# Patient Record
Sex: MALE | Race: WHITE | Employment: FULL TIME | URBAN - METROPOLITAN AREA
[De-identification: names, ages, dates, MRNs, and addresses within clinical notes are randomized per-mention and may not be internally consistent; named-entity substitution may affect disease eponyms.]

---

## 2017-03-06 ENCOUNTER — HOSPITAL ENCOUNTER (EMERGENCY)
Age: 27
Discharge: LEFT BEFORE DISCHARGE | End: 2017-03-06
Attending: EMERGENCY MEDICINE
Payer: SELF-PAY

## 2017-03-06 VITALS
SYSTOLIC BLOOD PRESSURE: 119 MMHG | BODY MASS INDEX: 22.35 KG/M2 | TEMPERATURE: 102.5 F | HEART RATE: 121 BPM | RESPIRATION RATE: 20 BRPM | HEIGHT: 72 IN | OXYGEN SATURATION: 98 % | WEIGHT: 165 LBS | DIASTOLIC BLOOD PRESSURE: 67 MMHG

## 2017-03-06 DIAGNOSIS — Z53.21 PATIENT LEFT AFTER TRIAGE: Primary | ICD-10-CM

## 2017-03-06 LAB
FLUAV AG NPH QL IA: NEGATIVE
FLUBV AG NOSE QL IA: NEGATIVE

## 2017-03-06 PROCEDURE — 75810000275 HC EMERGENCY DEPT VISIT NO LEVEL OF CARE

## 2017-03-06 PROCEDURE — 87804 INFLUENZA ASSAY W/OPTIC: CPT | Performed by: EMERGENCY MEDICINE

## 2017-03-06 PROCEDURE — 87081 CULTURE SCREEN ONLY: CPT | Performed by: EMERGENCY MEDICINE
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
## 2017-03-08 LAB
B-HEM STREP THROAT QL CULT: NEGATIVE
BACTERIA SPEC CULT: NORMAL
SERVICE CMNT-IMP: NORMAL

## 2019-12-03 ENCOUNTER — HOSPITAL ENCOUNTER (OUTPATIENT)
Dept: LAB | Age: 29
Discharge: HOME | End: 2019-12-03

## 2019-12-03 ENCOUNTER — OFFICE VISIT (OUTPATIENT)
Dept: FAMILY MEDICINE CLINIC | Age: 29
End: 2019-12-03

## 2019-12-03 VITALS
HEIGHT: 72 IN | DIASTOLIC BLOOD PRESSURE: 69 MMHG | WEIGHT: 160.2 LBS | HEART RATE: 103 BPM | OXYGEN SATURATION: 98 % | RESPIRATION RATE: 12 BRPM | SYSTOLIC BLOOD PRESSURE: 122 MMHG | TEMPERATURE: 97.9 F | BODY MASS INDEX: 21.7 KG/M2

## 2019-12-03 DIAGNOSIS — Z76.89 ENCOUNTER TO ESTABLISH CARE: ICD-10-CM

## 2019-12-03 DIAGNOSIS — Z76.89 ENCOUNTER TO ESTABLISH CARE: Primary | ICD-10-CM

## 2019-12-03 DIAGNOSIS — F19.11 HISTORY OF INTRAVENOUS DRUG ABUSE (HCC): ICD-10-CM

## 2019-12-03 DIAGNOSIS — Z87.09 HISTORY OF ASTHMA: ICD-10-CM

## 2019-12-03 DIAGNOSIS — B18.2 HEP C W/O COMA, CHRONIC (HCC): ICD-10-CM

## 2019-12-03 DIAGNOSIS — Z72.0 TOBACCO ABUSE DISORDER: ICD-10-CM

## 2019-12-03 DIAGNOSIS — R12 HEARTBURN: ICD-10-CM

## 2019-12-03 LAB
ALBUMIN SERPL-MCNC: 4.1 G/DL (ref 3.4–5)
ALBUMIN/GLOB SERPL: 1.2 {RATIO} (ref 0.8–1.7)
ALP SERPL-CCNC: 75 U/L (ref 45–117)
ALT SERPL-CCNC: 763 U/L (ref 16–61)
ANION GAP SERPL CALC-SCNC: 3 MMOL/L (ref 3–18)
AST SERPL-CCNC: 258 U/L (ref 10–38)
BASOPHILS # BLD: 0 K/UL (ref 0–0.1)
BASOPHILS NFR BLD: 0 % (ref 0–2)
BILIRUB SERPL-MCNC: 0.6 MG/DL (ref 0.2–1)
BUN SERPL-MCNC: 13 MG/DL (ref 7–18)
BUN/CREAT SERPL: 16 (ref 12–20)
CALCIUM SERPL-MCNC: 8.8 MG/DL (ref 8.5–10.1)
CHLORIDE SERPL-SCNC: 109 MMOL/L (ref 100–111)
CHOLEST SERPL-MCNC: 133 MG/DL
CO2 SERPL-SCNC: 28 MMOL/L (ref 21–32)
CREAT SERPL-MCNC: 0.82 MG/DL (ref 0.6–1.3)
DIFFERENTIAL METHOD BLD: ABNORMAL
EOSINOPHIL # BLD: 0.3 K/UL (ref 0–0.4)
EOSINOPHIL NFR BLD: 5 % (ref 0–5)
ERYTHROCYTE [DISTWIDTH] IN BLOOD BY AUTOMATED COUNT: 13.6 % (ref 11.6–14.5)
EST. AVERAGE GLUCOSE BLD GHB EST-MCNC: 97 MG/DL
ETHANOL SERPL-MCNC: <3 MG/DL (ref 0–3)
GLOBULIN SER CALC-MCNC: 3.5 G/DL (ref 2–4)
GLUCOSE SERPL-MCNC: 108 MG/DL (ref 74–99)
HBA1C MFR BLD: 5 % (ref 4.2–5.6)
HCT VFR BLD AUTO: 45.8 % (ref 36–48)
HDLC SERPL-MCNC: 38 MG/DL (ref 40–60)
HDLC SERPL: 3.5 {RATIO} (ref 0–5)
HGB BLD-MCNC: 15.9 G/DL (ref 13–16)
LDLC SERPL CALC-MCNC: 78 MG/DL (ref 0–100)
LIPID PROFILE,FLP: ABNORMAL
LITHIUM SERPL-SCNC: <0.2 MMOL/L (ref 0.6–1.2)
LYMPHOCYTES # BLD: 2 K/UL (ref 0.9–3.6)
LYMPHOCYTES NFR BLD: 32 % (ref 21–52)
MCH RBC QN AUTO: 32.5 PG (ref 24–34)
MCHC RBC AUTO-ENTMCNC: 34.7 G/DL (ref 31–37)
MCV RBC AUTO: 93.7 FL (ref 74–97)
MONOCYTES # BLD: 0.7 K/UL (ref 0.05–1.2)
MONOCYTES NFR BLD: 11 % (ref 3–10)
NEUTS SEG # BLD: 3.3 K/UL (ref 1.8–8)
NEUTS SEG NFR BLD: 52 % (ref 40–73)
PLATELET # BLD AUTO: 219 K/UL (ref 135–420)
PMV BLD AUTO: 10 FL (ref 9.2–11.8)
POTASSIUM SERPL-SCNC: 4.3 MMOL/L (ref 3.5–5.5)
PROT SERPL-MCNC: 7.6 G/DL (ref 6.4–8.2)
RBC # BLD AUTO: 4.89 M/UL (ref 4.7–5.5)
SODIUM SERPL-SCNC: 140 MMOL/L (ref 136–145)
TRIGL SERPL-MCNC: 85 MG/DL (ref ?–150)
TSH SERPL DL<=0.05 MIU/L-ACNC: 0.35 UIU/ML (ref 0.36–3.74)
VLDLC SERPL CALC-MCNC: 17 MG/DL
WBC # BLD AUTO: 6.3 K/UL (ref 4.6–13.2)

## 2019-12-03 PROCEDURE — 84443 ASSAY THYROID STIM HORMONE: CPT

## 2019-12-03 PROCEDURE — 80178 ASSAY OF LITHIUM: CPT

## 2019-12-03 PROCEDURE — 82542 COL CHROMOTOGRAPHY QUAL/QUAN: CPT

## 2019-12-03 PROCEDURE — 80061 LIPID PANEL: CPT

## 2019-12-03 PROCEDURE — 87491 CHLMYD TRACH DNA AMP PROBE: CPT

## 2019-12-03 PROCEDURE — 85025 COMPLETE CBC W/AUTO DIFF WBC: CPT

## 2019-12-03 PROCEDURE — 87389 HIV-1 AG W/HIV-1&-2 AB AG IA: CPT

## 2019-12-03 PROCEDURE — 87661 TRICHOMONAS VAGINALIS AMPLIF: CPT

## 2019-12-03 PROCEDURE — 86780 TREPONEMA PALLIDUM: CPT

## 2019-12-03 PROCEDURE — 87902 NFCT AGT GNTYP ALYS HEP C: CPT

## 2019-12-03 PROCEDURE — 83036 HEMOGLOBIN GLYCOSYLATED A1C: CPT

## 2019-12-03 PROCEDURE — 80053 COMPREHEN METABOLIC PANEL: CPT

## 2019-12-03 PROCEDURE — 87522 HEPATITIS C REVRS TRNSCRPJ: CPT

## 2019-12-03 PROCEDURE — 80307 DRUG TEST PRSMV CHEM ANLYZR: CPT

## 2019-12-03 PROCEDURE — 80074 ACUTE HEPATITIS PANEL: CPT

## 2019-12-03 RX ORDER — IBUPROFEN 200 MG
TABLET ORAL AS NEEDED
COMMUNITY
End: 2020-04-01 | Stop reason: ALTCHOICE

## 2019-12-03 RX ORDER — CALCIUM CARBONATE 200(500)MG
1 TABLET,CHEWABLE ORAL
COMMUNITY
End: 2020-04-01 | Stop reason: ALTCHOICE

## 2019-12-03 RX ORDER — DEXLANSOPRAZOLE 30 MG/1
30 CAPSULE, DELAYED RELEASE ORAL
Qty: 90 CAP | Refills: 0 | Status: SHIPPED | COMMUNITY
Start: 2019-12-03 | End: 2020-04-01 | Stop reason: ALTCHOICE

## 2019-12-03 RX ORDER — DEXLANSOPRAZOLE 30 MG/1
30 CAPSULE, DELAYED RELEASE ORAL
Qty: 90 CAP | Refills: 3 | Status: SHIPPED | COMMUNITY
Start: 2019-12-03 | End: 2019-12-17 | Stop reason: SDUPTHER

## 2019-12-03 RX ORDER — ALBUTEROL SULFATE 90 UG/1
2 AEROSOL, METERED RESPIRATORY (INHALATION)
Qty: 1 INHALER | Refills: 0 | Status: SHIPPED | COMMUNITY
Start: 2019-12-03 | End: 2022-07-21

## 2019-12-03 NOTE — PROGRESS NOTES
Chief Complaint   Patient presents with    Establish Care    Hepatitis C    Form Completion      Medical Report needs for DMV in order to get license due to having been on mood stabilizer med    Bipolar     history with lithium treatment    Addiction problem     hx of heroin abuse     1. When and where did you last receive medical care? Yes When: November 5, 2016 for Detox for Heroin at [facility] and tooth abscess out of state    2. When and where did you last have preventive care such as colon screening? Never    3. What is your current living situation (for example, live alone, live in home with immediate family members)? Lives with maternal grandparents    3. Do you have any problems with communication such trouble seeing, hearing, or understanding instructions? No    5. Do you have an advance directive? This is a document that you can give to family members with instructions for how you would want them to make health care decisions for you if you were unable to speak for yourself. (For example, unconscious, delerious)No    PMH/FH/Social Hx reviewed and updated as needed     Applicable screenings reviewed and updated as needed  Medication reconciliation performed. Patient does not need medication refills. Health Maintenance reviewed.

## 2019-12-03 NOTE — LETTER
12/3/2019 2:04 PM 
 
Mr. Yee [address] [id number] To whom it may concern, Malina Savage. is a patient of the [facility] and they are in need of the Hepatitis A/B immunizations to continue their care. As a patient at our office they have been screened and have limited income. Please consider a reduction in cost for these vaccinations. Without these vaccinations they can not obtain the necessary treatment that will improve their quality of life. If you have any questions please contact me at your convenience.    
 
 
 
 
 
 
Sincerely, 
 
 
Keren Hernandez NP

## 2019-12-04 ENCOUNTER — OFFICE VISIT (OUTPATIENT)
Dept: FAMILY MEDICINE CLINIC | Age: 29
End: 2019-12-04

## 2019-12-04 DIAGNOSIS — F51.11 PRIMARY HYPERSOMNIA: Primary | ICD-10-CM

## 2019-12-04 LAB
C TRACH RRNA SPEC QL NAA+PROBE: NEGATIVE
HAV IGM SER QL: NEGATIVE
HBV CORE IGM SER QL: NEGATIVE
HBV SURFACE AG SER QL: <0.1 INDEX
HBV SURFACE AG SER QL: NEGATIVE
HCV AB SER IA-ACNC: >11 INDEX
HCV AB SERPL QL IA: POSITIVE
HCV COMMENT,HCGAC: ABNORMAL
HIV 1+2 AB+HIV1 P24 AG SERPL QL IA: NONREACTIVE
HIV12 RESULT COMMENT, HHIVC: NORMAL
N GONORRHOEA RRNA SPEC QL NAA+PROBE: NEGATIVE
SP1: ABNORMAL
SP2: ABNORMAL
SP3: ABNORMAL
SPECIMEN SOURCE: NORMAL
T PALLIDUM AB SER QL IA: NONREACTIVE

## 2019-12-04 RX ORDER — TRAZODONE HYDROCHLORIDE 50 MG/1
50 TABLET ORAL
Qty: 30 TAB | Refills: 3 | Status: ON HOLD | OUTPATIENT
Start: 2019-12-04 | End: 2022-07-13
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
## 2019-12-06 LAB
SPECIMEN SOURCE: NORMAL
T VAGINALIS RRNA VAG QL NAA+PROBE: NEGATIVE

## 2019-12-07 LAB
HCV GENOTYPE: NORMAL
HCV GENTYP SERPL NAA+PROBE: NORMAL
HCV RNA SERPL NAA+PROBE-ACNC: NORMAL IU/ML
HCV RNA SERPL NAA+PROBE-LOG IU: 6.31 LOG10 IU/ML
PLEASE NOTE, 550474: NORMAL
TEST INFORMATION, 550045: NORMAL

## 2019-12-08 LAB
AMPHET UR QL SCN: NEGATIVE
BARBITURATES UR QL SCN: NEGATIVE
BENZODIAZ UR QL: NEGATIVE
CANNABINOIDS UR QL CFM: POSITIVE
CANNABINOIDS UR QL SCN: POSITIVE
COCAINE UR QL SCN: NEGATIVE
HDSCOM,HDSCOM: ABNORMAL
METHADONE UR QL: NEGATIVE
OPIATES UR QL: NEGATIVE
PCP UR QL: NEGATIVE
THC UR CFM-MCNC: >400 NG/ML

## 2019-12-16 ENCOUNTER — OFFICE VISIT (OUTPATIENT)
Dept: FAMILY MEDICINE CLINIC | Age: 29
End: 2019-12-16

## 2019-12-16 DIAGNOSIS — F51.01 PRIMARY INSOMNIA: Primary | ICD-10-CM

## 2019-12-16 NOTE — PROGRESS NOTES
This pleasant, cooperative patient is here today for a F/U visit. Patient is being seen for   To get paperwork completed from [facility] completed. This is to assist him with having his  driving privileges restored. Patient presents with pleasant affect and mood. He also  presents well developed and well nourished. Reports his appetite  and sleep is are good. Denies depression and anxiety at this time. Patient is open and candid regarding where  He is mentally and socially at this time. Patient denies SI/HI, hallucinations both visual  and/or auditory. Patient will return to office in 2 months.

## 2019-12-17 ENCOUNTER — OFFICE VISIT (OUTPATIENT)
Dept: FAMILY MEDICINE CLINIC | Age: 29
End: 2019-12-17

## 2019-12-17 VITALS
DIASTOLIC BLOOD PRESSURE: 76 MMHG | HEIGHT: 71 IN | HEART RATE: 96 BPM | SYSTOLIC BLOOD PRESSURE: 114 MMHG | OXYGEN SATURATION: 99 % | BODY MASS INDEX: 21.78 KG/M2 | RESPIRATION RATE: 20 BRPM | TEMPERATURE: 97.7 F | WEIGHT: 155.6 LBS

## 2019-12-17 DIAGNOSIS — B18.2 HEP C W/O COMA, CHRONIC (HCC): Primary | ICD-10-CM

## 2019-12-17 DIAGNOSIS — Z72.0 TOBACCO ABUSE DISORDER: ICD-10-CM

## 2019-12-17 NOTE — LETTER
12/17/2019 [facility] [address], [address] 1990, is picking up the following medications ordered from the [facility] Program: STOCK:  VENTOLIN HFA #1 Suzy Akhtar Patient's Signature: _____________________________ Today's Date: 12/17/2019

## 2019-12-17 NOTE — PATIENT INSTRUCTIONS
Hepatitis C: Care Instructions  Your Care Instructions    Hepatitis C is an infection of the liver caused by a virus. This virus spreads when blood or body fluids from an infected person enter another person's body. This occurs most often when people share needles that have the virus on them. In the past, people got the virus through blood transfusions and organ transplants. But since 1992, all donated blood and organs have been screened for hepatitis C. So getting the virus this way is now very rare. Less often, hepatitis C can spread through sex and sharing items such as razor blades or toothbrushes. Needles used for tattoos and body piercings can also spread the virus. The virus doesn't always cause symptoms. But you may feel tired. And you may have a headache, sore muscles, nausea, and pain in the upper right belly. Other symptoms include yellowish skin and dark urine. Home treatment can help ease symptoms. And your doctor may prescribe antiviral medicine. Long-term infection can lead to severe liver damage. So make sure to go to your follow-up appointments. Follow-up care is a key part of your treatment and safety. Be sure to make and go to all appointments, and call your doctor if you are having problems. It's also a good idea to know your test results and keep a list of the medicines you take. How can you care for yourself at home? · Be safe with medicines. If your doctor prescribes antiviral medicine, take it exactly as prescribed. Call your doctor if you think you are having a problem with your medicine. · Do not drink alcohol. Alcohol can damage the liver. Tell your doctor if you need help to quit. Counseling, support groups, and sometimes medicines can help you stay sober. · Do not take drugs or herbal medicines. They can make liver problems worse. · Make sure your doctor knows all of the medicines you take. Some medicines, such as acetaminophen (Tylenol), can make liver problems worse.  Do not take any new medicines unless your doctor tells you to. This includes over-the-counter medicines. · Maintain a healthy lifestyle. Get plenty of exercise if you feel up to it. Eat a healthy diet. · Drink plenty of fluids, enough so that your urine is light yellow or clear like water. If you have kidney, heart, or liver disease and have to limit fluids, talk with your doctor before you increase the amount of fluids you drink. · Get the vaccines (if you have not already) to protect yourself from hepatitis A and hepatitis B, influenza, and pneumococcus. · The infection can make you itch. Keep cool and stay out of the sun. Try to wear cotton clothing. Talk to your doctor about using over-the-counter medicines for itching. These include diphenhydramine (Benadryl) and chlorpheniramine (Chlor-Trimeton). Follow the instructions on the label. · If you feel depressed, talk to your doctor about treatment. Many people who have long-term illnesses get depressed. Keep in mind that antiviral medicine can make depression worse. To avoid spreading hepatitis C to others  · Tell the people that you live with or have sex with about your illness as soon as you can. · Don't share needles to inject drugs. Don't share other equipment (such as cotton, spoons, and water) with others. Find out if a needle exchange program is available in your area, and use it. Get into a drug treatment program.  · Practice safer sex. Reduce your number of sex partners if you have more than one. Unless you are in a long-term relationship in which neither partner has sex with anyone else, always use latex condoms when you have sex. · Don't donate blood or blood products, organs, semen, or eggs (ova). · Make sure that all equipment is sterilized if you get a tattoo, have your body pierced, or have acupuncture. · Do not share your personal items. These include razors, toothbrushes, towels, and nail files.   · Tell your doctor, dentist, and anyone else who may come in contact with your blood about your illness. · Prevent others from coming in contact with your blood and other body fluids. Keep any cuts, scrapes, or blisters covered. · Wash your hands--and any object that has come in contact with your blood--thoroughly with water and soap. When should you call for help? Call 911 anytime you think you may need emergency care. For example, call if:    · You have trouble breathing.     · You vomit blood or what looks like coffee grounds.    Call your doctor now or seek immediate medical care if:    · You feel very sleepy or confused.     · You have a fever.     · There is a new or increasing yellow tint to your skin or the whites of your eyes.     · You have new or worse belly pain.     · You have any abnormal bleeding, such as:  ? Nosebleeds. ? Vaginal bleeding that is different (heavier, more frequent, at a different time of the month) than what you are used to.  ? Bloody or black stools, or rectal bleeding. ? Bloody or pink urine.    Watch closely for changes in your health, and be sure to contact your doctor if:    · You have any problems.     · Your belly is getting bigger.     · You are gaining weight. Where can you learn more? Go to http://donna-ban.info/. Enter X783 in the search box to learn more about \"Hepatitis C: Care Instructions. \"  Current as of: June 9, 2019  Content Version: 12.2  © 1602-6595 [employer], [employer]. Care instructions adapted under license by [employer] (which disclaims liability or warranty for this information). If you have questions about a medical condition or this instruction, always ask your healthcare professional. [address] any warranty or liability for your use of this information.

## 2019-12-17 NOTE — PROGRESS NOTES
Subjective:     Julia Boothe is an 34 y.o. male who presents for follow up of chronic Hepatitis C secondary to h/o IV drug use. Since the last visit, Currently not on any treatment. Referred to [facility] for treatment. No updated Vaccinations. .    Patient's current symptoms include None. Patient denies abdominal pain, anorexia, dark urine, diarrhea, fatigue, fever, jaundice, pruritis and weight loss. Objective:        Visit Vitals  /76   Pulse 96   Temp 97.7 °F (36.5 °C)   Resp 20   Ht 5' 11\" (1.803 m)   Wt 155 lb 9.6 oz (70.6 kg)   SpO2 99%   BMI 21.70 kg/m²     Abdominal exam: soft, nontender, nondistended, no masses or organomegaly. Laboratory  Lab Results   Component Value Date/Time    ALT (SGPT) 763 (H) 12/03/2019 01:30 PM    AST (SGOT) 258 (H) 12/03/2019 01:30 PM    Alk. phosphatase 75 12/03/2019 01:30 PM    Bilirubin, total 0.6 12/03/2019 01:30 PM     No results found for: [id number], XHEPCS       Assessment/Plan:     Hepatitis C, genotype 1 A     ICD-10-CM ICD-9-CM    1. Hep C w/o coma, chronic (HCC) B18.2 070.54     Refer to GI. Hep Vaccination Letter given for patient to get Hep A/B Vaccinations. 2. Tobacco abuse disorder Z72.0 305.1      Diagnoses and all orders for this visit:    1. Hep C w/o coma, chronic (HCC)  Comments:  Refer to GI. Hep Vaccination Letter given for patient to get Hep A/B Vaccinations. 2. Tobacco abuse disorder    I have discussed the diagnosis with the patient and the intended plan as seen in the above orders. The patient has received an after-visit summary and questions were answered concerning future plans. I have discussed medication side effects and warnings with the patient as well. Patient agreeable with above plan and verbalizes understanding. Follow-up and Dispositions    · Return in about 3 months (around 3/17/2020), or if symptoms worsen or fail to improve.

## 2019-12-17 NOTE — LETTER
12/17/2019 1:21 PM 
 
Mr. Yee E [facility] [address] [address] [address] To whom it may concern, Prema Pereira. is a patient of the [facility] and they are in need of the Hepatitis A/B immunizations to continue their care. As a patient at our office they have been screened and have limited income. Please consider a reduction in cost for these vaccinations. Without these vaccinations they can not obtain the necessary treatment that will improve their quality of life. If you have any questions please contact me at your convenience.    
 
 
 
 
Sincerely, 
 
 
Pau Dominguez NP

## 2020-01-09 ENCOUNTER — TELEPHONE (OUTPATIENT)
Dept: FAMILY MEDICINE CLINIC | Age: 30
End: 2020-01-09

## 2020-01-09 NOTE — TELEPHONE ENCOUNTER
Medication: Dexilant 30mg  , dose: 1 tab, how often: every day as needed , current number of medication days provided: 90, refill per application. Lot #:    This medication was received and   Lot #[id number], EXP 01/2021  verified for the following 1. Correct Patient, 2. Correct Diagnosis, 3. Correct Drug, 4. Correct route, and no current allergy to medication.

## 2020-02-25 ENCOUNTER — OFFICE VISIT (OUTPATIENT)
Dept: FAMILY MEDICINE CLINIC | Age: 30
End: 2020-02-25

## 2020-02-25 VITALS
DIASTOLIC BLOOD PRESSURE: 68 MMHG | SYSTOLIC BLOOD PRESSURE: 105 MMHG | BODY MASS INDEX: 21.9 KG/M2 | OXYGEN SATURATION: 99 % | TEMPERATURE: 97.4 F | WEIGHT: 156.4 LBS | HEART RATE: 91 BPM | RESPIRATION RATE: 20 BRPM | HEIGHT: 71 IN

## 2020-02-25 DIAGNOSIS — B18.2 HEP C W/O COMA, CHRONIC (HCC): ICD-10-CM

## 2020-02-25 DIAGNOSIS — F17.200 CURRENT SMOKER: ICD-10-CM

## 2020-02-25 DIAGNOSIS — R00.2 INTERMITTENT PALPITATIONS: Primary | ICD-10-CM

## 2020-02-25 DIAGNOSIS — Z23 ENCOUNTER FOR IMMUNIZATION: ICD-10-CM

## 2020-02-25 DIAGNOSIS — I45.10 INCOMPLETE RIGHT BUNDLE BRANCH BLOCK: ICD-10-CM

## 2020-02-25 NOTE — PATIENT INSTRUCTIONS
!!PLEASE READ!! Former[facility] will be closing its doors approximately March 14, 2020. You can seek urgent medical care at any emergency department. For primary care purposes, please apply for the medicaid expansion at the financial office at [facility] [place]. If you don't qualify for medicaid, you can seek primary care at any clinic within [facility]. See below:  1) Awa SULTANA Chang- go onto website to see schedule. This is a mobile unit and goes from city to city. 2) [address] [facility], [place], [id number] [facility] Adithya [address]851, [address] [phone]  4) [facility] [address] [place], [facility] [phone]  5) [address] [phone]    TID BITS:  Disposing of medications in the household trash: Almost all medicines can be thrown into your household trash. These include prescription and over-the-counter (OTC) drugs in pills, liquids, drops, patches, creams, and inhalers. Follow these steps:  1) Remove the drugs from their original containers and mix them with something undesirable, such as used coffee grounds, dirt, or cat litter. This makes the medicine less appealing to children and pets and unrecognizable to someone who might intentionally go through the trash looking for drugs. 2) Put the mixture in something you can close (a re-sealable zipper storage bag, empty can, or other container) to prevent the drug from leaking or spilling out. 3) Throw the container in the garbage. 4) Scratch out all your personal information on the empty medicine packaging to protect your identity and privacy. Throw the packaging away.   If you have a question about your medicine, ask your health care provider or pharmacist.    If you obtain medical insurance and are interested in continuing your care with   Dr. Real Conley [facility], she will be transferring to:    [facility] [address] [place] [address], [facility] [address]  [facility], [facility] Emergency Department)  Phone: [phone]

## 2020-02-25 NOTE — PROGRESS NOTES
[facility]  [id number] [facility] [address] [facility] [address], [address]  [phone] [facility] fax      2/25/2020    Reason for visit:   Chief Complaint   Patient presents with    Follow Up Chronic Condition    Immunization/Injection       Patient: Ankita Fernandez, 1990, [id number]       Primary MD: No primary care provider on file. Subjective:   Ankita Fernandez, a 34 y.o. male, who presents for Follow Up Chronic Condition and Immunization/Injection      Past Medical History:   Diagnosis Date    ADHD (attention deficit hyperactivity disorder)     Asthma     Bipolar disorder ([facility] [address]     Facial injury     Hepatitis C     Heroin abuse ([facility]     treatment in [facility] 2016    Lyme disease        Past Surgical History:   Procedure Laterality Date    HX TYMPANOSTOMY      bilateral TM's as a child       Social History     Socioeconomic History    Marital status: SINGLE     Spouse name: Not on file    Number of children: 0    Years of education: 5    Highest education level: GED or equivalent   Occupational History    Occupation: Prep Cook    Social Needs    Financial resource strain: Not on file    Food insecurity:     Worry: Not on file     Inability: Not on file   [employer] needs:     Medical: Not on file     Non-medical: Not on file   Tobacco Use    Smoking status: Current Every Day Smoker     Packs/day: 1.00     Years: 10.00     Pack years: 10.00    Smokeless tobacco: Former User   Substance and Sexual Activity    Alcohol use: Yes     Alcohol/week: 3.0 standard drinks     Types: 3 Cans of beer per week     Frequency: 2-3 times a week     Drinks per session: 3 or 4     Binge frequency: Less than monthly     Comment: Occasional    Drug use: Yes     Types: Marijuana    Sexual activity: Not Currently     Partners: Female   Lifestyle    Physical activity:     Days per week: 2 days     Minutes per session: 20 min    Stress:  To some extent   Relationships    Social connections:     Talks on phone: Not on file     Gets together: Not on file     Attends [employer] service: Not on file     Active member of club or organization: Not on file     Attends meetings of clubs or organizations: Not on file     Relationship status: Not on file    Intimate partner violence:     Fear of current or ex partner: Not on file     Emotionally abused: Not on file     Physically abused: Not on file     Forced sexual activity: Not on file   Other Topics Concern     Service No    Blood Transfusions No    Caffeine Concern No    Occupational Exposure Yes     Comment: Dust in [employer] Hazards Yes     Comment: riding horses    Sleep Concern Yes     Comment: \"trouble sleeping\"    Stress Concern Yes    Weight Concern No    Special Diet No    Back Care No    Exercise Yes     Comment: runs    Bike Helmet No    Seat Belt Yes    Self-Exams Yes   Social History Narrative    Not on file       Allergies   Allergen Reactions    Tylenol [Acetaminophen] Other (comments)     \" Have liver problems and should not take\"         Current Outpatient Medications on File Prior to Visit   Medication Sig Dispense Refill    ibuprofen (MOTRIN) 200 mg tablet Take  by mouth as needed for Pain.  calcium carbonate (TUMS) 200 mg calcium (500 mg) chew Take 1 Tab by mouth daily as needed.  dexlansoprazole (DEXILANT) 30 mg capsule Take 1 Cap by mouth daily as needed (heartburn). Indications: heartburn 90 Cap 0    albuterol (PROVENTIL HFA, VENTOLIN HFA, PROAIR HFA) 90 mcg/actuation inhaler Take 2 Puffs by inhalation every six (6) hours as needed for Wheezing. 1 Inhaler 0    traZODone (DESYREL) 50 mg tablet Take 1 Tab by mouth nightly for 30 days. Indications: insomnia associated with depression 30 Tab 3     No current facility-administered medications on file prior to visit. Review of Systems   Constitutional: Negative. HENT: Negative. Eyes: Negative.     Respiratory: Negative for cough, shortness of breath and wheezing. Smoke cigarettes   Cardiovascular: Positive for palpitations. Used to take adderall but taken off due to palpitations. Palpitations went away for years, maybe happen occasionally. Started daily about 1 week. Notice palpitations when I am stressed. No SOB or chest pain. Gastrointestinal: Negative for heartburn. Never followed up with GI for my Hep C. No one called me. Take dexilant only as needed   Genitourinary: Negative. Musculoskeletal: Negative. Skin: Negative. Neurological: Negative. Endo/Heme/Allergies: Negative. Psychiatric/Behavioral: Negative for substance abuse. The patient does not have insomnia (taking trazadone only when needed). Objective:   Visit Vitals  /68   Pulse 91   Temp 97.4 °F (36.3 °C)   Resp 20   Ht 5' 11\" (1.803 m)   Wt 156 lb 6.4 oz (70.9 kg)   SpO2 99%   BMI 21.81 kg/m²      Wt Readings from Last 3 Encounters:   02/25/20 156 lb 6.4 oz (70.9 kg)   12/17/19 155 lb 9.6 oz (70.6 kg)   12/03/19 160 lb 3.2 oz (72.7 kg)     Lab Results   Component Value Date/Time    Glucose 108 (H) 12/03/2019 01:30 PM         Physical Exam  Constitutional:       Appearance: Normal appearance. HENT:      Head: Normocephalic and atraumatic. Right Ear: Tympanic membrane, ear canal and external ear normal.      Left Ear: Tympanic membrane, ear canal and external ear normal.      Nose: Nose normal.      Mouth/Throat:      Mouth: Mucous membranes are moist.   Neck:      Musculoskeletal: Normal range of motion and neck supple. Cardiovascular:      Rate and Rhythm: Normal rate and regular rhythm. Heart sounds: Normal heart sounds. Pulmonary:      Effort: Pulmonary effort is normal.      Breath sounds: Normal breath sounds. Musculoskeletal: Normal range of motion. Skin:     General: Skin is warm and dry. Neurological:      Mental Status: He is alert and oriented to person, place, and time. Psychiatric:         Mood and Affect: Mood normal.         Behavior: Behavior normal.         Thought Content: Thought content normal.       Assessment:    Melissa Prasad. who has risk factors including (see above previous medical hx) and:     1. Intermittent palpitations  Pt has a hx of anxiety and insomnia and pt admits he notices the palpitations more when he is stressed. Due to pts past drug hx, will obtain a baseline EKG. Pt is unable to go today but will complete EKG tomorrow. - EKG, 12 LEAD, INITIAL; Future    2. Hep C w/o coma, chronic (HCC)  Pt has a past hx of IVDU x3 years, previous to this he snorted heroin for 1-2 years. Pt decided in Nov 2016 that he didn't want to be hooked on drugs and went to inpatient rehab where he has been clean every since. Instructed pt ie Hep C and negative health effects. Pt was given the number to [facility] to call and schedule appt himself. Did send a message to [facility], to follow up. 3. Current smoker  Counseled patient on the dangers of tobacco use, and was advised to quit. Reviewed strategies to maximize success, including the use of Chantix. Discussed the risks of continued tobacco use such as elevated blood pressure, vascular irritation with increased incidence of CVD with stroke or MI and PVD causing claudication, lung damage that could lead to COPD, cancer and death. Encouraged an approach to find a few healthy habits, write them down then plan to decrease their cigarette use by one each week till they are gone all together and to plan for stress that may cause them to want to restart and how to prevent it by having new coping mechanisms in place. 1-800-QUIT-NOW provided for counseling       4. Encounter for immunization    - INFLUENZA VIRUS VAC QUAD,SPLIT,PRESV FREE SYRINGE IM      Written instructions followed our verbal discussion of all information discussed above, pending tests ordered and future goals/plans.  Patient expressed understanding of current diagnosis, planned testing, follow up and if needed to contact the office for any questions or concerns prior to the next visit. Plan:   Med reconciliation completed with patient. Reviewed side effects of medications with the patient. Questions were answered and patient verb understanding. Discussed lab results with patient    n/a      Orders Placed This Encounter    Influenza virus vaccine (QUADRIVALENT PRES FREE SYRINGE) IM ([id number])    EKG, 12 LEAD, INITIAL     Standing Status:   Future     Standing Expiration Date:   3/5/2020     Order Specific Question:   Reason for Exam:     Answer:   palpitations     Current Outpatient Medications   Medication Sig Dispense Refill    ibuprofen (MOTRIN) 200 mg tablet Take  by mouth as needed for Pain.  calcium carbonate (TUMS) 200 mg calcium (500 mg) chew Take 1 Tab by mouth daily as needed.  dexlansoprazole (DEXILANT) 30 mg capsule Take 1 Cap by mouth daily as needed (heartburn). Indications: heartburn 90 Cap 0    albuterol (PROVENTIL HFA, VENTOLIN HFA, PROAIR HFA) 90 mcg/actuation inhaler Take 2 Puffs by inhalation every six (6) hours as needed for Wheezing. 1 Inhaler 0    traZODone (DESYREL) 50 mg tablet Take 1 Tab by mouth nightly for 30 days. Indications: insomnia associated with depression 30 Tab 3       Follow-up and Dispositions         Pt notified of provider leaving the clinic as of 3/14/2020. Strongly encouraged pt to start seeking a new PCP. In pts dc paperwork, Pt was given some information to help seek out a new provider. Patient verbalized understanding. Encouraged pt to see [employer] for ITA application. Citlali Gonzalez. [employer], [address] [facility]      I spent 25 minutes with the patient in face-to-face consultation, of which greater than 50% was spent in counseling and coordination of care as described above.

## 2020-02-25 NOTE — Clinical Note
Please follow up with patient in reference to seeing GLST. Referral was closed due to no return call. Pt states he didn't get a call. [phone] is his cell number they can reach him at.  Thank you

## 2020-02-25 NOTE — PROGRESS NOTES
Bard Yu is a 34 y.o. male who presents for routine immunizations. He denies any symptoms , reactions or allergies that would exclude them from being immunized today. Risks and adverse reactions were discussed and the VIS was given to them. All questions were addressed. He was observed for 20 min post injection. There were no reactions observed.     Annemarie Villar

## 2020-02-26 ENCOUNTER — HOSPITAL ENCOUNTER (OUTPATIENT)
Dept: LAB | Age: 30
Discharge: HOME | End: 2020-02-26
Payer: SELF-PAY

## 2020-02-26 LAB
ATRIAL RATE: 87 BPM
CALCULATED P AXIS, ECG09: 72 DEGREES
CALCULATED R AXIS, ECG10: 76 DEGREES
CALCULATED T AXIS, ECG11: 47 DEGREES
DIAGNOSIS, 93000: NORMAL
P-R INTERVAL, ECG05: 128 MS
Q-T INTERVAL, ECG07: 356 MS
QRS DURATION, ECG06: 102 MS
QTC CALCULATION (BEZET), ECG08: 428 MS
VENTRICULAR RATE, ECG03: 87 BPM

## 2020-02-26 PROCEDURE — 93005 ELECTROCARDIOGRAM TRACING: CPT

## 2020-02-26 NOTE — PROGRESS NOTES
Re-faxed the closed referral to [facility] requesting appt to be scheduled. Called patient and he informs that he called [facility] yesterday and has been scheduled for appointment on 03/19/2020 at their [facility] office.

## 2020-03-02 NOTE — PROGRESS NOTES
Called patient and informed him of referral to Cardiology at [facility]. (Patient lives in [place]). Reminded patient to see [employer] for benefit screening or [employer]. Referral, note, and EKG e-faxed to Cardiology pending review and scheduling. Patient given Cardio contact number to call in case he does not receive a scheduling call. Patient made aware to bring photo ID, medications, and any payment requested. Patient voiced understanding.

## 2020-04-01 ENCOUNTER — OFFICE VISIT (OUTPATIENT)
Dept: CARDIOLOGY CLINIC | Age: 30
End: 2020-04-01

## 2020-04-01 VITALS
OXYGEN SATURATION: 98 % | BODY MASS INDEX: 21.84 KG/M2 | WEIGHT: 156 LBS | DIASTOLIC BLOOD PRESSURE: 64 MMHG | HEIGHT: 71 IN | HEART RATE: 65 BPM | SYSTOLIC BLOOD PRESSURE: 104 MMHG

## 2020-04-01 DIAGNOSIS — I45.10 RBBB: Primary | ICD-10-CM

## 2020-04-01 DIAGNOSIS — F17.200 TOBACCO USE DISORDER: ICD-10-CM

## 2020-04-01 DIAGNOSIS — R00.2 PALPITATIONS: ICD-10-CM

## 2020-04-01 NOTE — PROGRESS NOTES
Wallace Justen. presents today for No chief complaint on file. Wallaceisidro Albrechtanna. preferred language for health care discussion is english/other. Is someone accompanying this pt? no    Is the patient using any DME equipment during [address]? no    Depression Screening:  3 most recent PHQ Screens 4/1/2020   Little interest or pleasure in doing things Not at all   Feeling down, depressed, irritable, or hopeless Not at all   Total Score PHQ 2 0       Learning Assessment:  Learning Assessment 4/1/2020   PRIMARY LEARNER Patient   PRIMARY LANGUAGE ENGLISH   LEARNER PREFERENCE PRIMARY DEMONSTRATION   ANSWERED BY Patient   RELATIONSHIP SELF       Abuse Screening:  Abuse Screening Questionnaire 4/1/2020   Do you ever feel afraid of your partner? N   Are you in a relationship with someone who physically or mentally threatens you? N   Is it safe for you to go home? Y       Fall Risk  Fall Risk Assessment, last 12 mths 4/1/2020   Able to walk? Yes   Fall in past 12 months? No       Pt currently taking Anticoagulant therapy? no    Coordination of Care:  1. Have you been to the ER, urgent care clinic since your last visit? Hospitalized since your last visit? no    2. Have you seen or consulted any other health care providers outside of the [address] [place] [address] since your last visit? Include any pap smears or colon screening.  no

## 2020-04-01 NOTE — PATIENT INSTRUCTIONS
24 holter  Follow up as needed    If you have not heard from the central scheduler to schedule your testing in 48 hours, please call [phone].

## 2020-04-01 NOTE — PROGRESS NOTES
HISTORY OF PRESENT ILLNESS  Lidia Soria is a 34 y.o. male. HPI    Patient presents for a new office visit. He was referred here by his PCP for evaluation of heart palpitations and incomplete right bundle branch block on his EKG. Patient does not have a prior cardiac history. He does have a history of prior heroin abuse, but has been clean for the past 3 to 4 years. He currently smokes cigarettes approximately three quarters a pack a day and has for the past 10 years. More recently, he has been feeling heart palpitations primarily at night when he is trying to rest or relax. The symptoms will last intermittently for 15 to 20 minutes before resolving but then will occur later again for the same time. He describes a sensation of his heart skipping beats or fluttering or beating irregularly. He states it does not really race. There is no associated dizziness diaphoresis or syncope. He does notice occasional shortness of breath with the symptoms. He has never experienced any exertional chest pain or shortness of breath. Past Medical History:   Diagnosis Date    ADHD (attention deficit hyperactivity disorder)     Asthma     [facility]     Facial injury     Hepatitis C     [facility]in abuse ([facility]     treatment in [facility]ox 2016    Lyme disease      Current Outpatient Medications   Medication Sig Dispense Refill    albuterol (PROVENTIL HFA, VENTOLIN HFA, PROAIR HFA) 90 mcg/actuation inhaler Take 2 Puffs by inhalation every six (6) hours as needed for Wheezing. 1 Inhaler 0    traZODone (DESYREL) 50 mg tablet Take 1 Tab by mouth nightly for 30 days.  Indications: insomnia associated with depression 30 Tab 3     Allergies   Allergen Reactions    Tylenol [Acetaminophen] Other (comments)     \" Have liver problems and should not take\"        Social History     Tobacco Use    Smoking status: Current Every Day Smoker     Packs/day: 1.00     Years: 10.00     Pack years: 10.00    Smokeless tobacco: Former User   Substance Use Topics    Alcohol use: Yes     Alcohol/week: 3.0 standard drinks     Types: 3 Cans of beer per week     Frequency: 2-3 times a week     Drinks per session: 3 or 4     Binge frequency: Less than monthly     Comment: Occasional    Drug use: Yes     Types: Marijuana     Family History   Problem Relation Age of Onset    Emphysema Mother     COPD Mother     No Known Problems Father     Attention Deficit Hyperactivity Disorder Brother     Diabetes Maternal Grandmother     Stroke Maternal Grandmother     Kidney Disease Maternal Grandfather     Liver Disease Paternal Grandfather         Cirrhosis caused death         Review of Systems   Constitutional: Negative for chills, fever and weight loss. HENT: Negative for nosebleeds. Eyes: Negative for blurred vision and double vision. Respiratory: Negative for cough, shortness of breath and wheezing. Cardiovascular: Positive for palpitations. Negative for chest pain, orthopnea, claudication, leg swelling and PND. Gastrointestinal: Negative for abdominal pain, heartburn, nausea and vomiting. Genitourinary: Negative for dysuria and hematuria. Musculoskeletal: Negative for falls and myalgias. Skin: Negative for rash. Neurological: Negative for dizziness, focal weakness and headaches. Endo/Heme/Allergies: Does not bruise/bleed easily. Psychiatric/Behavioral: Negative for substance abuse. Visit Vitals  /64   Pulse 65   Ht 5' 11\" (1.803 m)   Wt 70.8 kg (156 lb)   SpO2 98%   BMI 21.76 kg/m²       Physical Exam   Constitutional: He is oriented to person, place, and time. He appears well-developed and well-nourished. HENT:   Head: Normocephalic and atraumatic. Eyes: Conjunctivae are normal.   Neck: Neck supple. No JVD present. Carotid bruit is not present. Cardiovascular: Normal rate, regular rhythm, S1 normal, S2 normal and normal pulses. Exam reveals no gallop and no S3.    No murmur heard.  Pulmonary/Chest: Breath sounds normal. He has no wheezes. He has no rales. Abdominal: Soft. Bowel sounds are normal. There is no abdominal tenderness. Musculoskeletal:         General: No tenderness, deformity or edema. Neurological: He is alert and oriented to person, place, and time. Skin: Skin is warm and dry. Psychiatric: He has a normal mood and affect. His behavior is normal. Thought content normal.     EKG: Normal sinus rhythm, normal axis, normal QTc interval, no ST or T wave abnormalities concerning for ischemia. Normal tracing. Compared to the previous EKG from last month, incomplete right bundle branch block is no longer present. ASSESSMENT and PLAN    Heart palpitations. My suspicion is that the patient may be having intermittent PVCs or possibly PACs. Since the symptoms are occurring on a nightly basis, I have recommended a  24-hour Holter monitor for further evaluation. If this is unremarkable, no further work-up is necessary. Incomplete right bundle branch block. Appears to be intermittent on EKGs. This is a normal variant and benign finding, no further work-up necessary for this. Tobacco use disorder. Patient has been smoking three quarters a pack to a pack of cigarettes a day for the past 10 years. He was counseled on the importance of smoking cessation. He is willing to consider this. As long as the patient's echocardiogram does not show any significant arrhythmias, he can follow-up in the future as needed.

## 2022-01-04 ENCOUNTER — APPOINTMENT (OUTPATIENT)
Age: 32
End: 2022-01-04

## 2022-01-04 PROBLEM — Z00.00 ENCOUNTER FOR PREVENTIVE HEALTH EXAMINATION: Status: ACTIVE | Noted: 2022-01-04

## 2022-01-07 ENCOUNTER — APPOINTMENT (OUTPATIENT)
Age: 32
End: 2022-01-07

## 2022-03-19 PROBLEM — F17.200 TOBACCO USE DISORDER: Status: ACTIVE | Noted: 2020-04-01

## 2022-03-20 PROBLEM — R00.2 PALPITATIONS: Status: ACTIVE | Noted: 2020-04-01

## 2022-06-22 ENCOUNTER — APPOINTMENT (OUTPATIENT)
Age: 32
End: 2022-06-22
Attending: STUDENT IN AN ORGANIZED HEALTH CARE EDUCATION/TRAINING PROGRAM

## 2022-06-22 ENCOUNTER — HOSPITAL ENCOUNTER (EMERGENCY)
Age: 32
Discharge: HOME | End: 2022-06-22
Attending: STUDENT IN AN ORGANIZED HEALTH CARE EDUCATION/TRAINING PROGRAM

## 2022-06-22 VITALS
RESPIRATION RATE: 16 BRPM | WEIGHT: 155 LBS | DIASTOLIC BLOOD PRESSURE: 83 MMHG | HEIGHT: 71 IN | OXYGEN SATURATION: 100 % | HEART RATE: 93 BPM | TEMPERATURE: 98.4 F | BODY MASS INDEX: 21.7 KG/M2 | SYSTOLIC BLOOD PRESSURE: 113 MMHG

## 2022-06-22 DIAGNOSIS — M54.31 SCIATICA OF RIGHT SIDE: Primary | ICD-10-CM

## 2022-06-22 LAB
AMORPH CRY URNS QL MICRO: ABNORMAL
AMPHET UR QL SCN: POSITIVE
ANION GAP SERPL CALC-SCNC: 5 MMOL/L (ref 3–18)
APPEARANCE UR: ABNORMAL
BACTERIA URNS QL MICRO: NEGATIVE /HPF
BARBITURATES UR QL SCN: NEGATIVE
BASOPHILS # BLD: 0 K/UL (ref 0–0.1)
BASOPHILS NFR BLD: 0 % (ref 0–2)
BENZODIAZ UR QL: NEGATIVE
BILIRUB UR QL: NEGATIVE
BUN SERPL-MCNC: 10 MG/DL (ref 7–18)
BUN/CREAT SERPL: 14 (ref 12–20)
CALCIUM SERPL-MCNC: 9 MG/DL (ref 8.5–10.1)
CANNABINOIDS UR QL SCN: POSITIVE
CHLORIDE SERPL-SCNC: 107 MMOL/L (ref 100–111)
CO2 SERPL-SCNC: 27 MMOL/L (ref 21–32)
COCAINE UR QL SCN: NEGATIVE
COLOR UR: YELLOW
CREAT SERPL-MCNC: 0.7 MG/DL (ref 0.6–1.3)
CRP SERPL-MCNC: 0.4 MG/DL (ref 0–0.3)
DIFFERENTIAL METHOD BLD: ABNORMAL
EOSINOPHIL # BLD: 0.3 K/UL (ref 0–0.4)
EOSINOPHIL NFR BLD: 3 % (ref 0–5)
EPITH CASTS URNS QL MICRO: ABNORMAL /LPF (ref 0–5)
ERYTHROCYTE [DISTWIDTH] IN BLOOD BY AUTOMATED COUNT: 12.9 % (ref 11.6–14.5)
ERYTHROCYTE [SEDIMENTATION RATE] IN BLOOD: 17 MM/HR (ref 0–15)
GLUCOSE SERPL-MCNC: 140 MG/DL (ref 74–99)
GLUCOSE UR STRIP.AUTO-MCNC: NEGATIVE MG/DL
HCT VFR BLD AUTO: 39 % (ref 36–48)
HDSCOM,HDSCOM: ABNORMAL
HGB BLD-MCNC: 13.7 G/DL (ref 13–16)
HGB UR QL STRIP: NEGATIVE
IMM GRANULOCYTES # BLD AUTO: 0.1 K/UL (ref 0–0.04)
IMM GRANULOCYTES NFR BLD AUTO: 1 % (ref 0–0.5)
KETONES UR QL STRIP.AUTO: NEGATIVE MG/DL
LEUKOCYTE ESTERASE UR QL STRIP.AUTO: ABNORMAL
LYMPHOCYTES # BLD: 2.9 K/UL (ref 0.9–3.6)
LYMPHOCYTES NFR BLD: 26 % (ref 21–52)
MCH RBC QN AUTO: 31.3 PG (ref 24–34)
MCHC RBC AUTO-ENTMCNC: 35.1 G/DL (ref 31–37)
MCV RBC AUTO: 89 FL (ref 78–100)
METHADONE UR QL: NEGATIVE
MONOCYTES # BLD: 1.2 K/UL (ref 0.05–1.2)
MONOCYTES NFR BLD: 11 % (ref 3–10)
MUCOUS THREADS URNS QL MICRO: ABNORMAL /LPF
NEUTS SEG # BLD: 6.8 K/UL (ref 1.8–8)
NEUTS SEG NFR BLD: 60 % (ref 40–73)
NITRITE UR QL STRIP.AUTO: NEGATIVE
NRBC # BLD: 0 K/UL (ref 0–0.01)
NRBC BLD-RTO: 0 PER 100 WBC
OPIATES UR QL: POSITIVE
PCP UR QL: NEGATIVE
PH UR STRIP: 7.5 [PH] (ref 5–8)
PLATELET # BLD AUTO: 193 K/UL (ref 135–420)
PMV BLD AUTO: 9.5 FL (ref 9.2–11.8)
POTASSIUM SERPL-SCNC: 3.2 MMOL/L (ref 3.5–5.5)
PROT UR STRIP-MCNC: NEGATIVE MG/DL
RBC # BLD AUTO: 4.38 M/UL (ref 4.35–5.65)
RBC #/AREA URNS HPF: NEGATIVE /HPF (ref 0–5)
SODIUM SERPL-SCNC: 139 MMOL/L (ref 136–145)
SP GR UR REFRACTOMETRY: 1.01 (ref 1–1.03)
UROBILINOGEN UR QL STRIP.AUTO: 1 EU/DL (ref 0.2–1)
WBC # BLD AUTO: 11.3 K/UL (ref 4.6–13.2)
WBC URNS QL MICRO: ABNORMAL /HPF (ref 0–4)

## 2022-06-22 PROCEDURE — 85652 RBC SED RATE AUTOMATED: CPT

## 2022-06-22 PROCEDURE — 74011250636 HC RX REV CODE- 250/636: Performed by: STUDENT IN AN ORGANIZED HEALTH CARE EDUCATION/TRAINING PROGRAM

## 2022-06-22 PROCEDURE — 96374 THER/PROPH/DIAG INJ IV PUSH: CPT

## 2022-06-22 PROCEDURE — 99284 EMERGENCY DEPT VISIT MOD MDM: CPT

## 2022-06-22 PROCEDURE — 74011250636 HC RX REV CODE- 250/636: Performed by: EMERGENCY MEDICINE

## 2022-06-22 PROCEDURE — 96375 TX/PRO/DX INJ NEW DRUG ADDON: CPT

## 2022-06-22 PROCEDURE — 86140 C-REACTIVE PROTEIN: CPT

## 2022-06-22 PROCEDURE — 81001 URINALYSIS AUTO W/SCOPE: CPT

## 2022-06-22 PROCEDURE — 74011636637 HC RX REV CODE- 636/637: Performed by: STUDENT IN AN ORGANIZED HEALTH CARE EDUCATION/TRAINING PROGRAM

## 2022-06-22 PROCEDURE — 80048 BASIC METABOLIC PNL TOTAL CA: CPT

## 2022-06-22 PROCEDURE — 72148 MRI LUMBAR SPINE W/O DYE: CPT

## 2022-06-22 PROCEDURE — 85025 COMPLETE CBC W/AUTO DIFF WBC: CPT

## 2022-06-22 PROCEDURE — 80307 DRUG TEST PRSMV CHEM ANLYZR: CPT

## 2022-06-22 RX ORDER — DIAZEPAM 10 MG/2ML
10 INJECTION INTRAMUSCULAR ONCE
Status: COMPLETED | OUTPATIENT
Start: 2022-06-22 | End: 2022-06-22

## 2022-06-22 RX ORDER — METHYLPREDNISOLONE 4 MG/1
TABLET ORAL
Qty: 1 DOSE PACK | Refills: 0 | Status: ON HOLD | OUTPATIENT
Start: 2022-06-22 | End: 2022-07-13

## 2022-06-22 RX ORDER — PREDNISONE 20 MG/1
60 TABLET ORAL
Status: COMPLETED | OUTPATIENT
Start: 2022-06-22 | End: 2022-06-22

## 2022-06-22 RX ORDER — HYDROCODONE BITARTRATE AND ACETAMINOPHEN 5; 325 MG/1; MG/1
TABLET ORAL
Status: ON HOLD | COMMUNITY
End: 2022-07-13

## 2022-06-22 RX ORDER — KETOROLAC TROMETHAMINE 15 MG/ML
15 INJECTION, SOLUTION INTRAMUSCULAR; INTRAVENOUS ONCE
Status: COMPLETED | OUTPATIENT
Start: 2022-06-22 | End: 2022-06-22

## 2022-06-22 RX ADMIN — KETOROLAC TROMETHAMINE 15 MG: 15 INJECTION, SOLUTION INTRAMUSCULAR; INTRAVENOUS at 06:46

## 2022-06-22 RX ADMIN — DIAZEPAM 10 MG: 5 INJECTION, SOLUTION INTRAMUSCULAR; INTRAVENOUS at 05:29

## 2022-06-22 RX ADMIN — PREDNISONE 60 MG: 20 TABLET ORAL at 05:29

## 2022-06-22 NOTE — ED NOTES
ED Course as of 06/22/22 0718   Wed Jun 22, 2022   0715 I saw and evaluated the patient, he localizes his pain to the right SI joint and going through his buttocks and down his leg. He is objectively neurologically intact by sensation as well as plantar flexion of the great toes. He has voided so no evidence of neurogenic bladder. Acknowledges last IV drug use was 2 months ago. Drug screen positive for amphetamines he denies abusing these or having prescription for this but does acknowledge smoking marijuana. So maybe this is the route that gave him the amphetamines. Discussed his impulsive behavior here in the ER, at random times yelling and then silent, some of the drugs may contribute to this behavior. Oriented him to administration of steroids and anti-inflammatories. After the MRI we will try topical as well. He is agreeable and pleasant during the conversation [CB]      ED Course User Index  [CB] Shameka Johnson MD     10:30 AM negative MRI, discussed with patient, Dr. Beatriz Sapp has sent steroid [facility] has sent naproxen Robaxin and lidocaine patch. Instructed the patient to pick these up. Referral to [facility], he is trying to get his Medicaid moved from [place].     Encounter Diagnoses     ICD-10-CM ICD-9-CM   1. Sciatica of right side  M54.31 724.3     Remains neurologically intact

## 2022-06-22 NOTE — ED PROVIDER NOTES
EMERGENCY DEPARTMENT HISTORY AND PHYSICAL EXAM    I have evaluated the patient at 5:20 AM      Date: 6/22/2022  Patient Name: Rafa Rodney. History of Presenting Illness     Chief Complaint   Patient presents with    LOW BACK PAIN         History Provided By: Patient  Location/Duration/Severity/Modifying factors   36year old male with history of substance abuse and psychiatric disorder presenting to the emergency department for evaluation of right sided lower back pain that started earlier tonight. Described sharp pain of lower right back with radiation across his right buttock down his posterior right thigh. Denies any injury or trauma. Denies fever/chills, fecal or urinary incontinence. States the last time he used heroin was over 3 months ago. He presented to an outside facility's emergency department tonight where he was treated with norco and discharged with Robaxin. PCP: Gerardo Dawson MD    Current Outpatient Medications   Medication Sig Dispense Refill    HYDROcodone-acetaminophen (Norco) 5-325 mg per tablet Take  by mouth.  traZODone (DESYREL) 50 mg tablet Take 1 Tab by mouth nightly for 30 days. Indications: insomnia associated with depression 30 Tab 3    albuterol (PROVENTIL HFA, VENTOLIN HFA, PROAIR HFA) 90 mcg/actuation inhaler Take 2 Puffs by inhalation every six (6) hours as needed for Wheezing.  1 Inhaler 0       Past History     Past Medical History:  Past Medical History:   Diagnosis Date    ADHD (attention deficit hyperactivity disorder)     Asthma     Bipolar disorder (HCC)     Facial injury     Hepatitis C     Heroin abuse ([facility] [address]     treatment in [facility] 2016    Lyme disease        Past Surgical History:  Past Surgical History:   Procedure Laterality Date    HX TYMPANOSTOMY      bilateral TM's as a child       Family History:  Family History   Problem Relation Age of Onset   [facility] Emphysema Mother    [facility] COPD Mother     No Known Problems Father     Attention Deficit Hyperactivity Disorder Brother     Diabetes Maternal Grandmother     Stroke Maternal Grandmother     Kidney Disease Maternal Grandfather     Liver Disease Paternal Grandfather         Cirrhosis caused death       Social History:  Social History     Tobacco Use    Smoking status: Current Every Day Smoker     Packs/day: 1.00     Years: 10.00     Pack years: 10.00    Smokeless tobacco: Former User   Substance Use Topics    Alcohol use: Yes     Alcohol/week: 3.0 standard drinks     Types: 3 Cans of beer per week     Comment: Occasional    Drug use: Yes     Types: Marijuana       Allergies: Allergies   Allergen Reactions    Tylenol [Acetaminophen] Other (comments)     \" Have liver problems and should not take\"           Review of Systems       Review of Systems   Constitutional: Negative for activity change, chills, diaphoresis, fatigue and fever. Respiratory: Negative for cough, chest tightness, shortness of breath, wheezing and stridor. Cardiovascular: Negative for chest pain and palpitations. Gastrointestinal: Negative for abdominal distention, abdominal pain, constipation, diarrhea, nausea and vomiting. Genitourinary: Negative for difficulty urinating, dysuria and hematuria. Musculoskeletal: Positive for back pain. Negative for joint swelling and myalgias. Skin: Negative for rash. Neurological: Negative for dizziness, weakness, light-headedness, numbness and headaches. Psychiatric/Behavioral: Negative for agitation. The patient is not nervous/anxious. Physical Exam     Visit Vitals  /67   Pulse 93   Temp 98.4 °F (36.9 °C)   Resp 16   Wt 70.3 kg (155 lb)   SpO2 100%   BMI 21.62 kg/m²         Physical Exam  Constitutional:       General: He is not in acute distress. Appearance: He is not toxic-appearing. Comments: Unkempt   HENT:      Head: Normocephalic and atraumatic.       Mouth/Throat:      Mouth: Mucous membranes are moist.   Eyes:      Extraocular Movements: Extraocular movements intact. Pupils: Pupils are equal, round, and reactive to light. Cardiovascular:      Rate and Rhythm: Normal rate and regular rhythm. Heart sounds: Normal heart sounds. No murmur heard. No friction rub. No gallop. Pulmonary:      Effort: Pulmonary effort is normal.      Breath sounds: Normal breath sounds. Abdominal:      General: There is no distension. Palpations: Abdomen is soft. There is no mass. Tenderness: There is no abdominal tenderness. There is no guarding. Hernia: No hernia is present. Musculoskeletal:         General: No swelling, tenderness or deformity. Cervical back: Normal range of motion and neck supple. Comments: Right paralumbar back tenderness to palpation. Positive straight leg raise   Skin:     General: Skin is warm and dry. Capillary Refill: Capillary refill takes less than 2 seconds. Findings: No rash. Comments: Track marks left antecubital area   Neurological:      General: No focal deficit present. Mental Status: He is alert and oriented to person, place, and time. Sensory: No sensory deficit. Motor: No weakness. Deep Tendon Reflexes: Reflexes normal.      Comments: 2+ patellar DTRs bilaterally, moving all extremities   Psychiatric:         Mood and Affect: Mood normal.           Diagnostic Study Results     Labs -  No results found for this or any previous visit (from the past 12 hour(s)). Radiologic Studies -   No orders to display         Medical Decision Making   I am the first provider for this patient. I reviewed the vital signs, available nursing notes, past medical history, past surgical history, family history and social history. Vital Signs-Reviewed the patient's vital signs.       Records Reviewed: Nursing Notes, Old Medical Records, Previous Radiology Studies and Previous Laboratory Studies (Time of Review: 5:20 AM)    ED Course: Progress Notes, Reevaluation, and Consults:         Provider Notes (Medical Decision Making):   MDM  Number of Diagnoses or Management Options  Sciatica of right side  Diagnosis management comments: Patient presenting with complaints of right lower back pain. Consistent with sciatic pain. No red flag symptoms of cauda equina syndrome. Refusingt to ambulated. Screaming that he is unable to move although he is spontaneously moving all 4 extremities and writhing in bed on exam. Normal DTRs. No saddle anesthesia. No midline spinal tenderness. Will obtain screening bloodwork and inflammatory markers. And MRI lumbar spine to rule out epidural abscess. . Will administer valium and prednisone. Patient case will be signed out to Dr. Adilene Betts to follow up bloodwork and imaging. His assistance is appreciated but it should be noted that I will be the provider of record. Diagnosis     Clinical Impression:   1. Sciatica of right side        Disposition: TBD    Follow-up Information    None          Patient's Medications   Start Taking    No medications on file   Continue Taking    ALBUTEROL (PROVENTIL HFA, VENTOLIN HFA, PROAIR HFA) 90 MCG/ACTUATION INHALER    Take 2 Puffs by inhalation every six (6) hours as needed for Wheezing. HYDROCODONE-ACETAMINOPHEN (NORCO) 5-325 MG PER TABLET    Take  by mouth. TRAZODONE (DESYREL) 50 MG TABLET    Take 1 Tab by mouth nightly for 30 days. Indications: insomnia associated with depression   These Medications have changed    No medications on file   Stop Taking    No medications on file     Disclaimer: Sections of this note are dictated using utilizing voice recognition software. Minor typographical errors may be present. If questions arise, please do not hesitate to contact me or call our department.

## 2022-06-22 NOTE — ED TRIAGE NOTES
Pt brought in by EMS. Pt was seen at [facility] yesterday for same symptoms. Pt states that he has pain to lower back pain that radiates down to Rt knee.  Pt yelling upon arrival to ED

## 2022-06-22 NOTE — ED NOTES
Patient screams that he needs help. Patient is being very loud. Asked patient to please try to keep his voice low. Patient says that he can't. Writer tells patient that physician will be in to see patient soon. As writer walks away, patient says that \"this hospital sucks\".

## 2022-06-22 NOTE — ED NOTES
Patient continues to scream out very loud. Patient is screams for help. Patient screams that we have made his pain worse.

## 2022-06-22 NOTE — ED NOTES
10:53 AM  06/22/22     Discharge instructions given to Daron Mazariegos (name) with verbalization of understanding. Patient accompanied by self. Patient discharged with the following prescriptions Medrol Devaughn. Patient discharged to home (destination).       Faby Low RN

## 2022-07-08 ENCOUNTER — HOSPITAL ENCOUNTER (EMERGENCY)
Age: 32
Discharge: HOME | End: 2022-07-08
Attending: EMERGENCY MEDICINE

## 2022-07-08 VITALS
HEART RATE: 110 BPM | SYSTOLIC BLOOD PRESSURE: 125 MMHG | DIASTOLIC BLOOD PRESSURE: 101 MMHG | TEMPERATURE: 98.9 F | OXYGEN SATURATION: 100 % | RESPIRATION RATE: 18 BRPM

## 2022-07-08 DIAGNOSIS — K59.00 CONSTIPATION, UNSPECIFIED CONSTIPATION TYPE: Primary | ICD-10-CM

## 2022-07-08 PROCEDURE — 74011250637 HC RX REV CODE- 250/637: Performed by: EMERGENCY MEDICINE

## 2022-07-08 PROCEDURE — 99283 EMERGENCY DEPT VISIT LOW MDM: CPT

## 2022-07-08 RX ORDER — MAGNESIUM CITRATE
296 SOLUTION, ORAL ORAL
Status: COMPLETED | OUTPATIENT
Start: 2022-07-08 | End: 2022-07-08

## 2022-07-08 RX ADMIN — SODIUM PHOSPHATE, DIBASIC AND SODIUM PHOSPHATE, MONOBASIC 1 ENEMA: 7; 19 ENEMA RECTAL at 03:28

## 2022-07-08 RX ADMIN — MAGNESIUM CITRATE 296 ML: 1.75 LIQUID ORAL at 03:28

## 2022-07-08 NOTE — ED TRIAGE NOTES
Patient c/o right side sciatica pain. Seen here last week for same. He also c/o constipation and denies taking anything for it.   Past Medical History:   Diagnosis Date    ADHD (attention deficit hyperactivity disorder)     Asthma     B[facility] disorder ([facility] [address]     Facial injury     Hepatitis C     Heroin abuse ([facility] [address]     treatment in [facility] 2016    Lyme disease

## 2022-07-08 NOTE — ED PROVIDER NOTES
HPI-year-old male who presents to ER with complaint been constipated 3 to 4 days. No other complaints. Past Medical History:   Diagnosis Date    ADHD (attention deficit hyperactivity disorder)     Asthma     B[facility] disorder ([facility] [address]     Facial injury     Hepatitis C     Heroin abuse ([facility] [address]     treatment in [facility] 2016    Lyme disease        Past Surgical History:   Procedure Laterality Date    HX TYMPANOSTOMY      bilateral TM's as a child         Family History:   Problem Relation Age of Onset   [facility]ef[facility] Emphysema Mother     COPD Mother     No Known Problems Father     Attention Deficit Hyperactivity Disorder Brother     Diabetes Maternal Grandmother     Stroke Maternal Grandmother     Kidney Disease Maternal Grandfather     Liver Disease Paternal Grandfather         Cirrhosis caused death       Social History     Socioeconomic History    Marital status: SINGLE     Spouse name: Not on file    Number of children: 0    Years of education: 5    Highest education level: GED or equivalent   Occupational History    Occupation: Prep Cook    Tobacco Use    Smoking status: Current Every Day Smoker     Packs/day: 1.00     Years: 10.00     Pack years: 10.00    Smokeless tobacco: Former User   Substance and Sexual Activity    Alcohol use:  Yes     Alcohol/week: 3.0 standard drinks     Types: 3 Cans of beer per week     Comment: Occasional    Drug use: Yes     Types: Marijuana    Sexual activity: Not Currently     Partners: Female   Other Topics Concern     Service No    Blood Transfusions No    Caffeine Concern No    Occupational Exposure Yes     Comment: Dust in [employer] Hazards Yes     Comment: riding horses    Sleep Concern Yes     Comment: \"trouble sleeping\"    Stress Concern Yes    Weight Concern No    Special Diet No    Back Care No    Exercise Yes     Comment: runs    Bike Helmet No    Seat Belt Yes    Self-Exams Yes   Social History Narrative    Not on file     Social Determinants of Health     Financial Resource Strain:     Difficulty of Paying Living Expenses: Not on file   Food Insecurity:     Worried About Running Out of Food in the Last Year: Not on file    Jenny of Food in the Last Year: Not on file   Transportation Needs:     Lack of Transportation (Medical): Not on file    Lack of Transportation (Non-Medical): Not on file   Physical Activity:     Days of Exercise per Week: Not on file    Minutes of Exercise per Session: Not on file   Stress:     Feeling of Stress : Not on file   Social Connections:     Frequency of Communication with Friends and Family: Not on file    Frequency of Social Gatherings with Friends and Family: Not on file    Attends Taoist Services: Not on file    Active Member of [address] [place] [address] [employer] or Organizations: Not on file    Attends Club or Organization Meetings: Not on file    Marital Status: Not on file   Intimate Partner Violence:     Fear of Current or Ex-Partner: Not on file    Emotionally Abused: Not on file    Physically Abused: Not on file    Sexually Abused: Not on file   Housing Stability:     Unable to Pay for Housing in the Last Year: Not on file    Number of [place] in the Last Year: Not on file    Unstable Housing in the Last Year: Not on file         ALLERGIES: Tylenol [acetaminophen]    Review of Systems   Constitutional: Negative. HENT: Negative. Eyes: Negative. Respiratory: Negative. Cardiovascular: Negative. Gastrointestinal: Positive for abdominal distention and constipation. Negative for vomiting. Endocrine: Negative. Genitourinary: Negative. Musculoskeletal: Negative. Skin: Negative. Allergic/Immunologic: Negative. Neurological: Negative. Hematological: Negative. Psychiatric/Behavioral: Negative. All other systems reviewed and are negative.       Vitals:    07/08/22 0021   BP: (!) 125/101   Pulse: (!) 110   Resp: 18   Temp: 98.9 °F (37.2 °C)   SpO2: 100%            Physical Exam  Vitals and nursing note reviewed. Constitutional:       General: He is not in acute distress. Appearance: He is well-developed. HENT:      Head: Normocephalic. Eyes:      Conjunctiva/sclera: Conjunctivae normal.      Pupils: Pupils are equal, round, and reactive to light. Cardiovascular:      Rate and Rhythm: Normal rate and regular rhythm. Heart sounds: Normal heart sounds. No murmur heard. Pulmonary:      Effort: Pulmonary effort is normal. No respiratory distress. Breath sounds: Normal breath sounds. No wheezing or rales. Chest:      Chest wall: No tenderness. Abdominal:      General: Bowel sounds are normal.      Palpations: Abdomen is soft. Tenderness: There is no abdominal tenderness. There is no guarding or rebound. Hernia: No hernia is present. Musculoskeletal:         General: No tenderness. Normal range of motion. Cervical back: Normal range of motion and neck supple. Skin:     General: Skin is warm and dry. Findings: No rash. Neurological:      Mental Status: He is alert and oriented to person, place, and time. Cranial Nerves: No cranial nerve deficit. Motor: No abnormal muscle tone. Coordination: Coordination normal.   Psychiatric:         Behavior: Behavior normal.         Thought Content: Thought content normal.         Judgment: Judgment normal.          MDM       Procedures    Dx: constipation    Disp: D/C home. Fleet enema x 2. Mg citrate, colace. Return to ER prn. Dictation disclaimer:  Please note that this dictation was completed with [employer], the computer voice recognition software. Quite often unanticipated grammatical, syntax, homophones, and other interpretive errors are inadvertently transcribed by the computer software. Please disregard these errors. Please excuse any errors that have escaped final proofreading.

## 2022-07-08 NOTE — ROUTINE PROCESS
Sarah Walter is a 36 y.o. male that was discharged in stable. Pt was accompanied by friend. Pt is not driving. The patients diagnosis, condition and treatment were explained to  patient and aftercare instructions were given. The patient verbalized understanding. Patient armband removed and shredded.

## 2022-07-12 ENCOUNTER — HOSPITAL ENCOUNTER (INPATIENT)
Age: 32
LOS: 9 days | Discharge: HOME HEALTH | DRG: 344 | End: 2022-07-21
Attending: EMERGENCY MEDICINE | Admitting: INTERNAL MEDICINE
Payer: MEDICAID

## 2022-07-12 ENCOUNTER — APPOINTMENT (OUTPATIENT)
Dept: MRI IMAGING | Age: 32
DRG: 344 | End: 2022-07-12
Attending: PHYSICIAN ASSISTANT
Payer: MEDICAID

## 2022-07-12 ENCOUNTER — APPOINTMENT (OUTPATIENT)
Dept: CT IMAGING | Age: 32
DRG: 344 | End: 2022-07-12
Attending: PHYSICIAN ASSISTANT
Payer: MEDICAID

## 2022-07-12 ENCOUNTER — APPOINTMENT (OUTPATIENT)
Dept: GENERAL RADIOLOGY | Age: 32
DRG: 344 | End: 2022-07-12
Attending: PHYSICIAN ASSISTANT
Payer: MEDICAID

## 2022-07-12 DIAGNOSIS — F31.9 BIPOLAR AFFECTIVE DISORDER, REMISSION STATUS UNSPECIFIED (HCC): Chronic | ICD-10-CM

## 2022-07-12 DIAGNOSIS — M00.9 SEPTIC ARTHRITIS, DUE TO UNSPECIFIED ORGANISM, SEPTIC ARTHRITIS OF UNSPECIFIED LOCATION (HCC): Primary | ICD-10-CM

## 2022-07-12 DIAGNOSIS — F19.90 IV DRUG USER: ICD-10-CM

## 2022-07-12 DIAGNOSIS — M86.9 OSTEOMYELITIS, UNSPECIFIED SITE, UNSPECIFIED TYPE (HCC): ICD-10-CM

## 2022-07-12 DIAGNOSIS — M00.88: ICD-10-CM

## 2022-07-12 LAB
ALBUMIN SERPL-MCNC: 3.6 G/DL (ref 3.4–5)
ALBUMIN/GLOB SERPL: 0.7 {RATIO} (ref 0.8–1.7)
ALP SERPL-CCNC: 77 U/L (ref 45–117)
ALT SERPL-CCNC: 62 U/L (ref 16–61)
ANION GAP SERPL CALC-SCNC: 7 MMOL/L (ref 3–18)
AST SERPL-CCNC: 31 U/L (ref 10–38)
BASOPHILS # BLD: 0.1 K/UL (ref 0–0.1)
BASOPHILS NFR BLD: 1 % (ref 0–2)
BILIRUB SERPL-MCNC: 0.5 MG/DL (ref 0.2–1)
BUN SERPL-MCNC: 22 MG/DL (ref 7–18)
BUN/CREAT SERPL: 26 (ref 12–20)
CALCIUM SERPL-MCNC: 10.4 MG/DL (ref 8.5–10.1)
CHLORIDE SERPL-SCNC: 101 MMOL/L (ref 100–111)
CO2 SERPL-SCNC: 30 MMOL/L (ref 21–32)
CREAT SERPL-MCNC: 0.84 MG/DL (ref 0.6–1.3)
CRP SERPL-MCNC: 4.6 MG/DL (ref 0–0.3)
DIFFERENTIAL METHOD BLD: ABNORMAL
EOSINOPHIL # BLD: 0.1 K/UL (ref 0–0.4)
EOSINOPHIL NFR BLD: 1 % (ref 0–5)
ERYTHROCYTE [DISTWIDTH] IN BLOOD BY AUTOMATED COUNT: 13 % (ref 11.6–14.5)
ERYTHROCYTE [SEDIMENTATION RATE] IN BLOOD: 1 MM/HR (ref 0–15)
GLOBULIN SER CALC-MCNC: 5.4 G/DL (ref 2–4)
GLUCOSE SERPL-MCNC: 133 MG/DL (ref 74–99)
HCT VFR BLD AUTO: 45 % (ref 36–48)
HGB BLD-MCNC: 15.5 G/DL (ref 13–16)
IMM GRANULOCYTES # BLD AUTO: 0.1 K/UL (ref 0–0.04)
IMM GRANULOCYTES NFR BLD AUTO: 1 % (ref 0–0.5)
LACTATE BLD-SCNC: 1.18 MMOL/L (ref 0.4–2)
LYMPHOCYTES # BLD: 1.9 K/UL (ref 0.9–3.6)
LYMPHOCYTES NFR BLD: 13 % (ref 21–52)
MCH RBC QN AUTO: 30.5 PG (ref 24–34)
MCHC RBC AUTO-ENTMCNC: 34.4 G/DL (ref 31–37)
MCV RBC AUTO: 88.4 FL (ref 78–100)
MONOCYTES # BLD: 0.6 K/UL (ref 0.05–1.2)
MONOCYTES NFR BLD: 4 % (ref 3–10)
NEUTS SEG # BLD: 12 K/UL (ref 1.8–8)
NEUTS SEG NFR BLD: 81 % (ref 40–73)
NRBC # BLD: 0 K/UL (ref 0–0.01)
NRBC BLD-RTO: 0 PER 100 WBC
PLATELET # BLD AUTO: 385 K/UL (ref 135–420)
PMV BLD AUTO: 8.9 FL (ref 9.2–11.8)
POTASSIUM SERPL-SCNC: 4 MMOL/L (ref 3.5–5.5)
PROT SERPL-MCNC: 9 G/DL (ref 6.4–8.2)
RBC # BLD AUTO: 5.09 M/UL (ref 4.35–5.65)
SODIUM SERPL-SCNC: 138 MMOL/L (ref 136–145)
TROPONIN-HIGH SENSITIVITY: 6 NG/L (ref 0–78)
WBC # BLD AUTO: 14.8 K/UL (ref 4.6–13.2)

## 2022-07-12 PROCEDURE — 85652 RBC SED RATE AUTOMATED: CPT

## 2022-07-12 PROCEDURE — 96365 THER/PROPH/DIAG IV INF INIT: CPT

## 2022-07-12 PROCEDURE — 80053 COMPREHEN METABOLIC PANEL: CPT

## 2022-07-12 PROCEDURE — 93005 ELECTROCARDIOGRAM TRACING: CPT

## 2022-07-12 PROCEDURE — 65270000029 HC RM PRIVATE

## 2022-07-12 PROCEDURE — 73502 X-RAY EXAM HIP UNI 2-3 VIEWS: CPT

## 2022-07-12 PROCEDURE — 72148 MRI LUMBAR SPINE W/O DYE: CPT

## 2022-07-12 PROCEDURE — 85025 COMPLETE CBC W/AUTO DIFF WBC: CPT

## 2022-07-12 PROCEDURE — 71045 X-RAY EXAM CHEST 1 VIEW: CPT

## 2022-07-12 PROCEDURE — 74011250636 HC RX REV CODE- 250/636: Performed by: PHYSICIAN ASSISTANT

## 2022-07-12 PROCEDURE — 70450 CT HEAD/BRAIN W/O DYE: CPT

## 2022-07-12 PROCEDURE — 87040 BLOOD CULTURE FOR BACTERIA: CPT

## 2022-07-12 PROCEDURE — 83605 ASSAY OF LACTIC ACID: CPT

## 2022-07-12 PROCEDURE — 96375 TX/PRO/DX INJ NEW DRUG ADDON: CPT

## 2022-07-12 PROCEDURE — 0S973ZZ DRAINAGE OF RIGHT SACROILIAC JOINT, PERCUTANEOUS APPROACH: ICD-10-PCS | Performed by: RADIOLOGY

## 2022-07-12 PROCEDURE — 99285 EMERGENCY DEPT VISIT HI MDM: CPT

## 2022-07-12 PROCEDURE — 74011250637 HC RX REV CODE- 250/637: Performed by: PHYSICIAN ASSISTANT

## 2022-07-12 PROCEDURE — 86140 C-REACTIVE PROTEIN: CPT

## 2022-07-12 PROCEDURE — 84484 ASSAY OF TROPONIN QUANT: CPT

## 2022-07-12 RX ORDER — VANCOMYCIN/0.9 % SOD CHLORIDE 1.5G/250ML
1500 PLASTIC BAG, INJECTION (ML) INTRAVENOUS
Status: COMPLETED | OUTPATIENT
Start: 2022-07-12 | End: 2022-07-13

## 2022-07-12 RX ORDER — VANCOMYCIN HYDROCHLORIDE
1250 EVERY 12 HOURS
Status: DISCONTINUED | OUTPATIENT
Start: 2022-07-13 | End: 2022-07-13

## 2022-07-12 RX ORDER — METHOCARBAMOL 500 MG/1
750 TABLET, FILM COATED ORAL
Status: COMPLETED | OUTPATIENT
Start: 2022-07-12 | End: 2022-07-12

## 2022-07-12 RX ADMIN — METHYLPREDNISOLONE SODIUM SUCCINATE 125 MG: 125 INJECTION, POWDER, FOR SOLUTION INTRAMUSCULAR; INTRAVENOUS at 22:25

## 2022-07-12 RX ADMIN — METHOCARBAMOL 750 MG: 500 TABLET ORAL at 20:48

## 2022-07-12 RX ADMIN — Medication 1500 MG: at 22:40

## 2022-07-12 RX ADMIN — SODIUM CHLORIDE 1000 ML: 9 INJECTION, SOLUTION INTRAVENOUS at 22:24

## 2022-07-12 NOTE — ED PROVIDER NOTES
EMERGENCY DEPARTMENT HISTORY AND PHYSICAL EXAM    Date: 7/12/2022  Patient Name: Lore Castañeda. History of Presenting Illness     Chief Complaint   Patient presents with    Back Pain         History Provided By: patient     Chief Complaint: Back pain  Duration: 1 month  Timing:  Subacute  Location: Low back and right buttock  Quality: Sharp  Severity: 8/10  Modifying Factors: Minimal relief with Tylenol  Associated Symptoms: Hip pain      Additional History (Context): Lore Song is a 36 y.o. male with past medical history of ADHD, depression, and IVDU who presents with 1 month of right sided low back and buttocks pain. Denies any pain radiating down his leg, but does report occasional pain in his hip. Denies any numbness or tingling in the leg. He has been constipated recently, but had a bowel movement earlier today and denies any urinary incontinence. He states that today when he was using the bathroom he fainted after standing, causing him to fall onto the toilet. He thinks that he fell onto his right hip and denies any head strike. States that his back and buttock pain has been much worse since the fall. He has been taking Tylenol for the pain with minimal improvement in his pain. He has past history of IVDU, but states he has been clean for the past 2 months.     PCP: Other, MD Gerardo    Current Facility-Administered Medications   Medication Dose Route Frequency Provider Last Rate Last Admin    vancomycin (VANCOCIN) 1500 mg in  ml infusion  1,500 mg IntraVENous NOW Laura Iverson PA-C        vancomycin [facility]) 1250 mg in  ml infusion  1,250 mg IntraVENous Q12H Laura Iverson PA-C        cefepime (MAXIPIME) 2 g in sterile water (preservative free) 10 mL IV syringe  2 g IntraVENous ONCE Laura Iverson PA-C        cefepime (MAXIPIME) 2 g in 0.9% sodium chloride (MBP/ADV) 100 mL MBP  2 g IntraVENous Q8H Laura Iverson PA-C         Current Outpatient Medications Medication Sig Dispense Refill    HYDROcodone-acetaminophen (Norco) 5-325 mg per tablet Take  by mouth.  methylPREDNISolone (Medrol, Devaughn,) 4 mg tablet Take as directed on label 1 Dose Pack 0    traZODone (DESYREL) 50 mg tablet Take 1 Tab by mouth nightly for 30 days. Indications: insomnia associated with depression 30 Tab 3    albuterol (PROVENTIL HFA, VENTOLIN HFA, PROAIR HFA) 90 mcg/actuation inhaler Take 2 Puffs by inhalation every six (6) hours as needed for Wheezing. 1 Inhaler 0       Past History     Past Medical History:  Past Medical History:   Diagnosis Date    ADHD (attention deficit hyperactivity disorder)     Asthma     B[facility] disorder ([facility]     Facial injury     Hepatitis C     Heroin abuse ([facility]     treatment in [facility] 2016    Lyme disease        Past Surgical History:  Past Surgical History:   Procedure Laterality Date    HX TYMPANOSTOMY      bilateral TM's as a child       Family History:  Family History   Problem Relation Age of Onset    Emphysema Mother    Vipul Colbert COPD Mother     No Known Problems Father     Attention Deficit Hyperactivity Disorder Brother     Diabetes Maternal Grandmother     Stroke Maternal Grandmother     Kidney Disease Maternal Grandfather     Liver Disease Paternal Grandfather         Cirrhosis caused death       Social History:  Social History     Tobacco Use    Smoking status: Current Every Day Smoker     Packs/day: 1.00     Years: 10.00     Pack years: 10.00    Smokeless tobacco: Former User   Substance Use Topics    Alcohol use: Yes     Alcohol/week: 3.0 standard drinks     Types: 3 Cans of beer per week     Comment: Occasional    Drug use: Yes     Types: Marijuana       Allergies: Allergies   Allergen Reactions    Tylenol [Acetaminophen] Other (comments)     \" Have liver problems and should not take\"           Review of Systems   Review of Systems   Constitutional: Negative for chills, fever and unexpected weight change.    HENT: Negative for rhinorrhea and sore throat. Eyes: Negative for pain and visual disturbance. Respiratory: Negative for cough and shortness of breath. Cardiovascular: Negative for chest pain and palpitations. Gastrointestinal: Positive for constipation. Negative for abdominal pain, diarrhea and nausea. Genitourinary: Negative for difficulty urinating and dysuria. Musculoskeletal: Positive for arthralgias and back pain. Negative for neck pain. Right buttock and hip pain. Neurological: Positive for syncope. Negative for dizziness, light-headedness and headaches. Psychiatric/Behavioral: Negative for agitation and confusion. All other systems reviewed and are negative. All Other Systems Negative  Physical Exam     Vitals:    07/12/22 2001   BP: 117/76   Pulse: (!) 102   Resp: 16   Temp: 97.8 °F (36.6 °C)   SpO2: 98%   Weight: 70.3 kg (155 lb)   Height: 5' 11\" (1.803 m)     Physical Exam  Vitals and nursing note reviewed. Constitutional:       General: He is in acute distress. Appearance: He is normal weight. He is not ill-appearing or toxic-appearing. HENT:      Head: Normocephalic and atraumatic. Nose: Nose normal. No rhinorrhea. Mouth/Throat:      Pharynx: Oropharynx is clear. Eyes:      Extraocular Movements: Extraocular movements intact. Pupils: Pupils are equal, round, and reactive to light. Cardiovascular:      Rate and Rhythm: Normal rate and regular rhythm. Heart sounds: Normal heart sounds. No murmur heard. No gallop. Pulmonary:      Effort: Pulmonary effort is normal.      Breath sounds: Normal breath sounds. No wheezing, rhonchi or rales. Abdominal:      General: There is no distension. Palpations: Abdomen is soft. Tenderness: There is no abdominal tenderness. Musculoskeletal:      Cervical back: Normal range of motion. No tenderness. Comments: Tenderness to light palpation of the right buttock.  Unable to palpate lumbar spine as patient is supine and unable to roll over due to pain. Pain with gentle ROM of the right hip. Distal sensation intact to light touch. Skin:     General: Skin is warm and dry. Neurological:      General: No focal deficit present. Mental Status: He is alert and oriented to person, place, and time. Psychiatric:         Mood and Affect: Mood normal.         Behavior: Behavior normal.         Thought Content: Thought content normal.                Diagnostic Study Results     Labs -     Recent Results (from the past 12 hour(s))   CBC WITH AUTOMATED DIFF    Collection Time: 07/12/22  7:55 PM   Result Value Ref Range    WBC 14.8 (H) 4.6 - 13.2 K/uL    RBC 5.09 4.35 - 5.65 M/uL    HGB 15.5 13.0 - 16.0 g/dL    HCT 45.0 36.0 - 48.0 %    MCV 88.4 78.0 - 100.0 FL    MCH 30.5 24.0 - 34.0 PG    MCHC 34.4 31.0 - 37.0 g/dL    RDW 13.0 11.6 - 14.5 %    PLATELET 598 584 - 571 K/uL    MPV 8.9 (L) 9.2 - 11.8 FL    NRBC 0.0 0  WBC    ABSOLUTE NRBC 0.00 0.00 - 0.01 K/uL    NEUTROPHILS 81 (H) 40 - 73 %    LYMPHOCYTES 13 (L) 21 - 52 %    MONOCYTES 4 3 - 10 %    EOSINOPHILS 1 0 - 5 %    BASOPHILS 1 0 - 2 %    IMMATURE GRANULOCYTES 1 (H) 0.0 - 0.5 %    ABS. NEUTROPHILS 12.0 (H) 1.8 - 8.0 K/UL    ABS. LYMPHOCYTES 1.9 0.9 - 3.6 K/UL    ABS. MONOCYTES 0.6 0.05 - 1.2 K/UL    ABS. EOSINOPHILS 0.1 0.0 - 0.4 K/UL    ABS. BASOPHILS 0.1 0.0 - 0.1 K/UL    ABS. IMM.  GRANS. 0.1 (H) 0.00 - 0.04 K/UL    DF AUTOMATED     METABOLIC PANEL, COMPREHENSIVE    Collection Time: 07/12/22  7:55 PM   Result Value Ref Range    Sodium 138 136 - 145 mmol/L    Potassium 4.0 3.5 - 5.5 mmol/L    Chloride 101 100 - 111 mmol/L    CO2 30 21 - 32 mmol/L    Anion gap 7 3.0 - 18 mmol/L    Glucose 133 (H) 74 - 99 mg/dL    BUN 22 (H) 7.0 - 18 MG/DL    Creatinine 0.84 0.6 - 1.3 MG/DL    BUN/Creatinine ratio 26 (H) 12 - 20      GFR est AA >60 >60 ml/min/1.73m2    GFR est non-AA >60 >60 ml/min/1.73m2    Calcium 10.4 (H) 8.5 - 10.1 MG/DL    Bilirubin, total 0.5 0.2 - 1.0 MG/DL ALT (SGPT) 62 (H) 16 - 61 U/L    AST (SGOT) 31 10 - 38 U/L    Alk. phosphatase 77 45 - 117 U/L    Protein, total 9.0 (H) 6.4 - 8.2 g/dL    Albumin 3.6 3.4 - 5.0 g/dL    Globulin 5.4 (H) 2.0 - 4.0 g/dL    A-G Ratio 0.7 (L) 0.8 - 1.7     TROPONIN-HIGH SENSITIVITY    Collection Time: 07/12/22  7:55 PM   Result Value Ref Range    Troponin-High Sensitivity 6 0 - 78 ng/L   C REACTIVE PROTEIN, QT    Collection Time: 07/12/22  7:55 PM   Result Value Ref Range    C-Reactive protein 4.6 (H) 0 - 0.3 mg/dL   SED RATE (ESR)    Collection Time: 07/12/22  7:55 PM   Result Value Ref Range    Sed rate, automated 1 0 - 15 mm/hr   POC LACTIC ACID    Collection Time: 07/12/22  8:40 PM   Result Value Ref Range    Lactic Acid (POC) 1.18 0.40 - 2.00 mmol/L       Radiologic Studies -   MRI LUMB SPINE WO CONT   Final Result   1. Interval developing erosion and right SI joint effusion, suspect septic   joint, with osteomyelitis, and extension of infectious process into the right   iliacus, into the lower pelvis, not completely imaged. Developing posterior   paraspinous muscular edema/inflammation with nonspecific facet joint disease. The posterior paraspinous muscular edema can be secondary to reported recent   trauma. 2. No disc herniation, central or foraminal stenosis otherwise. No epidural   collection suspected. Discussed with KERLINE Kelly 2244 hrs      CT HEAD WO CONT   Final Result   Negative noncontrast head CT. XR HIP RT W OR WO PELV 2-3 VWS    (Results Pending)   XR CHEST PORT    (Results Pending)     CT Results  (Last 48 hours)               07/12/22 2110  CT HEAD WO CONT Final result    Impression:  Negative noncontrast head CT. Narrative:  CT head without IV contrast       HISTORY: Syncope.        All CT scans at this facility are performed using dose optimization technique as   appropriate to a performed exam, to include automated exposure control,   adjustment of the mA and/or kV according to patient size (including appropriate   matching for site specific examination) or use of iterative reconstruction   technique. Cortical sulci and ventricles within normal limits. No midline shift or   extra-axial collection. Brain parenchyma is unremarkable. No intracranial   hemorrhage, mass lesions or cortical infarct involving a major vessel. Visualized paranasal sinuses and mastoid air cells are clear. No skull fracture. CXR Results  (Last 48 hours)    None            Medical Decision Making   I am the first provider for this patient. I reviewed the vital signs, available nursing notes, past medical history, past surgical history, family history and social history. Vital Signs-Reviewed the patient's vital signs. Records Reviewed: Laura Iverson PA-C     Procedures:  Procedures    Provider Notes (Medical Decision Making): Impression:  Back pain, hip pain    IV inserted, solumedrol and robaxin ordered  Labs WBC 14.8, glucose 133, BUN 22, Ca 10.4, normal troponin, ALT 62, CRP 4.6, normal lactic acid, ESR 1      Chest x-ray and hip x-ray negative for definite acute process or fx     9:18 PM pt has not yet been to MRI however spinal epidural abscess is strongly suspected given the hx of IVDU, back pain, elevated WBC and elevated CRP, will start IV abx. vanc and cefepime ordered     CT head negative for acute process   MRI lumbar spine shows erosion and R SI joint, concerning for septic joint and osteomyelitis    Hospitalist on call Dr. Afsaneh Wilcox, agrees to accept the pt for admission but requests ortho consult. Ortho on call Dr. Naz Guadarrama recommends consulting spine surgery, will consult with Dr. Julieth Sutton. 12:40 AM  Spoke with Dr. Christel Batista who agrees to consult the pt.  Laura Iverson PA-C     MED RECONCILIATION:  Current Facility-Administered Medications   Medication Dose Route Frequency    vancomycin (VANCOCIN) 1500 mg in  ml infusion  1,500 mg IntraVENous NOW    vancomycin (VANCOCIN) 1250 mg in  ml infusion  1,250 mg IntraVENous Q12H    cefepime (MAXIPIME) 2 g in sterile water (preservative free) 10 mL IV syringe  2 g IntraVENous ONCE    cefepime (MAXIPIME) 2 g in 0.9% sodium chloride (MBP/ADV) 100 mL MBP  2 g IntraVENous Q8H     Current Outpatient Medications   Medication Sig    HYDROcodone-acetaminophen (Norco) 5-325 mg per tablet Take  by mouth.  methylPREDNISolone (Medrol, Devaughn,) 4 mg tablet Take as directed on label    traZODone (DESYREL) 50 mg tablet Take 1 Tab by mouth nightly for 30 days. Indications: insomnia associated with depression    albuterol (PROVENTIL HFA, VENTOLIN HFA, PROAIR HFA) 90 mcg/actuation inhaler Take 2 Puffs by inhalation every six (6) hours as needed for Wheezing. Disposition:  admitted       Diagnosis     Clinical Impression:   1. Septic arthritis, due to unspecified organism, septic arthritis of unspecified location ([facility] [address]    2. Osteomyelitis, unspecified site, unspecified type ([facility] [address]    3.  IV drug user

## 2022-07-12 NOTE — Clinical Note
Status[de-identified] INPATIENT [101]   Type of Bed: Telemetry [19]   Cardiac Monitoring Required?: Yes   Inpatient Hospitalization Certified Necessary for the Following Reasons: 1.  Patient Failed outpatient treatment (further clarification in H&P documentation)   Admitting Diagnosis: Septic joint [facility]) [[id number]]   Admitting Diagnosis: [facility]el[facility] ([facility] [[id number]]   Admitting Diagnosis: IV drug user [[id number]]   Admitting Physician: Kevin Gunn   Attending Physician: Kevin Gunn   Estimated Length of Stay: 5-7 Midnights   Discharge Plan[de-identified] Home with Office Follow-up

## 2022-07-13 PROBLEM — Z72.0 TOBACCO ABUSE: Chronic | Status: ACTIVE | Noted: 2020-04-01

## 2022-07-13 PROBLEM — F19.90 IV DRUG USER: Status: RESOLVED | Noted: 2022-07-12 | Resolved: 2022-07-13

## 2022-07-13 PROBLEM — F19.91 HISTORY OF INTRAVENOUS DRUG USE IN REMISSION: Status: ACTIVE | Noted: 2022-07-13

## 2022-07-13 PROBLEM — J45.909 ASTHMA: Status: ACTIVE | Noted: 2022-07-13

## 2022-07-13 PROBLEM — Z72.0 TOBACCO ABUSE: Status: ACTIVE | Noted: 2020-04-01

## 2022-07-13 PROBLEM — F31.9 BIPOLAR MOOD DISORDER (HCC): Chronic | Status: ACTIVE | Noted: 2022-07-13

## 2022-07-13 PROBLEM — J45.909 ASTHMA: Chronic | Status: ACTIVE | Noted: 2022-07-13

## 2022-07-13 PROBLEM — B19.20 HEPATITIS C: Chronic | Status: ACTIVE | Noted: 2022-07-13

## 2022-07-13 PROBLEM — B19.20 HEPATITIS C: Status: ACTIVE | Noted: 2022-07-13

## 2022-07-13 PROBLEM — F31.9 BIPOLAR MOOD DISORDER (HCC): Status: ACTIVE | Noted: 2022-07-13

## 2022-07-13 PROBLEM — F19.91 HISTORY OF INTRAVENOUS DRUG USE IN REMISSION: Chronic | Status: ACTIVE | Noted: 2022-07-13

## 2022-07-13 PROBLEM — Z87.898 HISTORY OF INTRAVENOUS DRUG USE IN REMISSION: Status: ACTIVE | Noted: 2022-07-13

## 2022-07-13 LAB
ALBUMIN SERPL-MCNC: 3 G/DL (ref 3.4–5)
ALBUMIN/GLOB SERPL: 0.6 {RATIO} (ref 0.8–1.7)
ALP SERPL-CCNC: 67 U/L (ref 45–117)
ALT SERPL-CCNC: 53 U/L (ref 16–61)
ANION GAP SERPL CALC-SCNC: 5 MMOL/L (ref 3–18)
AST SERPL-CCNC: 24 U/L (ref 10–38)
ATRIAL RATE: 100 BPM
BASOPHILS # BLD: 0 K/UL (ref 0–0.1)
BASOPHILS NFR BLD: 0 % (ref 0–2)
BILIRUB SERPL-MCNC: 0.6 MG/DL (ref 0.2–1)
BUN SERPL-MCNC: 24 MG/DL (ref 7–18)
BUN/CREAT SERPL: 28 (ref 12–20)
CALCIUM SERPL-MCNC: 9.2 MG/DL (ref 8.5–10.1)
CALCULATED P AXIS, ECG09: 72 DEGREES
CALCULATED R AXIS, ECG10: 72 DEGREES
CALCULATED T AXIS, ECG11: 22 DEGREES
CHLORIDE SERPL-SCNC: 103 MMOL/L (ref 100–111)
CO2 SERPL-SCNC: 27 MMOL/L (ref 21–32)
CREAT SERPL-MCNC: 0.86 MG/DL (ref 0.6–1.3)
DIAGNOSIS, 93000: NORMAL
DIFFERENTIAL METHOD BLD: ABNORMAL
EOSINOPHIL # BLD: 0 K/UL (ref 0–0.4)
EOSINOPHIL NFR BLD: 0 % (ref 0–5)
ERYTHROCYTE [DISTWIDTH] IN BLOOD BY AUTOMATED COUNT: 12.8 % (ref 11.6–14.5)
GLOBULIN SER CALC-MCNC: 5.3 G/DL (ref 2–4)
GLUCOSE SERPL-MCNC: 188 MG/DL (ref 74–99)
HCT VFR BLD AUTO: 38.3 % (ref 36–48)
HGB BLD-MCNC: 13.1 G/DL (ref 13–16)
IMM GRANULOCYTES # BLD AUTO: 0 K/UL (ref 0–0.04)
IMM GRANULOCYTES NFR BLD AUTO: 0 % (ref 0–0.5)
INR PPP: 1.1 (ref 0.8–1.2)
LYMPHOCYTES # BLD: 1.1 K/UL (ref 0.9–3.6)
LYMPHOCYTES NFR BLD: 11 % (ref 21–52)
MCH RBC QN AUTO: 30.3 PG (ref 24–34)
MCHC RBC AUTO-ENTMCNC: 34.2 G/DL (ref 31–37)
MCV RBC AUTO: 88.5 FL (ref 78–100)
MONOCYTES # BLD: 0.1 K/UL (ref 0.05–1.2)
MONOCYTES NFR BLD: 1 % (ref 3–10)
NEUTS SEG # BLD: 8.8 K/UL (ref 1.8–8)
NEUTS SEG NFR BLD: 87 % (ref 40–73)
NRBC # BLD: 0 K/UL (ref 0–0.01)
NRBC BLD-RTO: 0 PER 100 WBC
P-R INTERVAL, ECG05: 150 MS
PLATELET # BLD AUTO: 309 K/UL (ref 135–420)
PMV BLD AUTO: 9 FL (ref 9.2–11.8)
POTASSIUM SERPL-SCNC: 4.5 MMOL/L (ref 3.5–5.5)
PROT SERPL-MCNC: 8.3 G/DL (ref 6.4–8.2)
PROTHROMBIN TIME: 15.1 SEC (ref 11.5–15.2)
Q-T INTERVAL, ECG07: 320 MS
QRS DURATION, ECG06: 92 MS
QTC CALCULATION (BEZET), ECG08: 412 MS
RBC # BLD AUTO: 4.33 M/UL (ref 4.35–5.65)
SODIUM SERPL-SCNC: 135 MMOL/L (ref 136–145)
VENTRICULAR RATE, ECG03: 100 BPM
WBC # BLD AUTO: 10.1 K/UL (ref 4.6–13.2)

## 2022-07-13 PROCEDURE — 74011000250 HC RX REV CODE- 250: Performed by: INTERNAL MEDICINE

## 2022-07-13 PROCEDURE — 74011250637 HC RX REV CODE- 250/637: Performed by: INTERNAL MEDICINE

## 2022-07-13 PROCEDURE — 87186 SC STD MICRODIL/AGAR DIL: CPT

## 2022-07-13 PROCEDURE — 74011000258 HC RX REV CODE- 258: Performed by: PHYSICIAN ASSISTANT

## 2022-07-13 PROCEDURE — 80053 COMPREHEN METABOLIC PANEL: CPT

## 2022-07-13 PROCEDURE — 74011000250 HC RX REV CODE- 250: Performed by: PHYSICIAN ASSISTANT

## 2022-07-13 PROCEDURE — 87205 SMEAR GRAM STAIN: CPT

## 2022-07-13 PROCEDURE — 99223 1ST HOSP IP/OBS HIGH 75: CPT | Performed by: INTERNAL MEDICINE

## 2022-07-13 PROCEDURE — 85025 COMPLETE CBC W/AUTO DIFF WBC: CPT

## 2022-07-13 PROCEDURE — 85610 PROTHROMBIN TIME: CPT

## 2022-07-13 PROCEDURE — 74011250636 HC RX REV CODE- 250/636: Performed by: INTERNAL MEDICINE

## 2022-07-13 PROCEDURE — 87077 CULTURE AEROBIC IDENTIFY: CPT

## 2022-07-13 PROCEDURE — 65270000046 HC RM TELEMETRY

## 2022-07-13 PROCEDURE — 2709999900 HC NON-CHARGEABLE SUPPLY

## 2022-07-13 PROCEDURE — 87116 MYCOBACTERIA CULTURE: CPT

## 2022-07-13 PROCEDURE — 74011250636 HC RX REV CODE- 250/636: Performed by: PHYSICIAN ASSISTANT

## 2022-07-13 PROCEDURE — 36415 COLL VENOUS BLD VENIPUNCTURE: CPT

## 2022-07-13 RX ORDER — ACETAMINOPHEN 500 MG
500 TABLET ORAL
COMMUNITY
End: 2022-07-21

## 2022-07-13 RX ORDER — SODIUM CHLORIDE 0.9 % (FLUSH) 0.9 %
5-40 SYRINGE (ML) INJECTION EVERY 8 HOURS
Status: DISCONTINUED | OUTPATIENT
Start: 2022-07-13 | End: 2022-07-21 | Stop reason: HOSPADM

## 2022-07-13 RX ORDER — SODIUM CHLORIDE 0.9 % (FLUSH) 0.9 %
5-40 SYRINGE (ML) INJECTION AS NEEDED
Status: DISCONTINUED | OUTPATIENT
Start: 2022-07-13 | End: 2022-07-21 | Stop reason: HOSPADM

## 2022-07-13 RX ORDER — MORPHINE SULFATE 4 MG/ML
4 INJECTION INTRAVENOUS
Status: COMPLETED | OUTPATIENT
Start: 2022-07-13 | End: 2022-07-13

## 2022-07-13 RX ORDER — MORPHINE SULFATE 2 MG/ML
2-4 INJECTION, SOLUTION INTRAMUSCULAR; INTRAVENOUS
Status: DISCONTINUED | OUTPATIENT
Start: 2022-07-13 | End: 2022-07-20

## 2022-07-13 RX ORDER — ONDANSETRON 4 MG/1
4 TABLET, ORALLY DISINTEGRATING ORAL
Status: DISCONTINUED | OUTPATIENT
Start: 2022-07-13 | End: 2022-07-21 | Stop reason: HOSPADM

## 2022-07-13 RX ORDER — NALOXONE HYDROCHLORIDE 0.4 MG/ML
0.4 INJECTION, SOLUTION INTRAMUSCULAR; INTRAVENOUS; SUBCUTANEOUS
Status: DISCONTINUED | OUTPATIENT
Start: 2022-07-13 | End: 2022-07-21 | Stop reason: HOSPADM

## 2022-07-13 RX ORDER — OXYCODONE AND ACETAMINOPHEN 5; 325 MG/1; MG/1
1 TABLET ORAL
Status: DISCONTINUED | OUTPATIENT
Start: 2022-07-13 | End: 2022-07-21 | Stop reason: HOSPADM

## 2022-07-13 RX ORDER — CHOLECALCIFEROL (VITAMIN D3) 125 MCG
5 CAPSULE ORAL
Status: DISCONTINUED | OUTPATIENT
Start: 2022-07-13 | End: 2022-07-21 | Stop reason: HOSPADM

## 2022-07-13 RX ORDER — IBUPROFEN 200 MG
1 TABLET ORAL DAILY PRN
Status: DISCONTINUED | OUTPATIENT
Start: 2022-07-13 | End: 2022-07-21 | Stop reason: HOSPADM

## 2022-07-13 RX ORDER — ONDANSETRON 2 MG/ML
4 INJECTION INTRAMUSCULAR; INTRAVENOUS
Status: DISCONTINUED | OUTPATIENT
Start: 2022-07-13 | End: 2022-07-21 | Stop reason: HOSPADM

## 2022-07-13 RX ORDER — POLYETHYLENE GLYCOL 3350 17 G/17G
17 POWDER, FOR SOLUTION ORAL DAILY PRN
Status: DISCONTINUED | OUTPATIENT
Start: 2022-07-13 | End: 2022-07-21 | Stop reason: HOSPADM

## 2022-07-13 RX ORDER — IPRATROPIUM BROMIDE AND ALBUTEROL SULFATE 2.5; .5 MG/3ML; MG/3ML
3 SOLUTION RESPIRATORY (INHALATION)
Status: DISCONTINUED | OUTPATIENT
Start: 2022-07-13 | End: 2022-07-21 | Stop reason: HOSPADM

## 2022-07-13 RX ORDER — CHOLECALCIFEROL (VITAMIN D3) 125 MCG
5 CAPSULE ORAL
COMMUNITY

## 2022-07-13 RX ADMIN — SODIUM CHLORIDE, PRESERVATIVE FREE 10 ML: 5 INJECTION INTRAVENOUS at 04:57

## 2022-07-13 RX ADMIN — MORPHINE SULFATE 2 MG: 2 INJECTION, SOLUTION INTRAMUSCULAR; INTRAVENOUS at 04:55

## 2022-07-13 RX ADMIN — CEFEPIME HYDROCHLORIDE 2 G: 2 INJECTION, POWDER, FOR SOLUTION INTRAVENOUS at 14:06

## 2022-07-13 RX ADMIN — CEFEPIME 2 G: 2 INJECTION, POWDER, FOR SOLUTION INTRAVENOUS at 02:06

## 2022-07-13 RX ADMIN — MORPHINE SULFATE 2 MG: 2 INJECTION, SOLUTION INTRAMUSCULAR; INTRAVENOUS at 21:21

## 2022-07-13 RX ADMIN — CEFEPIME HYDROCHLORIDE 2 G: 2 INJECTION, POWDER, FOR SOLUTION INTRAVENOUS at 05:10

## 2022-07-13 RX ADMIN — MORPHINE SULFATE 4 MG: 4 INJECTION, SOLUTION INTRAMUSCULAR; INTRAVENOUS at 01:23

## 2022-07-13 RX ADMIN — SODIUM CHLORIDE, PRESERVATIVE FREE 10 ML: 5 INJECTION INTRAVENOUS at 05:11

## 2022-07-13 RX ADMIN — MORPHINE SULFATE 2 MG: 2 INJECTION, SOLUTION INTRAMUSCULAR; INTRAVENOUS at 09:44

## 2022-07-13 RX ADMIN — MORPHINE SULFATE 2 MG: 2 INJECTION, SOLUTION INTRAMUSCULAR; INTRAVENOUS at 14:08

## 2022-07-13 RX ADMIN — VANCOMYCIN HYDROCHLORIDE 1250 MG: 10 INJECTION, POWDER, LYOPHILIZED, FOR SOLUTION INTRAVENOUS at 10:38

## 2022-07-13 RX ADMIN — SODIUM CHLORIDE, PRESERVATIVE FREE 10 ML: 5 INJECTION INTRAVENOUS at 21:23

## 2022-07-13 RX ADMIN — Medication 5 MG: at 21:23

## 2022-07-13 NOTE — PROGRESS NOTES
0411...paged hospitalist d/t pt requesting pain med and reason for NPO    0412. .. Dr. Gogo Morales will come and assess pt and then order pain meds.  Pt NPO in case procedure needed, can have water no food

## 2022-07-13 NOTE — PROGRESS NOTES
Pharmacy Note     Cefepime has been ordered for treatment of suspected spinal epidural abscess. Per [facility] Policy, Cefepime 1gm IV q8h will be changed to Cefepime 2gm IV push x 1 followed by Cefepime 2gm IV q8h over 240 minutes. Estimated Creatinine Clearance: Estimated Creatinine Clearance: 129 mL/min (based on SCr of 0.84 mg/dL). Dialysis Status, LOUIE, CKD: N/A  BMI:  Body mass index is 21.62 kg/m². Rationale for Adjustment:  [facility] B-Lactam extended infusion policy    Pharmacy will continue to monitor and adjust dose as necessary. Please call with any questions. Thank you,  Tana Reeder.  Steven Lowe

## 2022-07-13 NOTE — CONSULTS
Consult    Patient: Naa Gamez MRN: [id number]  SSN: [id number]    YOB: 1990  Age: 36 y.o. Sex: male      Subjective:      Naa Gamez is a 36 y.o. male who is being seen for back pain. Patient reports pain primarily into his right buttock for the past month. It does not radiate down into his leg. No bowel or bladder dysfunction. No numbness, tingling or weakness. His pain is increased with weight bearing. Past Medical History:   Diagnosis Date    ADHD (attention deficit hyperactivity disorder)     Asthma     Bipolar disorder ([facility] [address]     Facial injury     Hepatitis C     [facility]     [facility]    Lyme disease      Past Surgical History:   Procedure Laterality Date    HX TYMPANOSTOMY      bilateral TM's as a child      Family History   Problem Relation Age of Onset    Emphysema Mother     COPD Mother     No Known Problems Father     Attention Deficit Hyperactivity Disorder Brother     Diabetes Maternal Grandmother     Stroke Maternal Grandmother     Kidney Disease Maternal Grandfather     Liver Disease Paternal Grandfather         Cirrhosis caused death     Social History     Tobacco Use    Smoking status: Current Every Day Smoker     Packs/day: 1.00     Years: 10.00     Pack years: 10.00    Smokeless tobacco: Former User   Substance Use Topics    Alcohol use:  Yes     Alcohol/week: 3.0 standard drinks     Types: 3 Cans of beer per week     Comment: Occasional      Current Facility-Administered Medications   Medication Dose Route Frequency Provider Last Rate Last Admin    sodium chloride (NS) flush 5-40 mL  5-40 mL IntraVENous Q8H Dileep Jaquez DO   10 mL at 07/13/22 0511    sodium chloride (NS) flush 5-40 mL  5-40 mL IntraVENous PRN Dileep Jaquez DO        polyethylene glycol (MIRALAX) packet 17 g  17 g Oral DAILY PRN Dileep Jaquez DO        ondansetron (ZOFRAN ODT) tablet 4 mg  4 mg Oral Q8H PRN Jacqueline Chance DO Or    ondansetron (ZOFRAN) injection 4 mg  4 mg IntraVENous Q6H PRN Dileep Jaquez, DO        albuterol-ipratropium (DUO-NEB) 2.5 MG-0.5 MG/3 ML  3 mL Nebulization Q6H PRN Dileep Jaquez, DO        melatonin tablet 5 mg  5 mg Oral QHS PRN Rickey Jaquez November, DO        nicotine (NICODERM CQ) 21 mg/24 hr patch 1 Patch  1 Patch TransDERmal DAILY PRN Augusto Jones, DO        oxyCODONE-acetaminophen (PERCOCET) 5-325 mg per tablet 1 Tablet  1 Tablet Oral Q4H PRN Augusto Jones, DO        morphine injection 2-4 mg  2-4 mg IntraVENous Q3H PRN Damon BHAKTA, DO   2 mg at 07/13/22 0455    naloxone (NARCAN) injection 0.4 mg  0.4 mg IntraVENous EVERY 2 MINUTES AS NEEDED Rickey Jaquez November, DO        vancomycin (VANCOCIN) 1250 mg in  ml infusion  1,250 mg IntraVENous Q12H Laura Iverson PA-C        cefepime (MAXIPIME) 2 g in 0.9% sodium chloride (MBP/ADV) 100 mL MBP  2 g IntraVENous Q8H Laura Iverson PA-C 25 mL/hr at 07/13/22 0510 2 g at 07/13/22 0510        Allergies   Allergen Reactions    Tylenol [Acetaminophen] Other (comments)     \" Have liver problems and should not take\"         Review of Systems:  A comprehensive review of systems was negative except for that written in the History of Present Illness. Objective:     Vitals:    07/13/22 0133 07/13/22 0148 07/13/22 0200 07/13/22 0354   BP: 109/73 122/69 115/69 112/63   Pulse:   88 85   Resp:   16 16   Temp:   97.9 °F (36.6 °C) 98.4 °F (36.9 °C)   SpO2: 96% 96% 96% 98%   Weight:       Height:            Physical Exam:  General:  Alert, cooperative, no distress, appears stated age. Eyes:  Conjunctivae/corneas clear. PERRL, EOMs intact. Ears:  Normal TMs and external ear canals both ears. Mouth/Throat: Lips, mucosa, and tongue normal. Teeth and gums normal.   Neck: Supple, symmetrical, trachea midline, no adenopathy, thyroid: no enlargment/tenderness/nodules, no carotid bruit and no JVD. Back:   Symmetric, no curvature.  ROM normal. No CVA tenderness. Abdomen:   Soft, non-tender. Bowel sounds normal. No masses,  No organomegaly. Extremities: Extremities normal, atraumatic, no cyanosis or edema. Pulses: 2+ and symmetric all extremities. Skin: Skin color, texture, turgor normal. No rashes or lesions   Neurologic: Normal strength, sensation and reflexes throughout. Assessment:     Hospital Problems  Date Reviewed: 7/13/2022          Codes Class Noted POA    History of intravenous drug use in remission (Chronic) ICD-10-CM: I50.290  ICD-9-CM: 305.93  7/13/2022 Yes        Bipolar mood disorder ([facility] (Chronic) ICD-10-CM: F31.9  ICD-9-CM: 296.80  7/13/2022 Yes        Hepatitis C (Chronic) ICD-10-CM: B19.20  ICD-9-CM: 070.70  7/13/2022 Yes        Asthma (Chronic) ICD-10-CM: J45.909  ICD-9-CM: 493.90  7/13/2022 Yes        * (Principal) Osteomyelitis ([facility] ICD-10-CM: M86.9  ICD-9-CM: 730.20  7/12/2022 Unknown        Septic joint ([facility] ICD-10-CM: M00.9  ICD-9-CM: 711.00  7/12/2022 Unknown        Tobacco abuse (Chronic) ICD-10-CM: Z72.0  ICD-9-CM: 305.1  4/1/2020 Unknown              Plan:     Patient with one month of increasing back and buttock pain. Afebrile. Normal WBC. Neuro intact. Studies concerning for right SI joint infection. IR consulted for aspiration. Consult ID for abx and observation. NPO for tentative procedure.       Signed By: Chanda Montenegro PA-C     July 13, 2022

## 2022-07-13 NOTE — CONSULTS
Infectious Disease Consultation Note        Reason:right SI joint septic arthritis    Current abx Prior abx   Cefepime, vancomycin since 7/12      Lines:       Assessment :  36 y.o. male with PMH significant of IVDA, chronic hepatitis C presented to [facility] ER on 7/12/22 with complaint of right buttock pain and Pain bearing weight on Right Leg. Clinical presentation likely suggestive of right SI joint septic arthritis. Recent history of IV drug abuse predisposes patient to indolent gram-positive/gram-negative infection. Also underlying immunocompromise state secondary to hepatitis C will mask the full clinical presentation. Will need to monitor clinically since patient has risk for developing right paraspinous abscess    Recommendations:    1. Discontinue cefepime, vancomycin to increase the yield of IR guided cultures  2. Agree with IR guided aspiration of right SI joint. Please send fluid for bacterial, fungal, AFB cultures  3. F/u blood cx  4. Pain mx per primary team  5. F/u spine surgery recommendations    Thank you for consultation request. Above plan was discussed in details with patient. Please call me if any further questions or concerns. Will continue to participate in the care of this patient. HPI:  36 y.o. male with PMH significant of IVDA presented to [facility] ER on 7/12/22 with complaint of right buttock pain and Pain bearing weight on Right Leg. Patient reports that he has been experiencing lower back pain for approximately for 1 month and had some previous spinal imaging that did not show anything. Patient states that he has been in remission of IV Heroin Use for 2 months. Patient reports that he has pain in the middle of his right gluteus and reports a \"sharp shooting\" pain that radiates to his right knee whenever he load weight onto the leg.   Patient states that he has been constipated for the last 3 days and states that he has been somewhat avoiding food for fear of defecation for the last 36 hours. Patient denies incontinence or numbness of legs. He came to [facility] ED on 7/12/2022 for further evaluation. MRI lumbar spine revealed findings concerning for right SI joint septic arthritis. Spine surgery consulted. Patient was started on broad-spectrum antibiotics. IR consulted for aspiration of the SI joint. I have been consulted for further recommendations. Patient denies any fever or chills throughout this time. He states that his pain is mainly localized in the right buttock radiating to the right thigh. Denies any pain in the right lower leg. He states that he has had difficulty urinating due to pain in certain positions. Patient denies chills, nausea, vomiting, diarrhea, cough, chest pain, pain with inspiration, and abdominal pain. No prior history of MRSA colonization or infection      Past Medical History:   Diagnosis Date    ADHD (attention deficit hyperactivity disorder)     Asthma     Bipolar disorder ([facility]     Facial injury     Hepatitis C     Heroin abuse ([facility]     treatment in [facility] 2016    Lyme disease        Past Surgical History:   Procedure Laterality Date    HX TYMPANOSTOMY      bilateral TM's as a child       Home meidcations Details   melatonin 5 mg tablet Take 5 mg by mouth nightly. acetaminophen (Tylenol Extra Strength) 500 mg tablet Take 500 mg by mouth every six (6) hours as needed for Pain. albuterol (PROVENTIL HFA, VENTOLIN HFA, PROAIR HFA) 90 mcg/actuation inhaler Take 2 Puffs by inhalation every six (6) hours as needed for Wheezing. Qty: 1 Inhaler, Refills: 0    Associated Diagnoses: Tobacco abuse disorder;  History of asthma             Current Facility-Administered Medications   Medication Dose Route Frequency    sodium chloride (NS) flush 5-40 mL  5-40 mL IntraVENous Q8H    sodium chloride (NS) flush 5-40 mL  5-40 mL IntraVENous PRN    polyethylene glycol (MIRALAX) packet 17 g  17 g Oral DAILY PRN    ondansetron (ZOFRAN ODT) tablet 4 mg  4 mg Oral Q8H PRN    Or    ondansetron (ZOFRAN) injection 4 mg  4 mg IntraVENous Q6H PRN    albuterol-ipratropium (DUO-NEB) 2.5 MG-0.5 MG/3 ML  3 mL Nebulization Q6H PRN    melatonin tablet 5 mg  5 mg Oral QHS PRN    nicotine (NICODERM CQ) 21 mg/24 hr patch 1 Patch  1 Patch TransDERmal DAILY PRN    oxyCODONE-acetaminophen (PERCOCET) 5-325 mg per tablet 1 Tablet  1 Tablet Oral Q4H PRN    morphine injection 2-4 mg  2-4 mg IntraVENous Q3H PRN    naloxone (NARCAN) injection 0.4 mg  0.4 mg IntraVENous EVERY 2 MINUTES AS NEEDED    vancomycin (VANCOCIN) 1250 mg in  ml infusion  1,250 mg IntraVENous Q12H    cefepime (MAXIPIME) 2 g in 0.9% sodium chloride (MBP/ADV) 100 mL MBP  2 g IntraVENous Q8H       Allergies: Tylenol [acetaminophen]    Family History   Problem Relation Age of Onset    Emphysema Mother     COPD Mother     No Known Problems Father     Attention Deficit Hyperactivity Disorder Brother     Diabetes Maternal Grandmother     Stroke Maternal Grandmother     Kidney Disease Maternal Grandfather     Liver Disease Paternal Grandfather         Cirrhosis caused death     Social History     Socioeconomic History    Marital status: SINGLE     Spouse name: Not on file    Number of children: 0    Years of education: 5    Highest education level: GED or equivalent   Occupational History    Occupation:     Tobacco Use    Smoking status: Current Every Day Smoker     Packs/day: 1.00     Years: 10.00     Pack years: 10.00    Smokeless tobacco: Former User   Substance and Sexual Activity    Alcohol use:  Yes     Alcohol/week: 3.0 standard drinks     Types: 3 Cans of beer per week     Comment: Occasional    Drug use: Yes     Types: Marijuana    Sexual activity: Not Currently     Partners: Female   Other Topics Concern     Service No    Blood Transfusions No    Caffeine Concern No    Occupational Exposure Yes     Comment: Dust in [employer] Hazards Yes     Comment: riding horses    Sleep Concern Yes     Comment: \"trouble sleeping\"    Stress Concern Yes    Weight Concern No    Special Diet No    Back Care No    Exercise Yes     Comment: runs    Bike Helmet No    Seat Belt Yes    Self-Exams Yes   Social History Narrative    Not on file     Social Determinants of Health     Financial Resource Strain:     Difficulty of Paying Living Expenses: Not on file   Food Insecurity:     Worried About Running Out of Food in the Last Year: Not on file    Jenny of Food in the Last Year: Not on file   Transportation Needs:     Lack of Transportation (Medical): Not on file    Lack of Transportation (Non-Medical):  Not on file   Physical Activity:     Days of Exercise per Week: Not on file    Minutes of Exercise per Session: Not on file   Stress:     Feeling of Stress : Not on file   Social Connections:     Frequency of Communication with Friends and Family: Not on file    Frequency of Social Gatherings with Friends and Family: Not on file    Attends Sikhism Services: Not on file    Active Member of [address] [place] [address] or Organizations: Not on file    Attends Club or Organization Meetings: Not on file    Marital Status: Not on file   Intimate Partner Violence:     Fear of Current or Ex-Partner: Not on file    Emotionally Abused: Not on file    Physically Abused: Not on file    Sexually Abused: Not on file   Housing Stability:     Unable to Pay for Housing in the Last Year: Not on file    Number of [place] in the Last Year: Not on file    Unstable Housing in the Last Year: Not on file     Social History     Tobacco Use   Smoking Status Current Every Day Smoker    Packs/day: 1.00    Years: 10.00    Pack years: 10.00   Smokeless Tobacco Former User        Temp (24hrs), Av.1 °F (36.7 °C), Min:97.8 °F (36.6 °C), Max:98.4 °F (36.9 °C)    Visit Vitals  /63 (BP 1 Location: Right upper arm, BP Patient Position: Lying)   Pulse 78   Temp 98.2 °F (36.8 °C)   Resp 18   Ht 5' 11\" (1.803 m)   Wt 70.3 kg (155 lb)   SpO2 98%   BMI 21.62 kg/m²       ROS: 12 point ROS obtained in details. Pertinent positives as mentioned in HPI,   otherwise negative    Physical Exam:    General: Well developed, well nourished male laying on the bed/sitting on the  bed AAOx3 in no acute distress. General:   awake alert and oriented   HEENT:  Normocephalic, atraumatic,  EOMI, no scleral icterus or pallor; no conjunctival hemmohage;  nasal and oral mucous are moist and without evidence of lesions. No thrush. Dentition poor. Neck supple, no bruits. Lymph Nodes:   not examined   Lungs:   non-labored, bilaterally clear to auscultation- no crackles wheezes rales or rhonchi   Heart:  RRR, s1 and s2; no rubs or gallops, no edema, + pedal pulses   Abdomen:  soft, non-distended, active bowel sounds, no hepatomegaly, no splenomegaly. Non-tender   Genitourinary:  deferred   Extremities:   no clubbing, cyanosis; no joint effusions or swelling; tenderness over right SI joint/right buttock- no erythema, pain right buttock on movements of RLE; muscle mass appropriate for age   Neurologic:  No gross focal sensory abnormalities; 5/5 muscle strength to upper and lower extremities. Speech appropriate.  Cranial nerves intact                        Skin:  No rash or ulcers noted   Back:  no spinal or paraspinal muscle tenderness or rigidity, no CVA tenderness     Psychiatric:  No suicidal or homicidal ideations, appropriate mood and affect         Labs: Results:   Chemistry Recent Labs     07/13/22  0455 07/12/22 1955   * 133*   * 138   K 4.5 4.0    101   CO2 27 30   BUN 24* 22*   CREA 0.86 0.84   CA 9.2 10.4*   AGAP 5 7   BUCR 28* 26*   AP 67 77   TP 8.3* 9.0*   ALB 3.0* 3.6   GLOB 5.3* 5.4*   AGRAT 0.6* 0.7*      CBC w/Diff Recent Labs     07/13/22  0455 07/12/22 1955   WBC 10.1 14.8*   RBC 4.33* 5.09   HGB 13.1 15.5   HCT 38.3 45.0    385   GRANS 87* 81*   LYMPH 11* 13*   EOS 0 1 Microbiology Recent Labs     07/12/22 2011 07/12/22  1955   CULT NO GROWTH AFTER 10 HOURS NO GROWTH AFTER 10 HOURS          RADIOLOGY:    All available imaging studies/reports in [facility] care for this admission were reviewed      Disclaimer: Sections of this note are dictated utilizing voice recognition software, which may have resulted in some phonetic based errors in grammar and contents. Even though attempts were made to correct all the mistakes, some may have been missed, and remained in the body of the document. If questions arise, please contact our department.     Dr. Darlina Sandifer, Infectious Disease Specialist  [phone]  July 13, 2022  8:56 AM

## 2022-07-13 NOTE — ED NOTES
TRANSFER - OUT REPORT:    Verbal report given to CACHORRO Barber RN on [facility].  being transferred to Room 465 for routine progression of care       Report consisted of patients Situation, Background, Assessment and   Recommendations(SBAR). Information from the following report(s) SBAR, Kardex, ED Summary, [facility] and Recent Results was reviewed with the receiving nurse. Lines:   Peripheral IV 07/12/22 Right Antecubital (Active)   Site Assessment Clean, dry, & intact 07/12/22 2010   Phlebitis Assessment 0 07/12/22 2010   Infiltration Assessment 0 07/12/22 2010   Dressing Status Clean, dry, & intact 07/12/22 2010        Opportunity for questions and clarification was provided.       Patient transported with:   [employer]

## 2022-07-13 NOTE — PROGRESS NOTES
[facility] Pharmacokinetic Monitoring Service - Vancomycin     Sarah Walter is a 36 y.o. male starting on vancomycin therapy for spinal epidural abscess, suspected. Pharmacy consulted by Justus Lovett for monitoring and adjustment. Target Concentration: Goal AUC/IVAN 400-600 mg*hr/L    Additional Antimicrobials: Cefepime    Pertinent Laboratory Values:   Temp: 97.8 °F (36.6 °C)  Weight: 70.3 kg (155 lb)  Recent Labs     07/12/22 1955   CREA 0.84   BUN 22*   WBC 14.8*     Estimated Creatinine Clearance: 129 mL/min (based on SCr of 0.84 mg/dL). Pertinent Cultures:  Culture Date Source Results   7/12 Blood Pending   MRSA Nasal Swab: N/A. Non-respiratory infection    Plan:  1. Dosing recommendations based on Bayesian software  2. Start loading dose of Vancomycin 1500 mg x 1 followed by Vancomycin 1250 mg q12h  a. Anticipated AUC of 523 and trough concentration of 15.1 at steady state  3. Renal labs as indicated   4. No level ordered at this time   5. Pharmacy will continue to monitor patient and adjust therapy as indicated    Thank you for the consult,  Rand Vidal.  NYASIA Garg  7/12/2022

## 2022-07-13 NOTE — PROGRESS NOTES
..  Reason for Admission:  Septic joint ([facility] [M00.9]  Osteomyelitis ([facility] [M86.9]  IV drug user [F19.90]                 RUR Score:    11%           Plan for utilizing home health:    No                     Likelihood of Readmission:   LOW                         Transition of Care Plan:              Initial assessment completed with patient. Cognitive status of patient: oriented to time, place, person and situation. Face sheet information confirmed:  yes. The patient designates mother, Bernice Burciaga, to participate in his discharge plan and to receive any needed information. This patient lives in a single family home, one level with five steps to enter. Patient resides with his mother and adult brother. .  Patient stated he  is not able to navigate steps as needed due to leg pain. Prior to hospitalization, patient was considered to be independent with ADLs/IADLS : yes . Patient has a current ACP document on file: no      Healthcare Decision Maker:     Click here to complete [address] including selection of the Healthcare Decision Maker Relationship (ie \"Primary\")    The mother will be available to transport patient home upon discharge. The patient already has no medical equipment available in the home. Patient is not currently active with home health. Patient has not stayed in a skilled nursing facility or rehab. Was  stay within last 60 days : no. This patient is on dialysis :no     Currently, the discharge plan is Home. The patient states that he can obtain his medications from the pharmacy, and take his medications as directed. Patient's current insurance is None. Patient has applied for  [place] Medicaid through [address] [place] [address]. Care Management Interventions  PCP Verified by CM: Yes  Mode of Transport at Discharge:  Other (see comment) (Pt's mother will provide transport at [facility])  Transition of Care Consult (CM Consult): Discharge Planning  Support Systems: Parent(s)  Confirm Follow Up Transport: Family (pt family will provide transport at [facility])  Discharge Location  Patient Expects to be Discharged to[de-identified] [address] [facility]

## 2022-07-13 NOTE — PROGRESS NOTES
conducted an initial consultation and Spiritual Assessment for Keiko Sanchez, who is a 36 y.o.,male. Patients Primary Language is: [place]. According to the patients EMR Mosque Affiliation is: No preference. The reason the Patient came to the hospital is:   Patient Active Problem List    Diagnosis Date Noted    History of intravenous drug use in remission 07/13/2022    Bipolar mood disorder ([facility] 07/13/2022    Hepatitis C 07/13/2022    Asthma 07/13/2022    Osteomyelitis ([facility] 07/12/2022    Septic joint ([facility] 07/12/2022    Palpitations 04/01/2020    Tobacco abuse 04/01/2020        The  provided the following Interventions:  Initiated a relationship of care and support. Explored issues of keli, belief, spirituality and Taoism/ritual needs while hospitalized. Listened empathically. Provided chaplaincy education. Provided information about Spiritual Care Services. Offered prayer and assurance of continued prayers on patient's behalf. Chart reviewed. The following outcomes where achieved:  Patient shared limited information about both his/her medical narrative and spiritual journey/beliefs. Patient processed feeling about current hospitalization. Patient expressed gratitude for 's visit. Assessment:  Patient does not have any Taoism/cultural needs that will affect patients preferences in health care. There are no spiritual or Taoism issues which require intervention at this time. Plan:  Chaplains will continue to follow and will provide pastoral care on an as needed/requested basis.  recommends bedside caregivers page  on duty if patient shows signs of acute spiritual or emotional distress.     [address] [place] [address]   [phone]

## 2022-07-13 NOTE — H&P
History and Physical    Patient: Tin Bustillo MRN: [id number]  SSN: [id number]    YOB: 1990  Age: 36 y.o. Sex: male      Subjective:      Tin Bustillo is a 36 y.o. male who presents to [facility] ER with complaint of Back and Pain bearing weight on Right Leg. Patient reports that he has been experiencing lower back pain for approximately for 1 month and had some previous spinal imaging that did not show anything. Patient states that he has been in remission of IV Heroin Use for 2.5-3 months. Patient reports that he has pain in the middle of his right gluteus and reports a \"sharp shooting\" pain that radiates to his right knee whenever he load weight onto the leg. Patient states that he has been constipated for the last 3 days and states that he has been somewhat avoiding food for fear of defecation for the last 36 hours. Patient denies incontinence or numbness of legs. Patient states that after he defected yesterday, he lost consciousness from the pain. Patient states the pain is a constant 5/10 without movement and increases with articulation of right hip and maximally with weight-loading. Patient reports that he has had intermittent chills for some time and states that he has had dysuria with reduced urine output and has been excessively sweaty recently. Patient denies chills, nausea, vomiting, diarrhea, cough, chest pain, pain with inspiration, and abdominal pain. Patient is admitted to [facility] Telemetry Unit for management of Osteomyelitis of Sacrum and Septic Sacroiliac Joint thought 2°/2 H/O IV Drug Abuse.     Past Medical History:   Diagnosis Date    ADHD (attention deficit hyperactivity disorder)     Asthma     [facility] disorder [facility]     Facial injury     Hepatitis C     Heroin abuse ([facility]     treatment in [facility] 2016    Lyme disease      Past Surgical History:   Procedure Laterality Date    HX TYMPANOSTOMY      bilateral TM's as a child      Family History Problem Relation Age of Onset    Emphysema Mother     COPD Mother     No Known Problems Father     Attention Deficit Hyperactivity Disorder Brother     Diabetes Maternal Grandmother     Stroke Maternal Grandmother     Kidney Disease Maternal Grandfather     Liver Disease Paternal Grandfather         Cirrhosis caused death     Social History     Tobacco Use    Smoking status: Current Every Day Smoker     Packs/day: 1.00     Years: 10.00     Pack years: 10.00    Smokeless tobacco: Former User   Substance Use Topics    Alcohol use: Yes     Alcohol/week: 3.0 standard drinks     Types: 3 Cans of beer per week     Comment: Occasional      Prior to Admission medications    Medication Sig Start Date End Date Taking? Authorizing Provider   melatonin 5 mg tablet Take 5 mg by mouth nightly. Yes Provider, Historical   acetaminophen (Tylenol Extra Strength) 500 mg tablet Take 500 mg by mouth every six (6) hours as needed for Pain. Provider, Historical   albuterol (PROVENTIL HFA, VENTOLIN HFA, PROAIR HFA) 90 mcg/actuation inhaler Take 2 Puffs by inhalation every six (6) hours as needed for Wheezing.   Patient not taking: Reported on 7/13/2022 12/3/19   Flaco AMBRIZ NP        Allergies   Allergen Reactions    Tylenol [Acetaminophen] Other (comments)     \" Have liver problems and should not take\"         Review of Systems:  (-) Fevers  (+) Chills  (-) Cough  (-) Increased Sputum Production  (-) Pain with Inspiration  (-) Shortness of Breath  (-) Chest Pain  (-) Abdominal Pain  (-) Nausea  (-) Vomiting  (-) Diarrhea  (+) Oliguria  (+) Dysuria  (-) Incontinence  (+) Joint Pain/Swelling  (+) Decreased ROM  (+) Back Pain  (+) Loss of Consciousness  (-) Loss of Sensation  (+) Diaphoresis  All other systems have been reviewed and are negative      Objective:     Vitals:    07/13/22 0200 07/13/22 0354 07/13/22 0744 07/13/22 1143   BP: 115/69 112/63 104/63 (!) 108/55   Pulse: 88 85 78 66   Resp: 16 16 18 18 Temp: 97.9 °F (36.6 °C) 98.4 °F (36.9 °C) 98.2 °F (36.8 °C) 97.8 °F (36.6 °C)   SpO2: 96% 98% 98% 96%   Weight:       Height:            Physical Exam:  General:  Young adult male lying in bed in no acute distress; (+) Clothes are soaked with sweat; (+) Skin is coated in dirt  HEENT:  Atraumatic, normocephalic; Pupils equally round and reactive to light with accommodation; Extraocular muscles intact; Moist Oropharynx without erythema, edema, or exudates  Chest:  No pectus carinatum; No pectus excavatum  Cardiovascular:  Regular rate and rhythm without rubs, gallops, or murmurs  Respiratory:  Clear to Auscultation Bilaterally without wheezes, rales, or rhonchi; normal effort of breathing  Abdominal:  Soft, non-tense, non-tender abdomen; BS present without guarding, rebound, or masses  :  Deferred  Extremities:  Pulses 2+ x4 without edema, clubbing, or cyanosis  Musculoskeletal:  Strength 5/5 and symmetrical in BUE and BLE  Integument:  No rash on face, forearms, or legs  Neurological:  Alert & Ostensibly Oriented x4/4; No gross deficits of Visual Acuity, Eye Movement, Jaw Opening, Facial Expression, Hearing, Phonation, or Head Movement;  No gross deficits of Tongue Movement or Slurring of Speech  Psychiatric:  Affect is appropriate; Language is present and fluent; Behavior is appropriate      Laboratory Studies:  CMP:   Lab Results   Component Value Date/Time     (L) 07/13/2022 04:55 AM    K 4.5 07/13/2022 04:55 AM     07/13/2022 04:55 AM    CO2 27 07/13/2022 04:55 AM    AGAP 5 07/13/2022 04:55 AM     (H) 07/13/2022 04:55 AM    BUN 24 (H) 07/13/2022 04:55 AM    CREA 0.86 07/13/2022 04:55 AM    GFRAA >60 07/13/2022 04:55 AM    GFRNA >60 07/13/2022 04:55 AM    CA 9.2 07/13/2022 04:55 AM    ALB 3.0 (L) 07/13/2022 04:55 AM    TP 8.3 (H) 07/13/2022 04:55 AM    GLOB 5.3 (H) 07/13/2022 04:55 AM    AGRAT 0.6 (L) 07/13/2022 04:55 AM    ALT 53 07/13/2022 04:55 AM     CBC:   Lab Results   Component Value Date/Time    WBC 10.1 07/13/2022 04:55 AM    HGB 13.1 07/13/2022 04:55 AM    HCT 38.3 07/13/2022 04:55 AM     07/13/2022 04:55 AM     All Cardiac Markers in the last 24 hours: No results found for: CPK, CK, CKMMB, CKMB, RCK3, CKMBT, CKNDX, CKND1, BAILEE, TROPT, TROIQ, OCTAVIO, TROPT, TNIPOC, BNP, BNPP  Recent Glucose Results:   Lab Results   Component Value Date/Time     (H) 07/13/2022 04:55 AM     (H) 07/12/2022 07:55 PM     COAGS:   Lab Results   Component Value Date/Time    PTP 15.1 07/13/2022 04:55 AM    INR 1.1 07/13/2022 04:55 AM        Images Reviewed:  XR HIP RT W OR WO PELV 2-3 VWS    Result Date: 7/13/2022  EXAM: Pelvis and right hip x-ray INDICATION:  hip pain, fall TECHNIQUE: AP view of the pelvis with frog-leg view of the right hip COMPARISON: None FINDINGS: The bone density is grossly unremarkable. No evidence of acute fracture of the pelvis. The sacroiliac joints are unremarkable. The pubic symphysis is unremarkable. No lytic or blastic focus identified. No erosions or periostitis appreciated. The femoral heads are well aligned with the osseous pelvis. No evidence of right hip fracture or dislocation. Patient has an os acetabula. The joint spaces are unremarkable. 1.  No acute pathology in the pelvis or the right hip. MRI LUMB SPINE WO CONT    Result Date: 7/12/2022  Sagittal and axial multisequence MR images of lumbar spine were obtained. HISTORY: Right sciatica. COMPARISON: June 22, 2022 Lumbar alignment remains normal. No compression fracture or pathologic marrow signal. Conus medullaris ends at T12 with normal morphology and signal intensity. Discs remain normal in discal height and signal intensity. No disc herniation or central stenosis. No foraminal stenosis. More conspicuous facet inflammation throughout. Developing posterior paraspinous muscular inflammation/edema. Edema between L4 and L5 spinous processes with no bony erosion or contact.  Interval developing erosion with joint effusion at the right sacroiliac joint. Interval developing inflammation along the right iliacus muscle, extending to S2 level, the most inferior image. Psoas muscles remain intact. 1. Interval developing erosion and right SI joint effusion, suspect septic joint, with osteomyelitis, and extension of infectious process into the right iliacus, into the lower pelvis, not completely imaged. Developing posterior paraspinous muscular edema/inflammation with nonspecific facet joint disease. The posterior paraspinous muscular edema can be secondary to reported recent trauma. 2. No disc herniation, central or foraminal stenosis otherwise. No epidural collection suspected. Discussed with KERLINE Kelly 2244 hrs    CT HEAD WO CONT    Result Date: 7/12/2022  CT head without IV contrast HISTORY: Syncope. All CT scans at this facility are performed using dose optimization technique as appropriate to a performed exam, to include automated exposure control, adjustment of the mA and/or kV according to patient size (including appropriate matching for site specific examination) or use of iterative reconstruction technique. Cortical sulci and ventricles within normal limits. No midline shift or extra-axial collection. Brain parenchyma is unremarkable. No intracranial hemorrhage, mass lesions or cortical infarct involving a major vessel. Visualized paranasal sinuses and mastoid air cells are clear. No skull fracture. Negative noncontrast head CT. XR CHEST PORT    Result Date: 7/13/2022  EXAM: Chest Radiograph INDICATION:  syncope TECHNIQUE: AP view of the chest COMPARISON: 9/19/2012 FINDINGS: No pneumothorax identified. The lungs are clear. No infiltrates appreciated. No effusions identified. The cardiomediastinal silhouette is unremarkable. The pulmonary vasculature is unremarkable. The osseous structures are unremarkable. 1.  No acute cardiopulmonary process.         Assessment:     Hospital Problems Date Reviewed: 7/13/2022          Codes Class Noted POA    * (Principal) Osteomyelitis ([facility] ICD-10-CM: M86.9  ICD-9-CM: 730.20  7/12/2022 Unknown        Septic joint ([facility] ICD-10-CM: M00.9  ICD-9-CM: 711.00  7/12/2022 Unknown        History of intravenous drug use in remission (Chronic) ICD-10-CM: W43.530  ICD-9-CM: 305.93  7/13/2022 Yes        Bipolar mood disorder ([facility] (Chronic) ICD-10-CM: F31.9  ICD-9-CM: 296.80  7/13/2022 Yes        Hepatitis C (Chronic) ICD-10-CM: B19.20  ICD-9-CM: 070.70  7/13/2022 Yes        Asthma (Chronic) ICD-10-CM: J45.909  ICD-9-CM: 493.90  7/13/2022 Yes        Tobacco abuse (Chronic) ICD-10-CM: Z72.0  ICD-9-CM: 305.1  4/1/2020 Unknown              Plan:     Osteomyelitis of Sacrum and Septic Sacroiliac Joint thought 2°/2  H/O IV Drug Abuse  Telemetry, NPO (except meds and ice chips), IV Cefepime, IV Vancomycin, PRN Pain Control, and PRN Naloxone. Orthopedic Surgical services consulted in [facility] ER by [facility] ER Clinician. Consult Infectious Disease. Heroin Abuse in Remission (2.5-3 months)    Asthma  PRN Duoneb. Tobacco Abuse  PRN Nicotine Patch. DVT Prophylaxis  DVT mechanoprophylaxis is achieved with SCDs.     Signed By: Kaitlin Coursestevenson,      July 13, 2022

## 2022-07-13 NOTE — ROUTINE PROCESS
Wound Prevention Checklist    Patient: Sarah Walter  (30 y.o. male)  Date: 7/13/2022  Diagnosis: Septic joint ([address] [M00.9]  Osteomyelitis ([address] [M86.9]  IV drug user [F19.90] Osteomyelitis ([address]    Precautions:         []  Heel prevention boots placed on patient    []  Patient turned q2h during shift    []  Lift team ordered    [x]  Patient on Linneus bed/Specialty bed    []  Each Wound is documented during shift (Stage, Color, drainage, odor, measurements, and dressings)    [x]  Dual skin checks done at bedside during shift report with Elizabeth Patterson LPN

## 2022-07-14 ENCOUNTER — HOSPITAL ENCOUNTER (INPATIENT)
Dept: CT IMAGING | Age: 32
Discharge: HOME | DRG: 344 | End: 2022-07-14
Attending: PHYSICIAN ASSISTANT | Admitting: INTERNAL MEDICINE
Payer: MEDICAID

## 2022-07-14 VITALS
HEART RATE: 73 BPM | DIASTOLIC BLOOD PRESSURE: 76 MMHG | OXYGEN SATURATION: 100 % | RESPIRATION RATE: 20 BRPM | SYSTOLIC BLOOD PRESSURE: 117 MMHG

## 2022-07-14 LAB
ALBUMIN SERPL-MCNC: 2.8 G/DL (ref 3.4–5)
ALBUMIN/GLOB SERPL: 0.6 {RATIO} (ref 0.8–1.7)
ALP SERPL-CCNC: 61 U/L (ref 45–117)
ALT SERPL-CCNC: 43 U/L (ref 16–61)
ANION GAP SERPL CALC-SCNC: 3 MMOL/L (ref 3–18)
AST SERPL-CCNC: 20 U/L (ref 10–38)
BASOPHILS # BLD: 0.2 K/UL (ref 0–0.1)
BASOPHILS NFR BLD: 2 % (ref 0–2)
BILIRUB SERPL-MCNC: 0.2 MG/DL (ref 0.2–1)
BUN SERPL-MCNC: 23 MG/DL (ref 7–18)
BUN/CREAT SERPL: 33 (ref 12–20)
CALCIUM SERPL-MCNC: 8.8 MG/DL (ref 8.5–10.1)
CHLORIDE SERPL-SCNC: 105 MMOL/L (ref 100–111)
CO2 SERPL-SCNC: 32 MMOL/L (ref 21–32)
CREAT SERPL-MCNC: 0.7 MG/DL (ref 0.6–1.3)
DIFFERENTIAL METHOD BLD: ABNORMAL
EOSINOPHIL # BLD: 0.1 K/UL (ref 0–0.4)
EOSINOPHIL NFR BLD: 1 % (ref 0–5)
ERYTHROCYTE [DISTWIDTH] IN BLOOD BY AUTOMATED COUNT: 13 % (ref 11.6–14.5)
GLOBULIN SER CALC-MCNC: 4.9 G/DL (ref 2–4)
GLUCOSE SERPL-MCNC: 108 MG/DL (ref 74–99)
HCT VFR BLD AUTO: 37.2 % (ref 36–48)
HGB BLD-MCNC: 12.5 G/DL (ref 13–16)
IMM GRANULOCYTES # BLD AUTO: 0 K/UL
IMM GRANULOCYTES NFR BLD AUTO: 0 %
LYMPHOCYTES # BLD: 4.5 K/UL (ref 0.9–3.6)
LYMPHOCYTES NFR BLD: 38 % (ref 21–52)
MCH RBC QN AUTO: 30.3 PG (ref 24–34)
MCHC RBC AUTO-ENTMCNC: 33.6 G/DL (ref 31–37)
MCV RBC AUTO: 90.1 FL (ref 78–100)
MONOCYTES # BLD: 0.2 K/UL (ref 0.05–1.2)
MONOCYTES NFR BLD: 2 % (ref 3–10)
NEUTS SEG # BLD: 6.8 K/UL (ref 1.8–8)
NEUTS SEG NFR BLD: 57 % (ref 40–73)
NRBC # BLD: 0 K/UL (ref 0–0.01)
NRBC BLD-RTO: 0 PER 100 WBC
PLATELET # BLD AUTO: 301 K/UL (ref 135–420)
PLATELET COMMENTS,PCOM: ABNORMAL
PMV BLD AUTO: 9.2 FL (ref 9.2–11.8)
POTASSIUM SERPL-SCNC: 4.3 MMOL/L (ref 3.5–5.5)
PROT SERPL-MCNC: 7.7 G/DL (ref 6.4–8.2)
RBC # BLD AUTO: 4.13 M/UL (ref 4.35–5.65)
RBC MORPH BLD: ABNORMAL
SODIUM SERPL-SCNC: 140 MMOL/L (ref 136–145)
WBC # BLD AUTO: 11.8 K/UL (ref 4.6–13.2)

## 2022-07-14 PROCEDURE — 36415 COLL VENOUS BLD VENIPUNCTURE: CPT

## 2022-07-14 PROCEDURE — 74011000250 HC RX REV CODE- 250: Performed by: INTERNAL MEDICINE

## 2022-07-14 PROCEDURE — 80053 COMPREHEN METABOLIC PANEL: CPT

## 2022-07-14 PROCEDURE — 85025 COMPLETE CBC W/AUTO DIFF WBC: CPT

## 2022-07-14 PROCEDURE — 74011250636 HC RX REV CODE- 250/636: Performed by: PHYSICIAN ASSISTANT

## 2022-07-14 PROCEDURE — 99232 SBSQ HOSP IP/OBS MODERATE 35: CPT | Performed by: FAMILY MEDICINE

## 2022-07-14 PROCEDURE — 74011250636 HC RX REV CODE- 250/636

## 2022-07-14 PROCEDURE — 74011250636 HC RX REV CODE- 250/636: Performed by: INTERNAL MEDICINE

## 2022-07-14 PROCEDURE — 65270000046 HC RM TELEMETRY

## 2022-07-14 PROCEDURE — 74011250637 HC RX REV CODE- 250/637: Performed by: INTERNAL MEDICINE

## 2022-07-14 PROCEDURE — 10160 PNXR ASPIR ABSC HMTMA BULLA: CPT

## 2022-07-14 PROCEDURE — 74011000250 HC RX REV CODE- 250: Performed by: PHYSICIAN ASSISTANT

## 2022-07-14 PROCEDURE — 87102 FUNGUS ISOLATION CULTURE: CPT

## 2022-07-14 RX ORDER — MIDAZOLAM HYDROCHLORIDE 1 MG/ML
.5-2 INJECTION, SOLUTION INTRAMUSCULAR; INTRAVENOUS
Status: DISPENSED | OUTPATIENT
Start: 2022-07-14 | End: 2022-07-14

## 2022-07-14 RX ORDER — FLUMAZENIL 0.1 MG/ML
0.2 INJECTION INTRAVENOUS
Status: DISPENSED | OUTPATIENT
Start: 2022-07-14 | End: 2022-07-14

## 2022-07-14 RX ORDER — FENTANYL CITRATE 50 UG/ML
12.5-1 INJECTION, SOLUTION INTRAMUSCULAR; INTRAVENOUS
Status: DISPENSED | OUTPATIENT
Start: 2022-07-14 | End: 2022-07-14

## 2022-07-14 RX ORDER — DIPHENHYDRAMINE HYDROCHLORIDE 50 MG/ML
INJECTION, SOLUTION INTRAMUSCULAR; INTRAVENOUS
Status: COMPLETED
Start: 2022-07-14 | End: 2022-07-14

## 2022-07-14 RX ORDER — LIDOCAINE HYDROCHLORIDE 10 MG/ML
20 INJECTION, SOLUTION EPIDURAL; INFILTRATION; INTRACAUDAL; PERINEURAL
Status: COMPLETED | OUTPATIENT
Start: 2022-07-14 | End: 2022-07-14

## 2022-07-14 RX ORDER — NALOXONE HYDROCHLORIDE 0.4 MG/ML
0.2 INJECTION, SOLUTION INTRAMUSCULAR; INTRAVENOUS; SUBCUTANEOUS
Status: ACTIVE | OUTPATIENT
Start: 2022-07-14 | End: 2022-07-14

## 2022-07-14 RX ADMIN — MORPHINE SULFATE 4 MG: 2 INJECTION, SOLUTION INTRAMUSCULAR; INTRAVENOUS at 17:59

## 2022-07-14 RX ADMIN — DIPHENHYDRAMINE HYDROCHLORIDE 50 MG: 50 INJECTION, SOLUTION INTRAMUSCULAR; INTRAVENOUS at 10:25

## 2022-07-14 RX ADMIN — MORPHINE SULFATE 4 MG: 2 INJECTION, SOLUTION INTRAMUSCULAR; INTRAVENOUS at 08:47

## 2022-07-14 RX ADMIN — MORPHINE SULFATE 4 MG: 2 INJECTION, SOLUTION INTRAMUSCULAR; INTRAVENOUS at 13:23

## 2022-07-14 RX ADMIN — SODIUM CHLORIDE, PRESERVATIVE FREE 10 ML: 5 INJECTION INTRAVENOUS at 05:05

## 2022-07-14 RX ADMIN — SODIUM CHLORIDE, PRESERVATIVE FREE 5 ML: 5 INJECTION INTRAVENOUS at 14:00

## 2022-07-14 RX ADMIN — MORPHINE SULFATE 2 MG: 2 INJECTION, SOLUTION INTRAMUSCULAR; INTRAVENOUS at 05:04

## 2022-07-14 RX ADMIN — SODIUM CHLORIDE, PRESERVATIVE FREE 10 ML: 5 INJECTION INTRAVENOUS at 21:10

## 2022-07-14 RX ADMIN — MIDAZOLAM HYDROCHLORIDE 1 MG: 1 INJECTION, SOLUTION INTRAMUSCULAR; INTRAVENOUS at 10:20

## 2022-07-14 RX ADMIN — MORPHINE SULFATE 4 MG: 2 INJECTION, SOLUTION INTRAMUSCULAR; INTRAVENOUS at 01:38

## 2022-07-14 RX ADMIN — MIDAZOLAM HYDROCHLORIDE 1 MG: 1 INJECTION, SOLUTION INTRAMUSCULAR; INTRAVENOUS at 10:15

## 2022-07-14 RX ADMIN — MORPHINE SULFATE 4 MG: 2 INJECTION, SOLUTION INTRAMUSCULAR; INTRAVENOUS at 21:49

## 2022-07-14 RX ADMIN — FENTANYL CITRATE 50 MCG: 50 INJECTION, SOLUTION INTRAMUSCULAR; INTRAVENOUS at 10:20

## 2022-07-14 RX ADMIN — LIDOCAINE HYDROCHLORIDE 20 ML: 10 INJECTION, SOLUTION EPIDURAL; INFILTRATION; INTRACAUDAL; PERINEURAL at 10:15

## 2022-07-14 RX ADMIN — Medication 5 MG: at 21:50

## 2022-07-14 RX ADMIN — FENTANYL CITRATE 50 MCG: 50 INJECTION, SOLUTION INTRAMUSCULAR; INTRAVENOUS at 10:15

## 2022-07-14 NOTE — PROGRESS NOTES
[facility] Hospitalists  Progress Note    Patient: Citlali Morgan. Age: 36 y.o. : 1990 MR#: [id number] SSN: [id number]  Date: 2022     Subjective/24-hour events:     Back to floor from procedure. Hungry wants to eat. Afebrile overnight, no nausea vomiting or diarrhea reported. States that morphine is ineffective and requesting Dilaudid for pain. Assessment:   Septic sacroiliac joint  Sacral osteomyelitis  IVDA - heroin  Tobacco use/active smoker    Plan:   Resume diet. Continue empiric antibiotic therapy per ID. Await culture and sensitivity results of SI joint aspirate. Continue analgesia as necessary, bowel regimen. Patient on 2 to 4 mg of morphine every 3 hours. We will continue this for now and monitor. Mobilize as tolerated. Further plan pending results of cultures and need for antibiotics going forward. Supportive care otherwise. Follow. Case discussed with:  [x]Patient  []Family  []Nursing  [x]Case Management  DVT Prophylaxis:  []Lovenox  []Hep SQ  []SCDs  []Coumadin   []On Heparin gtt    Objective:   VS:   Visit Vitals  BP 96/60 (BP 1 Location: Left upper arm, BP Patient Position: Lying)   Pulse 70   Temp 97.5 °F (36.4 °C)   Resp 18   Ht 5' 11\" (1.803 m)   Wt 70.3 kg (155 lb)   SpO2 99%   BMI 21.62 kg/m²      Tmax/24hrs: Temp (24hrs), Av.8 °F (36.6 °C), Min:97.4 °F (36.3 °C), Max:98.4 °F (36.9 °C)      Intake/Output Summary (Last 24 hours) at 2022 0900  Last data filed at 2022 0853  Gross per 24 hour   Intake 480 ml   Output 1400 ml   Net -920 ml       General: Appears comfortable, in no acute distress. Cardiovascular:  RRR. Pulmonary:  Lungs clear bilaterally, no wheezes. No accessory muscle use. GI:  Abdomen soft, NTTP. Extremities:  Warm, no edema or ischemia. Neuro:  Awake and alert. Moves extremities spontaneously.     Labs:    Recent Results (from the past 24 hour(s))   METABOLIC PANEL, COMPREHENSIVE    Collection Time: 07/14/22  3:13 AM   Result Value Ref Range    Sodium 140 136 - 145 mmol/L    Potassium 4.3 3.5 - 5.5 mmol/L    Chloride 105 100 - 111 mmol/L    CO2 32 21 - 32 mmol/L    Anion gap 3 3.0 - 18 mmol/L    Glucose 108 (H) 74 - 99 mg/dL    BUN 23 (H) 7.0 - 18 MG/DL    Creatinine 0.70 0.6 - 1.3 MG/DL    BUN/Creatinine ratio 33 (H) 12 - 20      GFR est AA >60 >60 ml/min/1.73m2    GFR est non-AA >60 >60 ml/min/1.73m2    Calcium 8.8 8.5 - 10.1 MG/DL    Bilirubin, total 0.2 0.2 - 1.0 MG/DL    ALT (SGPT) 43 16 - 61 U/L    AST (SGOT) 20 10 - 38 U/L    Alk. phosphatase 61 45 - 117 U/L    Protein, total 7.7 6.4 - 8.2 g/dL    Albumin 2.8 (L) 3.4 - 5.0 g/dL    Globulin 4.9 (H) 2.0 - 4.0 g/dL    A-G Ratio 0.6 (L) 0.8 - 1.7     CBC WITH AUTOMATED DIFF    Collection Time: 07/14/22  3:13 AM   Result Value Ref Range    WBC 11.8 4.6 - 13.2 K/uL    RBC 4.13 (L) 4.35 - 5.65 M/uL    HGB 12.5 (L) 13.0 - 16.0 g/dL    HCT 37.2 36.0 - 48.0 %    MCV 90.1 78.0 - 100.0 FL    MCH 30.3 24.0 - 34.0 PG    MCHC 33.6 31.0 - 37.0 g/dL    RDW 13.0 11.6 - 14.5 %    PLATELET 229 694 - 522 K/uL    MPV 9.2 9.2 - 11.8 FL    NRBC 0.0 0  WBC    ABSOLUTE NRBC 0.00 0.00 - 0.01 K/uL    NEUTROPHILS 57 40 - 73 %    LYMPHOCYTES 38 21 - 52 %    MONOCYTES 2 (L) 3 - 10 %    EOSINOPHILS 1 0 - 5 %    BASOPHILS 2 0 - 2 %    IMMATURE GRANULOCYTES 0 %    ABS. NEUTROPHILS 6.8 1.8 - 8.0 K/UL    ABS. LYMPHOCYTES 4.5 (H) 0.9 - 3.6 K/UL    ABS. MONOCYTES 0.2 0.05 - 1.2 K/UL    ABS. EOSINOPHILS 0.1 0.0 - 0.4 K/UL    ABS. BASOPHILS 0.2 (H) 0.0 - 0.1 K/UL    ABS. IMM.  GRANS. 0.0 K/UL    DF MANUAL      PLATELET COMMENTS ADEQUATE PLATELETS      RBC COMMENTS NORMOCYTIC, NORMOCHROMIC         Signed By: Ching Moreno MD     July 14, 2022

## 2022-07-14 NOTE — PROGRESS NOTES
Problem: Pain  Goal: *Control of Pain  Outcome: Progressing Towards Goal  Goal: *PALLIATIVE CARE:  Alleviation of Pain  Outcome: Progressing Towards Goal     Problem: Pressure Injury - Risk of  Goal: *Prevention of pressure injury  Description: Document Osiel Scale and appropriate interventions in the flowsheet. Outcome: Progressing Towards Goal  Note: Pressure Injury Interventions: Activity Interventions: Increase time out of bed,Pressure redistribution bed/mattress(bed type)    Mobility Interventions: HOB 30 degrees or less,Pressure redistribution bed/mattress (bed type)    Nutrition Interventions: Document food/fluid/supplement intake                     Problem: Patient Education: Go to Patient Education Activity  Goal: Patient/Family Education  Outcome: Progressing Towards Goal     Problem: Falls - Risk of  Goal: *Absence of Falls  Description: Document Marc Sahu Fall Risk and appropriate interventions in the flowsheet.   Outcome: Progressing Towards Goal  Note: Fall Risk Interventions:  Mobility Interventions: Bed/chair exit alarm,Patient to call before getting OOB,Strengthening exercises (ROM-active/passive)         Medication Interventions: Assess postural VS orthostatic hypotension,Bed/chair exit alarm,Patient to call before getting OOB,Teach patient to arise slowly    Elimination Interventions: Bed/chair exit alarm,Call light in reach,Patient to call for help with toileting needs,Toilet paper/wipes in reach,Toileting schedule/hourly rounds,Urinal in reach    History of Falls Interventions: Bed/chair exit alarm,Consult care management for discharge planning,Room close to nurse's station,Investigate reason for fall,Vital signs minimum Q4HRs X 24 hrs (comment for end date)         Problem: Patient Education: Go to Patient Education Activity  Goal: Patient/Family Education  Outcome: Progressing Towards Goal

## 2022-07-14 NOTE — PROGRESS NOTES
vss  afeb  Back and buttock pain primarily with motion or ambulation  Plan  NPO   IR for aspiration   ID for abx

## 2022-07-14 NOTE — PROGRESS NOTES
Problem: Pain  Goal: *Control of Pain  Outcome: Progressing Towards Goal  Goal: *PALLIATIVE CARE:  Alleviation of Pain  Outcome: Progressing Towards Goal     Problem: Pressure Injury - Risk of  Goal: *Prevention of pressure injury  Description: Document Osiel Scale and appropriate interventions in the flowsheet. Outcome: Progressing Towards Goal  Note: Pressure Injury Interventions: Activity Interventions: Assess need for specialty bed,Pressure redistribution bed/mattress(bed type)    Mobility Interventions: HOB 30 degrees or less,Pressure redistribution bed/mattress (bed type)    Nutrition Interventions: Document food/fluid/supplement intake                     Problem: Patient Education: Go to Patient Education Activity  Goal: Patient/Family Education  Outcome: Progressing Towards Goal     Problem: Falls - Risk of  Goal: *Absence of Falls  Description: Document Trav Koch Fall Risk and appropriate interventions in the flowsheet.   Outcome: Progressing Towards Goal  Note: Fall Risk Interventions:  Mobility Interventions: Bed/chair exit alarm,Patient to call before getting OOB         Medication Interventions: Bed/chair exit alarm,Patient to call before getting OOB,Teach patient to arise slowly    Elimination Interventions: Bed/chair exit alarm,Call light in reach,Urinal in reach,Toileting schedule/hourly rounds    History of Falls Interventions: Bed/chair exit alarm,Door open when patient unattended,Investigate reason for fall         Problem: Patient Education: Go to Patient Education Activity  Goal: Patient/Family Education  Outcome: Progressing Towards Goal

## 2022-07-14 NOTE — ROUTINE PROCESS
1930 Bedside verbal shift change report received from TARAH, [address] [facility] Pt called Rn to the room stated that he was unable to urinate after trying several time. Pt Bladder scanned. Greater than 510ml in the bladder. Informed pt rn would call . And dr would probably order a catheter to relieve bladder    2145 Pt called RN back in to the room stated that he did not want a vega catheter and he was able to produce some urine. 750ml of dark denise urine noted in urinal. Pt rebladder scanned 0ml in the bladder. 0015 Pt called Rn to the room stated that he was confused about NPO status and that he was allowed to have ice chips last time. Pt requested a call to the dr stated he needed ice chips over night to combat his thirst. Paged Dr. Tejeda Patch on call stated pt is to remain NPO no ice chips allowed. Updated pt.     0800 Bedside verbal shift change report given to SINDI Childress. Skin assessment completed.

## 2022-07-14 NOTE — PROCEDURES
RADIOLOGY POST PROCEDURE NOTE     July 14, 2022       11:03 AM     Preoperative Diagnosis:  Right SI septic joint. Postoperative Diagnosis:  Same. :  Dr. Catarino Walton    Assistant:  None. Type of Anesthesia: 1% plain lidocaine and IV moderate sedation with Versed and Fentanyl. Procedure/Description:  Image guided RSI joint aspiration. Findings:   No bleeding. ~ 1cc of serosanguinous fluid was aspirated and sent for requested labs. Estimated blood Loss:  Minimal    Specimen Removed:   yes    Blood transfusions:  None. Implants:  None.     Complications: None    Condition: Stable    Discharge Plan:  continue present therapy    Florence Randhawa MD

## 2022-07-14 NOTE — PROGRESS NOTES
Infectious Disease progress Note        Reason:right SI joint septic arthritis    Current abx Prior abx   Cefepime, vancomycin 7/12-7/13        Lines:       Assessment :  36 y.o. male with PMH significant of IVDA, chronic hepatitis C presented to [facility] ER on 7/12/22 with complaint of right buttock pain and Pain bearing weight on Right Leg. Clinical presentation concerning for right SI joint septic arthritis. Recent history of IV drug abuse predisposes patient to indolent gram-positive/gram-negative infection. Also underlying immunocompromise state secondary to hepatitis C will mask the full clinical presentation. S/p IR guided aspiration on 7/14/22- 1 cc serosanguineous fluid aspirated. No definitive evidence of infection. Cultures 7/14/22 - staph. aureus    Recommendations:    1. Start iv vancomycin  2. Follow-up ID and susceptibility of staph aureus in joint fluid cultures  3. F/u blood cx  4. Pain mx per primary team  5. F/u spine surgery recommendations regarding I&D right SI joint-       Above plan was discussed in details with patient,dr. Jim Miles, dr. Tracee Bhagat. Please call me if any further questions or concerns. Will continue to participate in the care of this patient. HPI:  Complains pain right buttock. Significant pain. No specific fever/chills. Pain is mainly in the right lower spine and goes up to the right thigh. Past Medical History:   Diagnosis Date    ADHD (attention deficit hyperactivity disorder)     Asthma     [facility]     Facial injury     Hepatitis C     Heroin abuse ([facility] [facility]     treatment in Detox 2016    Lyme disease        Past Surgical History:   Procedure Laterality Date    HX TYMPANOSTOMY      bilateral TM's as a child       Home meidcations Details   melatonin 5 mg tablet Take 5 mg by mouth nightly. acetaminophen (Tylenol Extra Strength) 500 mg tablet Take 500 mg by mouth every six (6) hours as needed for Pain.       albuterol (PROVENTIL HFA, VENTOLIN HFA, PROAIR HFA) 90 mcg/actuation inhaler Take 2 Puffs by inhalation every six (6) hours as needed for Wheezing. Qty: 1 Inhaler, Refills: 0    Associated Diagnoses: Tobacco abuse disorder; History of asthma             Current Facility-Administered Medications   Medication Dose Route Frequency    sodium chloride (NS) flush 5-40 mL  5-40 mL IntraVENous Q8H    sodium chloride (NS) flush 5-40 mL  5-40 mL IntraVENous PRN    polyethylene glycol (MIRALAX) packet 17 g  17 g Oral DAILY PRN    ondansetron (ZOFRAN ODT) tablet 4 mg  4 mg Oral Q8H PRN    Or    ondansetron (ZOFRAN) injection 4 mg  4 mg IntraVENous Q6H PRN    albuterol-ipratropium (DUO-NEB) 2.5 MG-0.5 MG/3 ML  3 mL Nebulization Q6H PRN    melatonin tablet 5 mg  5 mg Oral QHS PRN    nicotine (NICODERM CQ) 21 mg/24 hr patch 1 Patch  1 Patch TransDERmal DAILY PRN    oxyCODONE-acetaminophen (PERCOCET) 5-325 mg per tablet 1 Tablet  1 Tablet Oral Q4H PRN    morphine injection 2-4 mg  2-4 mg IntraVENous Q3H PRN    naloxone (NARCAN) injection 0.4 mg  0.4 mg IntraVENous EVERY 2 MINUTES AS NEEDED       Allergies: Tylenol [acetaminophen]    Family History   Problem Relation Age of Onset    Emphysema Mother     COPD Mother     No Known Problems Father     Attention Deficit Hyperactivity Disorder Brother     Diabetes Maternal Grandmother     Stroke Maternal Grandmother     Kidney Disease Maternal Grandfather     Liver Disease Paternal Grandfather         Cirrhosis caused death     Social History     Socioeconomic History    Marital status: SINGLE     Spouse name: Not on file    Number of children: 0    Years of education: 9    Highest education level: GED or equivalent   Occupational History    Occupation: Prep Cook    Tobacco Use    Smoking status: Current Every Day Smoker     Packs/day: 1.00     Years: 10.00     Pack years: 10.00    Smokeless tobacco: Former User   Substance and Sexual Activity    Alcohol use:  Yes     Alcohol/week: 3.0 standard drinks     Types: 3 Cans of beer per week     Comment: Occasional    Drug use: Yes     Types: Marijuana    Sexual activity: Not Currently     Partners: Female   Other Topics Concern     Service No    Blood Transfusions No    Caffeine Concern No    Occupational Exposure Yes     Comment: Dust in [employer] Hazards Yes     Comment: riding horses    Sleep Concern Yes     Comment: \"trouble sleeping\"    Stress Concern Yes    Weight Concern No    Special Diet No    Back Care No    Exercise Yes     Comment: runs    Bike Helmet No    Seat Belt Yes    Self-Exams Yes   Social History Narrative    Not on file     Social Determinants of Health     Financial Resource Strain:     Difficulty of Paying Living Expenses: Not on file   Food Insecurity:     Worried About Running Out of Food in the Last Year: Not on file    Jenny of Food in the Last Year: Not on file   Transportation Needs:     Lack of Transportation (Medical): Not on file    Lack of Transportation (Non-Medical):  Not on file   Physical Activity:     Days of Exercise per Week: Not on file    Minutes of Exercise per Session: Not on file   Stress:     Feeling of Stress : Not on file   Social Connections:     Frequency of Communication with Friends and Family: Not on file    Frequency of Social Gatherings with Friends and Family: Not on file    Attends Religion Services: Not on file    Active Member of [address] [place] [address] or Organizations: Not on file    Attends Club or Organization Meetings: Not on file    Marital Status: Not on file   Intimate Partner Violence:     Fear of Current or Ex-Partner: Not on file    Emotionally Abused: Not on file    Physically Abused: Not on file    Sexually Abused: Not on file   Housing Stability:     Unable to Pay for Housing in the Last Year: Not on file    Number of [place] in the Last Year: Not on file    Unstable Housing in the Last Year: Not on file     Social History     Tobacco Use Smoking Status Current Every Day Smoker    Packs/day: 1.00    Years: 10.00    Pack years: 10.00   Smokeless Tobacco Former User        Temp (24hrs), Av.7 °F (36.5 °C), Min:97.2 °F (36.2 °C), Max:98.4 °F (36.9 °C)    Visit Vitals  /63 (BP 1 Location: Right upper arm, BP Patient Position: Lying)   Pulse 99   Temp 98.1 °F (36.7 °C)   Resp 18   Ht 5' 11\" (1.803 m)   Wt 70.3 kg (155 lb)   SpO2 97%   BMI 21.62 kg/m²       ROS: 12 point ROS obtained in details. Pertinent positives as mentioned in HPI,   otherwise negative    Physical Exam:    General: Well developed, well nourished male laying on the bed/sitting on the  bed AAOx3 in no acute distress. General:   awake alert and oriented   HEENT:  Normocephalic, atraumatic,  EOMI, no scleral icterus or pallor; no conjunctival hemmohage;  nasal and oral mucous are moist and without evidence of lesions. No thrush. Dentition poor. Neck supple, no bruits. Lymph Nodes:   not examined   Lungs:   non-labored, bilaterally clear to auscultation- no crackles wheezes rales or rhonchi   Heart:  RRR, s1 and s2; no rubs or gallops, no edema, + pedal pulses   Abdomen:  soft, non-distended, active bowel sounds, no hepatomegaly, no splenomegaly. Non-tender   Genitourinary:  deferred   Extremities:   no clubbing, cyanosis; no joint effusions or swelling; tenderness over right SI joint/right buttock- no erythema, pain right buttock on movements of RLE; muscle mass appropriate for age   Neurologic:  No gross focal sensory abnormalities; 5/5 muscle strength to upper and lower extremities. Speech appropriate.  Cranial nerves intact                        Skin:  No rash or ulcers noted   Back:  no spinal or paraspinal muscle tenderness or rigidity, no CVA tenderness     Psychiatric:  No suicidal or homicidal ideations, appropriate mood and affect         Labs: Results:   Chemistry Recent Labs     22  0313 22  0455 22  1955   * 188* 133*    135* 138   K 4.3 4.5 4.0    103 101   CO2 32 27 30   BUN 23* 24* 22*   CREA 0.70 0.86 0.84   CA 8.8 9.2 10.4*   AGAP 3 5 7   BUCR 33* 28* 26*   AP 61 67 77   TP 7.7 8.3* 9.0*   ALB 2.8* 3.0* 3.6   GLOB 4.9* 5.3* 5.4*   AGRAT 0.6* 0.6* 0.7*      CBC w/Diff Recent Labs     07/14/22  0313 07/13/22  0455 07/12/22 1955   WBC 11.8 10.1 14.8*   RBC 4.13* 4.33* 5.09   HGB 12.5* 13.1 15.5   HCT 37.2 38.3 45.0    309 385   GRANS 57 87* 81*   LYMPH 38 11* 13*   EOS 1 0 1      Microbiology Recent Labs     07/13/22  1015 07/12/22 2011 07/12/22 1955   CULT PENDING NO GROWTH 2 DAYS NO GROWTH 2 DAYS          RADIOLOGY:    All available imaging studies/reports in [facility] for this admission were reviewed      Total time spent >35 minutes. High complexity decision making was performed during the evaluation of this patient     Above mentioned total time spent on reviewing the case/medical record/data/notes/EMR/patient examination/documentation/coordinating care with nurse/consultants, exclusive of procedures with complex decision making performed and > 50% time spent in face to face evaluation. Disclaimer: Sections of this note are dictated utilizing voice recognition software, which may have resulted in some phonetic based errors in grammar and contents. Even though attempts were made to correct all the mistakes, some may have been missed, and remained in the body of the document. If questions arise, please contact our department.     Dr. Brenden Frederick, Infectious Disease Specialist  [phone]  July 14, 2022  8:56 AM

## 2022-07-14 NOTE — PROGRESS NOTES
IR reports no finding of infection, just inflammatory changes. Would treat with NSAIDS  No surgical concern. Will sign off.

## 2022-07-14 NOTE — CONSULTS
Interventional Radiology Consult Note  Patient: Angélica Weber Sex: male          DOA: 7/12/2022       YOB: 1990      Age:  36 y.o.        LOS:  LOS: 2 days              Assessment   Right SI joint effusion with erosive changes  Hx IVDA    SI joint effusion aspiration is needed at this time to aid in diagnostics and guide further management. Case and images reviewed by Dr. Letty Post. Plan     1. Image guided SI joint effusion aspiration 7/14/22 as schedule allows  2. NPO since midnight with blood thinning medications held prior to procedure  3. Additional specimen orders per referring team    Thank you,  Gudelia Polk [facility]  [id number]    HPI:     Angélica Weber is a 36 y.o. male who has been seen in evaluation of SI joint effusion with erosive changes at the request of Dr. Suleman Jeff. Angélica Weber presented to [facility] 7/12/22 for right hip pain with fall. Imaging (MR Lumbar Spine 7/12/22) shows a right SI joint effusion with erosive changes concerning for osteomyelitis and possible septic arthritis. Dr. Suleman Jeff and Dr. Carlton Ramos are following. PMHx significant for IVDA. The patient does not take any blood thinning medication at home. Today the patient endorses \"stabbing\" pain to his right hip. He denies N/V, fever, chills, dyspnea, cough, or abdominal pain. The anticipated procedure was discussed in detail including risk of injury, infection, and bleeding. All questions were answered and concerns addressed. Informed consents were obtained.     Past Medical History:   Diagnosis Date    ADHD (attention deficit hyperactivity disorder)     Asthma     B[facility] disorder ([facility] [address]     Facial injury     Hepatitis C     Heroin abuse ([facility] [address]     treatment in [facility] 2016    Lyme disease      Past Surgical History:   Procedure Laterality Date    HX TYMPANOSTOMY      bilateral TM's as a child     Family History   Problem Relation Age of Onset    Emphysema Mother     COPD Mother    Waundm Favorite No Known Problems Father     Attention Deficit Hyperactivity Disorder Brother     Diabetes Maternal Grandmother     Stroke Maternal Grandmother     Kidney Disease Maternal Grandfather     Liver Disease Paternal Grandfather         Cirrhosis caused death     Social History     Socioeconomic History    Marital status: SINGLE    Number of children: 0    Years of education: 5    Highest education level: GED or equivalent   Occupational History    Occupation: Prep Cook    Tobacco Use    Smoking status: Current Every Day Smoker     Packs/day: 1.00     Years: 10.00     Pack years: 10.00    Smokeless tobacco: Former User   Substance and Sexual Activity    Alcohol use: Yes     Alcohol/week: 3.0 standard drinks     Types: 3 Cans of beer per week     Comment: Occasional    Drug use: Yes     Types: Marijuana    Sexual activity: Not Currently     Partners: Female   Other Topics Concern     Service No    Blood Transfusions No    Caffeine Concern No    Occupational Exposure Yes     Comment: Dust in [employer] Hazards Yes     Comment: riding horses    Sleep Concern Yes     Comment: \"trouble sleeping\"    Stress Concern Yes    Weight Concern No    Special Diet No    Back Care No    Exercise Yes     Comment: runs    Bike Helmet No    Seat Belt Yes    Self-Exams Yes     Prior to Admission medications    Medication Sig Start Date End Date Taking? Authorizing Provider   melatonin 5 mg tablet Take 5 mg by mouth nightly. Yes Provider, Historical   acetaminophen (Tylenol Extra Strength) 500 mg tablet Take 500 mg by mouth every six (6) hours as needed for Pain. Provider, Historical   albuterol (PROVENTIL HFA, VENTOLIN HFA, PROAIR HFA) 90 mcg/actuation inhaler Take 2 Puffs by inhalation every six (6) hours as needed for Wheezing.   Patient not taking: Reported on 7/13/2022 12/3/19   Rosey Oppenheim D, NP     Allergies   Allergen Reactions    Tylenol [Acetaminophen] Other (comments)     \" Have liver problems and should not take\"       Review of Systems  As above    Physical Exam:      Visit Vitals  BP 96/60 (BP 1 Location: Left upper arm, BP Patient Position: Lying)   Pulse 70   Temp 97.5 °F (36.4 °C)   Resp 18   Ht 5' 11\" (1.803 m)   Wt 70.3 kg (155 lb)   SpO2 99%   BMI 21.62 kg/m²     Physical Exam:  Constitutional: Awake and alert and oriented x 4, NAD  Respiratory: Normal work of breathing, equal chest rise and fall  Cardiovascular: RRR  Gastrointestinal: Soft NT ND  Extremities: Skin warm and dry, moves all four     Labs Reviewed:  CMP:   Lab Results   Component Value Date/Time     07/14/2022 03:13 AM    K 4.3 07/14/2022 03:13 AM     07/14/2022 03:13 AM    CO2 32 07/14/2022 03:13 AM    AGAP 3 07/14/2022 03:13 AM     (H) 07/14/2022 03:13 AM    BUN 23 (H) 07/14/2022 03:13 AM    CREA 0.70 07/14/2022 03:13 AM    GFRAA >60 07/14/2022 03:13 AM    GFRNA >60 07/14/2022 03:13 AM    CA 8.8 07/14/2022 03:13 AM    ALB 2.8 (L) 07/14/2022 03:13 AM    TP 7.7 07/14/2022 03:13 AM    GLOB 4.9 (H) 07/14/2022 03:13 AM    AGRAT 0.6 (L) 07/14/2022 03:13 AM    ALT 43 07/14/2022 03:13 AM     CBC:   Lab Results   Component Value Date/Time    WBC 11.8 07/14/2022 03:13 AM    HGB 12.5 (L) 07/14/2022 03:13 AM    HCT 37.2 07/14/2022 03:13 AM     07/14/2022 03:13 AM     COAGS: INR 1.1    Blood Thinners:  None

## 2022-07-14 NOTE — PROGRESS NOTES
TRANSFER - OUT REPORT:    Verbal report given to Nicolette RN(name) on Stephanie Motts.  being transferred to (unit) for routine post - op       Report consisted of patients Situation, Background, Assessment and   Recommendations(SBAR). Information from the following report(s) SBAR, Kardex, Procedure Summary and MAR was reviewed with the receiving nurse. Lines:   Peripheral IV 07/12/22 Right Antecubital (Active)   Site Assessment Clean, dry, & intact 07/14/22 0852   Phlebitis Assessment 0 07/14/22 0852   Infiltration Assessment 0 07/14/22 0852   Dressing Status Clean, dry, & intact 07/14/22 0852   Dressing Type Tape;Trach dressing 07/14/22 0852   Hub Color/Line Status Pink;Flushed;Capped 07/14/22 0754   Action Taken Open ports on tubing capped 07/14/22 0852   Alcohol Cap Used Yes 07/14/22 0480        Opportunity for questions and clarification was provided.       Patient transported with:   Registered Nurse  Tech

## 2022-07-14 NOTE — ROUTINE PROCESS
Patient request a diet order. Nurse spoke to Dr Michael Vuong regarding patient diet. Received order for regular diet.

## 2022-07-15 LAB
ANION GAP SERPL CALC-SCNC: 3 MMOL/L (ref 3–18)
BASOPHILS # BLD: 0.1 K/UL (ref 0–0.1)
BASOPHILS NFR BLD: 1 % (ref 0–2)
BUN SERPL-MCNC: 16 MG/DL (ref 7–18)
BUN/CREAT SERPL: 26 (ref 12–20)
CALCIUM SERPL-MCNC: 9.2 MG/DL (ref 8.5–10.1)
CHLORIDE SERPL-SCNC: 104 MMOL/L (ref 100–111)
CO2 SERPL-SCNC: 32 MMOL/L (ref 21–32)
CREAT SERPL-MCNC: 0.62 MG/DL (ref 0.6–1.3)
DIFFERENTIAL METHOD BLD: ABNORMAL
EOSINOPHIL # BLD: 0.2 K/UL (ref 0–0.4)
EOSINOPHIL NFR BLD: 2 % (ref 0–5)
ERYTHROCYTE [DISTWIDTH] IN BLOOD BY AUTOMATED COUNT: 12.9 % (ref 11.6–14.5)
GLUCOSE SERPL-MCNC: 95 MG/DL (ref 74–99)
HCT VFR BLD AUTO: 39.7 % (ref 36–48)
HGB BLD-MCNC: 12.9 G/DL (ref 13–16)
IMM GRANULOCYTES # BLD AUTO: 0 K/UL (ref 0–0.04)
IMM GRANULOCYTES NFR BLD AUTO: 0 % (ref 0–0.5)
LYMPHOCYTES # BLD: 3.5 K/UL (ref 0.9–3.6)
LYMPHOCYTES NFR BLD: 40 % (ref 21–52)
MCH RBC QN AUTO: 29.8 PG (ref 24–34)
MCHC RBC AUTO-ENTMCNC: 32.5 G/DL (ref 31–37)
MCV RBC AUTO: 91.7 FL (ref 78–100)
MONOCYTES # BLD: 0.6 K/UL (ref 0.05–1.2)
MONOCYTES NFR BLD: 7 % (ref 3–10)
NEUTS SEG # BLD: 4.4 K/UL (ref 1.8–8)
NEUTS SEG NFR BLD: 50 % (ref 40–73)
NRBC # BLD: 0 K/UL (ref 0–0.01)
NRBC BLD-RTO: 0 PER 100 WBC
PLATELET # BLD AUTO: 285 K/UL (ref 135–420)
PMV BLD AUTO: 9.3 FL (ref 9.2–11.8)
POTASSIUM SERPL-SCNC: 4.4 MMOL/L (ref 3.5–5.5)
RBC # BLD AUTO: 4.33 M/UL (ref 4.35–5.65)
SODIUM SERPL-SCNC: 139 MMOL/L (ref 136–145)
WBC # BLD AUTO: 8.8 K/UL (ref 4.6–13.2)

## 2022-07-15 PROCEDURE — 74011250637 HC RX REV CODE- 250/637: Performed by: INTERNAL MEDICINE

## 2022-07-15 PROCEDURE — 97161 PT EVAL LOW COMPLEX 20 MIN: CPT

## 2022-07-15 PROCEDURE — 97116 GAIT TRAINING THERAPY: CPT

## 2022-07-15 PROCEDURE — 74011000250 HC RX REV CODE- 250: Performed by: INTERNAL MEDICINE

## 2022-07-15 PROCEDURE — 65270000046 HC RM TELEMETRY

## 2022-07-15 PROCEDURE — 74011250636 HC RX REV CODE- 250/636: Performed by: INTERNAL MEDICINE

## 2022-07-15 PROCEDURE — 99232 SBSQ HOSP IP/OBS MODERATE 35: CPT | Performed by: FAMILY MEDICINE

## 2022-07-15 PROCEDURE — 80048 BASIC METABOLIC PNL TOTAL CA: CPT

## 2022-07-15 PROCEDURE — 97110 THERAPEUTIC EXERCISES: CPT

## 2022-07-15 PROCEDURE — 85025 COMPLETE CBC W/AUTO DIFF WBC: CPT

## 2022-07-15 PROCEDURE — 36415 COLL VENOUS BLD VENIPUNCTURE: CPT

## 2022-07-15 PROCEDURE — 2709999900 HC NON-CHARGEABLE SUPPLY

## 2022-07-15 RX ORDER — VANCOMYCIN 1.75 GRAM/500 ML IN 0.9 % SODIUM CHLORIDE INTRAVENOUS
1750 ONCE
Status: COMPLETED | OUTPATIENT
Start: 2022-07-15 | End: 2022-07-15

## 2022-07-15 RX ORDER — VANCOMYCIN HYDROCHLORIDE
1250 EVERY 12 HOURS
Status: DISCONTINUED | OUTPATIENT
Start: 2022-07-16 | End: 2022-07-16

## 2022-07-15 RX ADMIN — MORPHINE SULFATE 2 MG: 2 INJECTION, SOLUTION INTRAMUSCULAR; INTRAVENOUS at 09:12

## 2022-07-15 RX ADMIN — MORPHINE SULFATE 4 MG: 2 INJECTION, SOLUTION INTRAMUSCULAR; INTRAVENOUS at 02:22

## 2022-07-15 RX ADMIN — OXYCODONE HYDROCHLORIDE AND ACETAMINOPHEN 1 TABLET: 5; 325 TABLET ORAL at 16:18

## 2022-07-15 RX ADMIN — SODIUM CHLORIDE, PRESERVATIVE FREE 10 ML: 5 INJECTION INTRAVENOUS at 05:02

## 2022-07-15 RX ADMIN — SODIUM CHLORIDE 1750 MG: 9 INJECTION, SOLUTION INTRAVENOUS at 20:39

## 2022-07-15 RX ADMIN — OXYCODONE HYDROCHLORIDE AND ACETAMINOPHEN 1 TABLET: 5; 325 TABLET ORAL at 20:58

## 2022-07-15 RX ADMIN — MORPHINE SULFATE 2 MG: 2 INJECTION, SOLUTION INTRAMUSCULAR; INTRAVENOUS at 12:47

## 2022-07-15 NOTE — PROGRESS NOTES
Bedside and Verbal shift change report given to Elin Morrissey (oncoming nurse) by Loni Villalta RN (offgoing nurse). Report included the following information SBAR, Kardex, Intake/Output, MAR, Recent Results and Med Rec Status. Wound Prevention Checklist    Patient: Venancio Amaya  (30 y.o. male)  Date: 7/14/2022  Diagnosis: Septic joint ([address] [M00.9]  Osteomyelitis ([address] [M86.9]  IV drug user [F19.90] Osteomyelitis ([address]    Precautions:         []  Heel prevention boots placed on patient    []  Patient turned q2h during shift    []  Lift team ordered    []  Patient on [place] bed/Specialty bed    [x]  Each Wound is documented during shift (Stage, Color, drainage, odor, measurements, and dressings)    Noted on skin during shift change:  - R AC - PIV, 20 gauge  - Scattered scars on bilateral forearms  - Poor dentition    [x]  Dual skin checks done at bedside during shift report with Loni Villalta RN    [place]

## 2022-07-15 NOTE — PROGRESS NOTES
[address] [facility] Pharmacokinetic Monitoring Service - Vancomycin     Dallin Ramos is a 36 y.o. male starting on vancomycin therapy for bone and joint infection. Pharmacy consulted by Monet Townsend for monitoring and adjustment. Target Concentration: Goal AUC/IVAN 400-600 mg*hr/L    Additional Antimicrobials:n/a    Pertinent Laboratory Values: Wt Readings from Last 1 Encounters:   07/12/22 70.3 kg (155 lb)     Temp Readings from Last 1 Encounters:   07/15/22 98.7 °F (37.1 °C)     No components found for: PROCAL  Estimated Creatinine Clearance: 154.8 mL/min (by C-G formula based on SCr of 0.62 mg/dL). Recent Labs     07/15/22  0209 07/14/22  0313   WBC 8.8 11.8       Pertinent Cultures:  Culture Date Source Results   7/15/22 N/a N/a   MRSA Nasal Swab: N/A. Non-respiratory infection. .    Plan:  Dosing recommendations based on Bayesian software  Start vancomycin 1750 mg once over 120 minutes  Vancomycin concentration ordered for 7/16/22 @ 0400   Pharmacy will continue to monitor patient and adjust therapy as indicated    Thank you for the consult,  NYASIA Hylton  7/15/2022 6:32 PM

## 2022-07-15 NOTE — PROGRESS NOTES
Bedside and Verbal shift change report given to Cameron Galvan RN (oncoming nurse) by Murrell Najjar, RN (offgoing nurse). Report included the following information SBAR, Kardex, Intake/Output, MAR, Recent Results and Med Rec Status.

## 2022-07-15 NOTE — PROGRESS NOTES
Problem: Mobility Impaired (Adult and Pediatric)  Goal: *Acute Goals and Plan of Care (Insert Text)  Description: Physical Therapy Goals  Initiated 7/15/2022 and to be accomplished within 7 day(s)  1. Patient will move from supine to sit and sit to supine , scoot up and down, and roll side to side in bed with modified independence. 2.  Patient will transfer from bed to chair and chair to bed with modified independence using the least restrictive device. 3.  Patient will perform sit to stand with modified independence. 4.  Patient will ambulate with modified independence for 75 feet with the least restrictive device. 5.  Patient will ascend/descend 5 stairs with unilateral handrail(s) with supervision/set-up. PLOF: Pt reporting independent prior to 1 month ago with increased R hip and R sided low back pain. Pt reports now needing a RW for mobility and stays mostly in the bed. Lives with family in 1 story house with 5 LULY. Outcome: Progressing Towards Goal   PHYSICAL THERAPY EVALUATION    Patient: Lore Song (30 y.o. male)  Date: 7/15/2022  Primary Diagnosis: Septic joint ([facility] [address] [M00.9]  Osteomyelitis ([facility] [address] [M86.9]  IV drug user [F19.90]        Precautions:   (standard )    ASSESSMENT :  Pt cleared to participate in PT session, pt received sitting on BSC, finishing toileting and agreeable to therapy session. Based on the objective data described below, the patient presents with decreased endurance, decreased strength, decreased balance reactions, gait deviations, increased pain, and decreased independence in functional mobility. Pt completing pericare in sitting with independence. Standing with CGA and increased time to RW, SPT to bed with CGA. Pt demonstrating decreased active RLE ROM due to pain. Working on active assisted LAQ in short sitting on EOB as well as self assisted hip flexion. Pt able to achieve figure four position at EOB with time, able to complete hip ER stretch.  Reporting decreased pain with reps. Pt then standing with CGA to RW, working on weight shift to RLE with foot flat due to preference for toe touch weight bearing with hip and knee flexion. Pt able to put full weight on RLE with reps. Agreeable to ambulating in room x20 feet with slow gait speed but continued to be able to step with foot flat. Pt agreeable to sitting up in recliner. Pt positioned for comfort and educated to call for assist before getting up, pt verbalized understanding. Pt left with all needs met and call bell in reach. RN notified of position and participation. Patient will benefit from skilled intervention to address the above impairments. Patient's rehabilitation potential is considered to be Fair  Factors which may influence rehabilitation potential include:   []         None noted  []         Mental ability/status  []         Medical condition  []         Home/family situation and support systems  []         Safety awareness  [x]         Pain tolerance/management  []         Other:      PLAN :  Recommendations and Planned Interventions:   [x]           Bed Mobility Training             []    Neuromuscular Re-Education  [x]           Transfer Training                   []    Orthotic/Prosthetic Training  [x]           Gait Training                          []    Modalities  [x]           Therapeutic Exercises           []    Edema Management/Control  [x]           Therapeutic Activities            [x]    Family Training/Education  [x]           Patient Education  []           Other (comment):    Frequency/Duration: Patient will be followed by physical therapy 1-2 times per day/4-7 days per week to address goals. Discharge Recommendations: Home Health  Further Equipment Recommendations for Discharge: rolling walker    [facility]: Horacio Mckeon. scored a 15/20 if omitting stairs on the AM-PAC short mobility form.      Current research shows that an AM-PAC score of 14 if omitting stairs or greater is typically associated with a discharge to the patient's home setting. Based on the patient's AM-PAC score and their current functional mobility deficits, it is recommended that the patient have 2-3 sessions per week of Physical Therapy at d/c to increase the patient's independence. At this time, this patient demonstrates the endurance and safety to discharge home with [address] (home vs OP services) and a follow up treatment frequency of 2-3x/wk. Please see assessment section for further patient specific details. This AMPAC score should be considered in conjunction with interdisciplinary team recommendations to determine the most appropriate discharge setting. Patient's social support, diagnosis, medical stability, and prior level of function should also be taken into consideration. SUBJECTIVE:   Patient stated It is already feeling better.     OBJECTIVE DATA SUMMARY:     Past Medical History:   Diagnosis Date    ADHD (attention deficit hyperactivity disorder)     Asthma     Bipolar disorder ([facility] [address]     Facial injury     Hepatitis C     Heroin abuse ([facility] [address]     treatment in [facility] 2016    Lyme disease      Past Surgical History:   Procedure Laterality Date    HX TYMPANOSTOMY      bilateral TM's as a child     Barriers to Learning/Limitations: None  Compensate with: N/A  Home Situation:  Home Situation  Home Environment: Private residence  # Steps to Enter: 5  Rails to Enter: Yes  Hand Rails : Bilateral  Wheelchair Ramp: No  One/Two Story Residence: One story  Living Alone: No  Support Systems: Parent(s)  Patient Expects to be Discharged to[de-identified] Home  Current DME Used/Available at Home: [place] Behavior:  Neurologic State: Alert  Orientation Level: Oriented X4  Cognition: Appropriate decision making  Safety/Judgement: Awareness of environment; Fall prevention  Psychosocial  Patient Behaviors: Calm; Cooperative  Purposeful Interaction: Yes  Pt Identified Daily Priority: Clinical issues (comment)  Kike Process: Nurture loving kindness;Establish trust;Teaching/learning; Attend basic human needs;Create healing environment  Caring Interventions: Reassure  Reassure: Informing; Acceptance;Caring rounds  Therapeutic Modalities: Humor; Intentional therapeutic touch  Skin Condition/Temp: Warm;Dry     Skin Integrity: Scars (comment) (Shahzad. forearms)  Skin Integumentary  Skin Color: Appropriate for ethnicity  Skin Condition/Temp: Warm;Dry  Skin Integrity: Scars (comment) (Shahzad. forearms)     Strength:    Strength: Generally decreased, functional    Tone & Sensation:   Tone: Normal    Sensation: Intact    Range Of Motion:  AROM: Generally decreased, functional (RLE due to pain )    Posture:  Posture (WDL): Within defined limits     Functional Mobility:  Bed Mobility:     Supine to Sit:  (found on BS )     Scooting: Stand-by assistance  Transfers:  Sit to Stand: Stand-by assistance; Additional time  Stand to Sit: Stand-by assistance  Stand Pivot Transfers: Contact guard assistance       Balance:   Sitting: Intact  Standing: Intact; With support    Ambulation/Gait Training:  Distance (ft): 20 Feet (ft)  Assistive Device: Walker, rolling  Ambulation - Level of Assistance: Contact guard assistance;Stand-by assistance     Gait Description (WDL): Exceptions to WDL  Gait Abnormalities: Decreased step clearance    Base of Support: Center of gravity altered;Shift to left     Speed/Amrita: Slow;Shuffled  Step Length: Right shortened;Left shortened      Pain:  Pain level pre-treatment: 8/10   Pain level post-treatment: 8/10   Pain Intervention(s) :  Rest,  Repositioning  Response to intervention: Nurse notified    Activity Tolerance:   Fair tolerance     Please refer to the flowsheet for vital signs taken during this treatment.   After treatment:   [x]         Patient left in no apparent distress sitting up in chair  []         Patient left in no apparent distress in bed  [x]         Call bell left within reach  [x]         Nursing notified  []         Caregiver present  []         Bed alarm activated  []         SCDs applied    COMMUNICATION/EDUCATION:   [x]         Role of Physical Therapy in the acute care setting. [x]         Fall prevention education was provided and the patient/caregiver indicated understanding. [x]         Patient/family have participated as able in goal setting and plan of care. [x]         Patient/family agree to work toward stated goals and plan of care. []         Patient understands intent and goals of therapy, but is neutral about his/her participation. []         Patient is unable to participate in goal setting/plan of care: ongoing with therapy staff.  []         Other: Thank you for this referral.  Paty Narvaez, PT   Time Calculation: 46 mins      Eval Complexity: History: MEDIUM  Complexity : 1-2 comorbidities / personal factors will impact the outcome/ POC Exam:LOW Complexity : 1-2 Standardized tests and measures addressing body structure, function, activity limitation and / or participation in recreation  Presentation: LOW Complexity : Stable, uncomplicated  Clinical Decision Making:Low Complexity low  Overall Complexity:LOW     [employer] AM-PAC® Basic Mobility Inpatient Short Form (6-Clicks) Version 2    How much HELP from another person does the patient currently need    (If the patient hasn't done an activity recently, how much help from another person do you think he/she would need if he/she tried?)   Total (Total A or Dep)   A Lot  (Mod to Max A)   A Little (Sup or Min A)   None (Mod I to I)   Turning from your back to your side while in a flat bed without using bedrails? [] 1 [] 2 [x] 3 [] 4   2. Moving from lying on your back to sitting on the side of a flat bed without using bedrails? [] 1 [] 2 [x] 3 [] 4   3. Moving to and from a bed to a chair (including a wheelchair)? [] 1 [] 2 [x] 3 [] 4   4.  Standing up from a chair using your arms (e.g., wheelchair, or bedside chair)? [] 1 [] 2 [x] 3 [] 4   5. Walking in hospital room? [] 1 [] 2 [x] 3 [] 4          +If stair climbing cannot be assessed, skip item #6. Sum responses from items 1-5.

## 2022-07-15 NOTE — PROGRESS NOTES
Problem: Pain  Goal: *Control of Pain  Outcome: Progressing Towards Goal  Goal: *PALLIATIVE CARE:  Alleviation of Pain  Outcome: Progressing Towards Goal     Problem: Pressure Injury - Risk of  Goal: *Prevention of pressure injury  Description: Document Osiel Scale and appropriate interventions in the flowsheet.   Outcome: Progressing Towards Goal  Note: Pressure Injury Interventions:  Sensory Interventions: Minimize linen layers    Moisture Interventions: Minimize layers    Activity Interventions: Increase time out of bed    Mobility Interventions: HOB 30 degrees or less,Pressure redistribution bed/mattress (bed type)    Nutrition Interventions: Document food/fluid/supplement intake

## 2022-07-16 LAB — VANCOMYCIN SERPL-MCNC: 13 UG/ML (ref 5–40)

## 2022-07-16 PROCEDURE — 74011000250 HC RX REV CODE- 250: Performed by: INTERNAL MEDICINE

## 2022-07-16 PROCEDURE — 36415 COLL VENOUS BLD VENIPUNCTURE: CPT

## 2022-07-16 PROCEDURE — 80202 ASSAY OF VANCOMYCIN: CPT

## 2022-07-16 PROCEDURE — 74011250636 HC RX REV CODE- 250/636: Performed by: INTERNAL MEDICINE

## 2022-07-16 PROCEDURE — 74011250637 HC RX REV CODE- 250/637: Performed by: INTERNAL MEDICINE

## 2022-07-16 PROCEDURE — 99232 SBSQ HOSP IP/OBS MODERATE 35: CPT | Performed by: FAMILY MEDICINE

## 2022-07-16 PROCEDURE — 65270000046 HC RM TELEMETRY

## 2022-07-16 RX ORDER — VANCOMYCIN/0.9 % SOD CHLORIDE 1.5G/250ML
1500 PLASTIC BAG, INJECTION (ML) INTRAVENOUS EVERY 12 HOURS
Status: DISCONTINUED | OUTPATIENT
Start: 2022-07-16 | End: 2022-07-18

## 2022-07-16 RX ADMIN — SODIUM CHLORIDE, PRESERVATIVE FREE 10 ML: 5 INJECTION INTRAVENOUS at 00:55

## 2022-07-16 RX ADMIN — Medication 5 MG: at 00:51

## 2022-07-16 RX ADMIN — VANCOMYCIN HYDROCHLORIDE 1250 MG: 10 INJECTION, POWDER, LYOPHILIZED, FOR SOLUTION INTRAVENOUS at 08:00

## 2022-07-16 RX ADMIN — OXYCODONE HYDROCHLORIDE AND ACETAMINOPHEN 1 TABLET: 5; 325 TABLET ORAL at 18:43

## 2022-07-16 RX ADMIN — MORPHINE SULFATE 2 MG: 2 INJECTION, SOLUTION INTRAMUSCULAR; INTRAVENOUS at 12:52

## 2022-07-16 RX ADMIN — OXYCODONE HYDROCHLORIDE AND ACETAMINOPHEN 1 TABLET: 5; 325 TABLET ORAL at 14:20

## 2022-07-16 RX ADMIN — OXYCODONE HYDROCHLORIDE AND ACETAMINOPHEN 1 TABLET: 5; 325 TABLET ORAL at 04:38

## 2022-07-16 RX ADMIN — SODIUM CHLORIDE, PRESERVATIVE FREE 10 ML: 5 INJECTION INTRAVENOUS at 21:50

## 2022-07-16 RX ADMIN — SODIUM CHLORIDE, PRESERVATIVE FREE 10 ML: 5 INJECTION INTRAVENOUS at 17:32

## 2022-07-16 RX ADMIN — OXYCODONE HYDROCHLORIDE AND ACETAMINOPHEN 1 TABLET: 5; 325 TABLET ORAL at 22:51

## 2022-07-16 RX ADMIN — MORPHINE SULFATE 2 MG: 2 INJECTION, SOLUTION INTRAMUSCULAR; INTRAVENOUS at 00:52

## 2022-07-16 RX ADMIN — OXYCODONE HYDROCHLORIDE AND ACETAMINOPHEN 1 TABLET: 5; 325 TABLET ORAL at 08:45

## 2022-07-16 RX ADMIN — SODIUM CHLORIDE, PRESERVATIVE FREE 10 ML: 5 INJECTION INTRAVENOUS at 05:29

## 2022-07-16 RX ADMIN — Medication 5 MG: at 22:51

## 2022-07-16 RX ADMIN — VANCOMYCIN HYDROCHLORIDE 1500 MG: 10 INJECTION, POWDER, LYOPHILIZED, FOR SOLUTION INTRAVENOUS at 20:21

## 2022-07-16 NOTE — PROGRESS NOTES
Wound Prevention Checklist    Patient: Stephanie Gray  (30 y.o. male)  Date: 7/16/2022  Diagnosis: Septic joint ([address] [M00.9]  Osteomyelitis ([address] [M86.9]  IV drug user [F19.90] Osteomyelitis ([facility] [address]    Precautions:  (standard )       []  Heel prevention boots placed on patient    [x]  Patient turned q2h during shift    []  Lift team ordered    [x]  Patient on [place] bed/Specialty bed    [x]  Each Wound is documented during shift (Stage, Color, drainage, odor, measurements, and dressings)    [x]  Dual skin checks done at bedside during shift report with Bernie Gonzalez RN

## 2022-07-16 NOTE — PROGRESS NOTES
Discussed with ID  SI joint aspiration positive for staph. Plan  IV abx and follow course  If failed consider surgical options. Have discussed with IR possible percutaneous joint drainage/debridement.

## 2022-07-16 NOTE — PROGRESS NOTES
Problem: Pain  Goal: *Control of Pain  Outcome: Progressing Towards Goal     Problem: Pressure Injury - Risk of  Goal: *Prevention of pressure injury  Description: Document Osiel Scale and appropriate interventions in the flowsheet. Outcome: Progressing Towards Goal  Note: Pressure Injury Interventions:  Sensory Interventions: Minimize linen layers    Moisture Interventions: Minimize layers    Activity Interventions: Increase time out of bed    Mobility Interventions: HOB 30 degrees or less    Nutrition Interventions: Document food/fluid/supplement intake    Friction and Shear Interventions: Minimize layers                Problem: Falls - Risk of  Goal: *Absence of Falls  Description: Document Aparna Fall Risk and appropriate interventions in the flowsheet.   Outcome: Progressing Towards Goal  Note: Fall Risk Interventions:  Mobility Interventions: Bed/chair exit alarm,Patient to call before getting OOB         Medication Interventions: Bed/chair exit alarm,Patient to call before getting OOB,Teach patient to arise slowly    Elimination Interventions: Call light in reach,Bed/chair exit alarm    History of Falls Interventions: Bed/chair exit alarm,Door open when patient unattended,Investigate reason for fall         Problem: Patient Education: Go to Patient Education Activity  Goal: Patient/Family Education  Outcome: Progressing Towards Goal

## 2022-07-16 NOTE — PROGRESS NOTES
[facility]    Patient: Joel Becker. Age: 36 y.o. : 1990 MR#: [id number] SSN: [id number]  Date: 7/15/2022     Subjective/24-hour events:     Other than continued pain, no new complaints. Afebrile. Assessment:   Septic sacroiliac joint  Sacral osteomyelitis  IVDA - heroin  Tobacco use/active smoker    Plan:   Continue ABX per ID, follow cultures. Add scheduled NSAID for attempt at improved control of potential underlying inflammatory process. Analgesia as needed, bowel regimen. PT eval, mobilize as tolerated. Await disposition recs. Supportive care o/w. Follow. Case discussed with:  [x]Patient  []Family  []Nursing  [x]Case Management  DVT Prophylaxis:  []Lovenox  []Hep SQ  []SCDs  []Coumadin   []On Heparin gtt    Objective:   VS:   Visit Vitals  /66 (BP 1 Location: Right upper arm, BP Patient Position: At rest)   Pulse 75   Temp 98.2 °F (36.8 °C)   Resp 16   Ht 5' 11\" (1.803 m)   Wt 70.3 kg (155 lb)   SpO2 100%   BMI 21.62 kg/m²        General: Appears comfortable, in no acute distress. Cardiovascular:  RRR. Pulmonary:  Lungs clear bilaterally, no wheezes. No accessory muscle use. GI:  Abdomen soft, NTTP. Extremities:  Warm, no edema or ischemia. Neuro:  Awake and alert. Moves extremities spontaneously. Labs:    Results for Fritz Newell (MRN [id number]) as of 7/15/2022 11:01   Ref.  Range 7/15/2022 02:09   Sodium Latest Ref Range: 136 - 145 mmol/L 139   Potassium Latest Ref Range: 3.5 - 5.5 mmol/L 4.4   Chloride Latest Ref Range: 100 - 111 mmol/L 104   CO2 Latest Ref Range: 21 - 32 mmol/L 32   Anion gap Latest Ref Range: 3.0 - 18 mmol/L 3   Glucose Latest Ref Range: 74 - 99 mg/dL 95   BUN Latest Ref Range: 7.0 - 18 MG/DL 16   Creatinine Latest Ref Range: 0.6 - 1.3 MG/DL 0.62   BUN/Creatinine ratio Latest Ref Range: 12 - 20   26 (H)   Calcium Latest Ref Range: 8.5 - 10.1 MG/DL 9.2   GFR est non-AA Latest Ref Range: >60 ml/min/1.73m2 >60   GFR est AA Latest Ref Range: >60 ml/min/1.73m2 >60         Signed By: Cecy Post MD     July 15, 2022

## 2022-07-16 NOTE — PROGRESS NOTES
[facility]  Progress Note    Patient: Tin Bustillo Age: 36 y.o. : 1990 MR#: [id number] SSN: [id number]  Date: 2022     Subjective/24-hour events:     Pain tolerable, no new complaints. Remains afebrile. Cultures from SI joint aspirate growing MRSA. Assessment:   Septic sacroiliac joint  Sacral osteomyelitis  IVDA - heroin  Tobacco use/active smoker    Plan:   Vancomycin dosing per pharmacy. Final plan for antibiotics per ID recommendations. Analgesia and bowel regimen as ordered. Encouraged to mobilize as tolerated with rolling walker. Therapy recommendations appreciated. Continue supportive care otherwise. Will likely be hospitalized at least another couple of days until antibiotic treatment plan finalized. Case discussed with:  [x]Patient  []Family  [x]Nursing  []Case Management  DVT Prophylaxis:  []Lovenox  []Hep SQ  []SCDs  []Coumadin   []On Heparin gtt    Objective:   VS:   Visit Vitals  /60 (BP 1 Location: Right upper arm, BP Patient Position: At rest)   Pulse 62   Temp 98.1 °F (36.7 °C)   Resp 17   Ht 5' 11\" (1.803 m)   Wt 70.3 kg (155 lb)   SpO2 98%   BMI 21.62 kg/m²      Tmax/24hrs: Temp (24hrs), Av °F (36.7 °C), Min:97.6 °F (36.4 °C), Max:98.7 °F (37.1 °C)      Intake/Output Summary (Last 24 hours) at 2022 0819  Last data filed at 2022 0734  Gross per 24 hour   Intake 940 ml   Output 1850 ml   Net -910 ml       General: Appears comfortable, in no acute distress. Cardiovascular:  RRR. Pulmonary:  Lungs clear bilaterally, no wheezes. No accessory muscle use. GI:  Abdomen soft, NTTP. Extremities:  Warm, no edema or ischemia. Neuro:  Awake and alert. Moves extremities spontaneously.     Labs:    Recent Results (from the past 24 hour(s))   VANCOMYCIN, RANDOM    Collection Time: 22  4:24 AM   Result Value Ref Range    Vancomycin, random 13.0 5.0 - 40.0 UG/ML       Signed By: Zana Ochoa MD     , 2022

## 2022-07-16 NOTE — PROGRESS NOTES
[address] [facility] Pharmacokinetic Monitoring Service - Vancomycin    Consulting Provider: Dr Martínez Parents  Indication: Bone and Joint Infection  Target Concentration: Goal AUC/IVAN 400-600 mg*hr/L  Day of Therapy: Day 2 of Restart (Vancomycin on 7/12 & 7/13, no doses on 7/14)  Additional Antimicrobials: None    Pertinent Laboratory Values:   Temp: 98.1 °F (36.7 °C)  Weight: 70.3 kg (155 lb)  Recent Labs     07/15/22  0209 07/14/22  0313   CREA 0.62 0.70   BUN 16 23*   WBC 8.8 11.8       Estimated Creatinine Clearance: 154.8 mL/min (by C-G formula based on SCr of 0.62 mg/dL). Pertinent Cultures:  Culture Date Source Results   7/12 Blood NGTD x 4 days   7/13 Joint fluid Staph aureus   MRSA Nasal Swab: N/A.  Non-respiratory infection    Assessment:  Date/Time Current Dose Concentration Timing of Concentration (h) AUC   7/16 1250 mg Q12hr 12.6 7hr 45 min 460   Note: Serum concentrations collected for AUC dosing may appear elevated if collected in close proximity to the dose administered, this is not necessarily an indication of toxicity    Plan:  Current dosing regimen is sub-therapeutic  Increase dose to 1500 mg IV Q12hr ;  Estimated Tr = 15.4  AUC = 552  Renal labs as indicated   Vancomycin concentration ordered for  Monday, 7/18  @ 0400  Pharmacy will continue to monitor patient and adjust therapy as indicated    Thank you for the consult,  FELIX Hauser [facility]  7/16/2022

## 2022-07-16 NOTE — PROGRESS NOTES
Bedside shift change report given to SINDI Shaw (oncoming nurse) by Rocky Mayberry (offgoing nurse). Report included the following information SBAR, Kardex, MAR and Quality Measures.

## 2022-07-16 NOTE — PROGRESS NOTES
Problem: Pain  Goal: *Control of Pain  Outcome: Progressing Towards Goal     Problem: Pressure Injury - Risk of  Goal: *Prevention of pressure injury  Description: Document Osiel Scale and appropriate interventions in the flowsheet. Outcome: Progressing Towards Goal  Note: Pressure Injury Interventions:  Sensory Interventions: Assess changes in LOC    Moisture Interventions: Absorbent underpads    Activity Interventions: Increase time out of bed    Mobility Interventions: HOB 30 degrees or less    Nutrition Interventions: Document food/fluid/supplement intake    Friction and Shear Interventions: Apply protective barrier, creams and emollients                Problem: Falls - Risk of  Goal: *Absence of Falls  Description: Document Aparna Fall Risk and appropriate interventions in the flowsheet.   Outcome: Progressing Towards Goal  Note: Fall Risk Interventions:  Mobility Interventions: Bed/chair exit alarm         Medication Interventions: Bed/chair exit alarm    Elimination Interventions: Bed/chair exit alarm,Call light in reach    History of Falls Interventions: Door open when patient unattended,Bed/chair exit alarm

## 2022-07-17 LAB
ANION GAP SERPL CALC-SCNC: 4 MMOL/L (ref 3–18)
BACTERIA SPEC CULT: ABNORMAL
BACTERIA SPEC CULT: ABNORMAL
BUN SERPL-MCNC: 13 MG/DL (ref 7–18)
BUN/CREAT SERPL: 27 (ref 12–20)
CALCIUM SERPL-MCNC: 9 MG/DL (ref 8.5–10.1)
CHLORIDE SERPL-SCNC: 104 MMOL/L (ref 100–111)
CO2 SERPL-SCNC: 32 MMOL/L (ref 21–32)
CREAT SERPL-MCNC: 0.49 MG/DL (ref 0.6–1.3)
GLUCOSE SERPL-MCNC: 96 MG/DL (ref 74–99)
GRAM STN SPEC: ABNORMAL
GRAM STN SPEC: ABNORMAL
POTASSIUM SERPL-SCNC: 4.6 MMOL/L (ref 3.5–5.5)
SERVICE CMNT-IMP: ABNORMAL
SODIUM SERPL-SCNC: 140 MMOL/L (ref 136–145)

## 2022-07-17 PROCEDURE — 74011250636 HC RX REV CODE- 250/636: Performed by: INTERNAL MEDICINE

## 2022-07-17 PROCEDURE — 74011250637 HC RX REV CODE- 250/637: Performed by: INTERNAL MEDICINE

## 2022-07-17 PROCEDURE — 2709999900 HC NON-CHARGEABLE SUPPLY

## 2022-07-17 PROCEDURE — 97116 GAIT TRAINING THERAPY: CPT

## 2022-07-17 PROCEDURE — 65270000046 HC RM TELEMETRY

## 2022-07-17 PROCEDURE — 80048 BASIC METABOLIC PNL TOTAL CA: CPT

## 2022-07-17 PROCEDURE — 97530 THERAPEUTIC ACTIVITIES: CPT

## 2022-07-17 PROCEDURE — 99232 SBSQ HOSP IP/OBS MODERATE 35: CPT | Performed by: FAMILY MEDICINE

## 2022-07-17 PROCEDURE — 36415 COLL VENOUS BLD VENIPUNCTURE: CPT

## 2022-07-17 PROCEDURE — 74011000250 HC RX REV CODE- 250: Performed by: INTERNAL MEDICINE

## 2022-07-17 RX ADMIN — SODIUM CHLORIDE, PRESERVATIVE FREE 10 ML: 5 INJECTION INTRAVENOUS at 05:12

## 2022-07-17 RX ADMIN — Medication 5 MG: at 23:31

## 2022-07-17 RX ADMIN — OXYCODONE HYDROCHLORIDE AND ACETAMINOPHEN 1 TABLET: 5; 325 TABLET ORAL at 20:27

## 2022-07-17 RX ADMIN — OXYCODONE HYDROCHLORIDE AND ACETAMINOPHEN 1 TABLET: 5; 325 TABLET ORAL at 08:29

## 2022-07-17 RX ADMIN — SODIUM CHLORIDE, PRESERVATIVE FREE 10 ML: 5 INJECTION INTRAVENOUS at 15:40

## 2022-07-17 RX ADMIN — VANCOMYCIN HYDROCHLORIDE 1500 MG: 10 INJECTION, POWDER, LYOPHILIZED, FOR SOLUTION INTRAVENOUS at 20:34

## 2022-07-17 RX ADMIN — OXYCODONE HYDROCHLORIDE AND ACETAMINOPHEN 1 TABLET: 5; 325 TABLET ORAL at 04:55

## 2022-07-17 RX ADMIN — MORPHINE SULFATE 2 MG: 2 INJECTION, SOLUTION INTRAMUSCULAR; INTRAVENOUS at 12:14

## 2022-07-17 RX ADMIN — SODIUM CHLORIDE, PRESERVATIVE FREE 10 ML: 5 INJECTION INTRAVENOUS at 21:36

## 2022-07-17 RX ADMIN — MORPHINE SULFATE 2 MG: 2 INJECTION, SOLUTION INTRAMUSCULAR; INTRAVENOUS at 00:55

## 2022-07-17 RX ADMIN — VANCOMYCIN HYDROCHLORIDE 1500 MG: 10 INJECTION, POWDER, LYOPHILIZED, FOR SOLUTION INTRAVENOUS at 08:24

## 2022-07-17 RX ADMIN — OXYCODONE HYDROCHLORIDE AND ACETAMINOPHEN 1 TABLET: 5; 325 TABLET ORAL at 15:38

## 2022-07-17 RX ADMIN — MORPHINE SULFATE 2 MG: 2 INJECTION, SOLUTION INTRAMUSCULAR; INTRAVENOUS at 23:31

## 2022-07-17 NOTE — PROGRESS NOTES
conducted a Follow up consultation and Spiritual Assessment for Venancio Amaya, who is a 36 y.o.,male. The  provided the following Interventions:  Continued the relationship of care and support with this follow up visit. Engaged supportively as he shared about his physical progress and hopes for a soon discharge. Invited him to request a  if desired for emotional or spiritual support. Chart reviewed. The following outcomes were achieved:  Patient expressed gratitude for 's visit. Assessment:  Mr. Jaciel Rebollar expressed no current spiritual needs. Plan:  Chaplains will continue to follow and will provide pastoral care on an as needed/requested basis.  recommends bedside caregivers page  on duty if patient shows signs of acute spiritual or emotional distress.        [address] [place]   [phone]

## 2022-07-17 NOTE — PROGRESS NOTES
Problem: Pain  Goal: *Control of Pain  Outcome: Progressing Towards Goal     Problem: Pressure Injury - Risk of  Goal: *Prevention of pressure injury  Description: Document Osiel Scale and appropriate interventions in the flowsheet.   Outcome: Progressing Towards Goal  Note: Pressure Injury Interventions:  Sensory Interventions: Assess changes in LOC    Moisture Interventions: Absorbent underpads    Activity Interventions: Pressure redistribution bed/mattress(bed type)    Mobility Interventions: HOB 30 degrees or less    Nutrition Interventions: Document food/fluid/supplement intake    Friction and Shear Interventions: Apply protective barrier, creams and emollients                Problem: Patient Education: Go to Patient Education Activity  Goal: Patient/Family Education  Outcome: Progressing Towards Goal

## 2022-07-17 NOTE — ROUTINE PROCESS
Bedside shift change report given to SINDI Peguero (oncoming nurse) by Herminio Gallardo RN (offgoing nurse). Report included the following information SBAR, Kardex, Intake/Output and Recent Results.

## 2022-07-17 NOTE — PROGRESS NOTES
Problem: Pain  Goal: *Control of Pain  Outcome: Progressing Towards Goal  Goal: *PALLIATIVE CARE:  Alleviation of Pain  Outcome: Progressing Towards Goal     Problem: Pressure Injury - Risk of  Goal: *Prevention of pressure injury  Description: Document Osiel Scale and appropriate interventions in the flowsheet. Outcome: Progressing Towards Goal  Note: Pressure Injury Interventions:  Sensory Interventions: Assess changes in LOC,Check visual cues for pain    Moisture Interventions: Absorbent underpads    Activity Interventions: Increase time out of bed    Mobility Interventions: HOB 30 degrees or less,Pressure redistribution bed/mattress (bed type)    Nutrition Interventions: Document food/fluid/supplement intake    Friction and Shear Interventions: Minimize layers                Problem: Patient Education: Go to Patient Education Activity  Goal: Patient/Family Education  Outcome: Progressing Towards Goal     Problem: Falls - Risk of  Goal: *Absence of Falls  Description: Document Aparna Fall Risk and appropriate interventions in the flowsheet.   Outcome: Progressing Towards Goal  Note: Fall Risk Interventions:  Mobility Interventions: Assess mobility with egress test,Bed/chair exit alarm,Patient to call before getting OOB,Utilize walker, cane, or other assistive device         Medication Interventions: Bed/chair exit alarm,Patient to call before getting OOB    Elimination Interventions: Bed/chair exit alarm,Call light in reach,Patient to call for help with toileting needs,Urinal in reach    History of Falls Interventions: Door open when patient unattended         Problem: Patient Education: Go to Patient Education Activity  Goal: Patient/Family Education  Outcome: Progressing Towards Goal

## 2022-07-17 NOTE — PROGRESS NOTES
Problem: Mobility Impaired (Adult and Pediatric)  Goal: *Acute Goals and Plan of Care (Insert Text)  Description: Physical Therapy Goals  Initiated 7/15/2022 and to be accomplished within 7 day(s)  1. Patient will move from supine to sit and sit to supine , scoot up and down, and roll side to side in bed with modified independence. 2.  Patient will transfer from bed to chair and chair to bed with modified independence using the least restrictive device. 3.  Patient will perform sit to stand with modified independence. 4.  Patient will ambulate with modified independence for 75 feet with the least restrictive device. 5.  Patient will ascend/descend 5 stairs with unilateral handrail(s) with supervision/set-up. PLOF: Pt reporting independent prior to 1 month ago with increased R hip and R sided low back pain. Pt reports now needing a RW for mobility and stays mostly in the bed. Lives with family in 1 story house with 5 LULY. Outcome: Progressing Towards Goal   PHYSICAL THERAPY TREATMENT    Patient: Irish Bianchi (30 y.o. male)  Date: 7/17/2022  Diagnosis: Septic joint ([address] [M00.9]  Osteomyelitis ([address] [M86.9]  IV drug user [F19.90] Osteomyelitis ([facility] [address] [facility]    Precautions:  (standard )    ASSESSMENT:  Patient presented eager to perform exercises and gait this date. Patient was able to complete rolling to L side and able to push self to sitting with increased performance time and needed help towards the end to support B LE d/t severe pain reported in the past. Patient was able to tolerate stretching of R hamstrings and L ER muscles with additional muscle energy techniques to provide more relaxation to the muscle and complete ROM. Patient shows pain signs during stretching but not seen to be out of parameters of stretching activity. Patient was then able to complete sit to stand this date with supervision only and verbal cueing to lean forward to improve momentum of movement.  Patient reports that he is able to do activity easier this date compared to previous session. Patient then performed gait activities around room and needed assistance with IV tubing while walking around his bed. Patient was verbally cued to increased B LE stride length to improve forward progression and gait normalcy. Patient was left sitting on edge of bed as per patient request and call bed upon reach. Progression toward goals:   [x]      Improving appropriately and progressing toward goals  []      Improving slowly and progressing toward goals  []      Not making progress toward goals and plan of care will be adjusted     PLAN:  Patient continues to benefit from skilled intervention to address the above impairments. Continue treatment per established plan of care. Discharge Recommendations:  Home Health with increased family support  Further Equipment Recommendations for Discharge:  walker and N/A    AMPAC: Leah Sinks. scored a 15/20 (omitting stairs) on the AM-PAC short mobility form. SUBJECTIVE:   Patient stated great today.     OBJECTIVE DATA SUMMARY:   Critical Behavior:  Neurologic State: Alert  Orientation Level: Oriented X4  Cognition: Appropriate decision making  Safety/Judgement: Awareness of environment,Fall prevention  Functional Mobility Training:  Bed Mobility:  Rolling: Modified independent  Supine to Sit: Minimum assistance  Sit to Supine: Minimum assistance    Transfers:  Sit to Stand: Setup  Stand to Sit: Setup  Stand Pivot Transfers: Setup        Balance:  Sitting: Intact; With support  Standing: Impaired; With support  Standing - Static: Good  Standing - Dynamic : Fair     Ambulation/Gait Training:  Distance (ft): 30 Feet (ft)  Assistive Device: Walker  Ambulation - Level of Assistance: Contact guard assistance     Gait Description (WDL): Exceptions to WDL  Gait Abnormalities: Antalgic  Right Side Weight Bearing: As tolerated  Left Side Weight Bearing: Full  Base of Support: Narrowed     Speed/Amrita: Slow  Step Length: Right shortened  Swing Pattern: Right asymmetrical    Therapeutic Exercises:     EXERCISE   Sets   Reps   Active Active Assist   Passive Self ROM   Comments   Ankle Pumps    [] [] [] []    Quad Sets/Glut Sets    [] [x] [] [] Hold for 5 secs   Hamstring Sets   [] [x] [] []    Short Arc Quads   [] [x] [] []    Heel Slides   [] [] [] []    Straight Leg Raises   [] [] [] []    Hip Add   [] [] [] [] Hold for 5 secs, w/ pillow squeeze   Long Arc Quads   [] [] [] []    Seated Marching   [] [] [] []    Standing Marching   [] [] [] []       [] [] [] []      Activity Tolerance:   Please refer to the flowsheet for vital signs taken during this treatment. After treatment:   [] Patient left in no apparent distress sitting up in chair  [x] Patient left in no apparent distress in bed  [x] Call bell left within reach  [x] Nursing notified  [] Caregiver present  [] Bed alarm activated  [] SCDs applied      COMMUNICATION/EDUCATION:   [x]         Role of Physical Therapy in the acute care setting. [x]         Fall prevention education was provided and the patient/caregiver indicated understanding. [x]         Patient/family have participated as able in working toward goals and plan of care. []         Patient/family agree to work toward stated goals and plan of care. []         Patient understands intent and goals of therapy, but is neutral about his/her participation.   []         Patient is unable to participate in stated goals/plan of care: ongoing with therapy staff.  []         Other:        Grayson Mireles, PT   Time Calculation: 24 mins    [address] [place] [address] [address] [address] AM-PAC® Basic Mobility Inpatient Short Form (6-Clicks) Version 2    How much HELP from another person does the patient currently need    (If the patient hasn't done an activity recently, how much help from another person do you think he/she would need if he/she tried?)   Total (Total A or Dep)   A Lot  (Mod to Max A)   A Little (Sup or Min A)   None (Mod I to I)   Turning from your back to your side while in a flat bed without using bedrails? [] 1 [] 2 [x] 3 [] 4   2. Moving from lying on your back to sitting on the side of a flat bed without using bedrails? [] 1 [] 2 [x] 3 [] 4   3. Moving to and from a bed to a chair (including a wheelchair)? [] 1 [] 2 [x] 3 [] 4   4. Standing up from a chair using your arms (e.g., wheelchair, or bedside chair)? [] 1 [] 2 [x] 3 [] 4   5. Walking in hospital room? [] 1 [] 2 [x] 3 [] 4   6. Climbing 3-5 steps with a railing?+   [] 1 [] 2 [] 3 [] 4   +If stair climbing cannot be assessed, skip item #6. Sum responses from items 1-5. Current research shows that an AM-PAC score of 17 (13 if omitting stairs) or less is typically not associated with a discharge to the patient's home setting. Based on the patient's AM-PAC score and their current functional mobility deficits, it is recommended that the patient have 5-7 sessions per week of Physical Therapy at d/c to increase the patient's independence. At this time, this patient demonstrates the potential endurance, and/or tolerance for 3 hours of therapy each day at d/c. Please see assessment section for further patient specific details.

## 2022-07-17 NOTE — PROGRESS NOTES
[facility]  Progress Note    Patient: Ana Cole. Age: 36 y.o. : 1990 MR#: [id number] SSN: [id number]  Date: 2022     Subjective/24-hour events:     Has worked with therapy this morning. Ambulating with rolling walker. Continues to complain of some pain but remains afebrile. Denies nausea/vomiting. Assessment:   Septic sacroiliac joint  Sacral osteomyelitis  IVDA - heroin  Tobacco use/active smoker    Plan:   Continue vancomycin per ID recommendations. Dosing per pharmacy. Await ID follow-up tomorrow for further recommendations on antibiotic therapy going forward. Analgesia and bowel regimen. Mobilize as tolerated. Rolling walker recommended by therapy. HHC and DME orders entered today. Case discussed with:  [x]Patient  []Family  [x]Nursing  []Case Management  DVT Prophylaxis:  []Lovenox  []Hep SQ  []SCDs  []Coumadin   []On Heparin gtt    Objective:   VS:   Visit Vitals  /60 (BP 1 Location: Right upper arm, BP Patient Position: At rest)   Pulse 72   Temp 97.9 °F (36.6 °C)   Resp 18   Ht 5' 11\" (1.803 m)   Wt 70.3 kg (155 lb)   SpO2 100%   BMI 21.62 kg/m²      Tmax/24hrs: Temp (24hrs), Av.1 °F (36.7 °C), Min:97.3 °F (36.3 °C), Max:98.6 °F (37 °C)      Intake/Output Summary (Last 24 hours) at 2022 0838  Last data filed at 2022 0758  Gross per 24 hour   Intake 2340 ml   Output 1925 ml   Net 415 ml       General: Appears comfortable, in no acute distress. Cardiovascular:  RRR. Pulmonary:  Lungs clear bilaterally, no wheezes. GI:  Abdomen soft, NTTP. Extremities:  Warm, no edema or ischemia. Neuro:  Awake and alert. Moves extremities spontaneously.     Labs:    Recent Results (from the past 24 hour(s))   METABOLIC PANEL, BASIC    Collection Time: 22  1:09 AM   Result Value Ref Range    Sodium 140 136 - 145 mmol/L    Potassium 4.6 3.5 - 5.5 mmol/L    Chloride 104 100 - 111 mmol/L    CO2 32 21 - 32 mmol/L    Anion gap 4 3.0 - 18 mmol/L    Glucose 96 74 - 99 mg/dL    BUN 13 7.0 - 18 MG/DL    Creatinine 0.49 (L) 0.6 - 1.3 MG/DL    BUN/Creatinine ratio 27 (H) 12 - 20      GFR est AA >60 >60 ml/min/1.73m2    GFR est non-AA >60 >60 ml/min/1.73m2    Calcium 9.0 8.5 - 10.1 MG/DL       Signed By: Cecy Post MD     July 17, 2022

## 2022-07-17 NOTE — ROUTINE PROCESS
Wound Prevention Checklist    Patient: Santino Ramos  (30 y.o. male)  Date: 7/17/2022  Diagnosis: Septic joint ([address] [M00.9]  Osteomyelitis ([address] [M86.9]  IV drug user [F19.90] Osteomyelitis ([address]    Precautions:  (standard )       []  Heel prevention boots placed on patient    []  Patient turned q2h during shift    []  Lift team ordered    [x]  Patient on [place] bed/Specialty bed    []  Each Wound is documented during shift (Stage, Color, drainage, odor, measurements, and dressings)    [x]  Dual skin checks done at bedside during shift report with SINDI Peguero RN

## 2022-07-18 LAB
ANION GAP SERPL CALC-SCNC: 4 MMOL/L (ref 3–18)
BACTERIA SPEC CULT: NORMAL
BACTERIA SPEC CULT: NORMAL
BUN SERPL-MCNC: 15 MG/DL (ref 7–18)
BUN/CREAT SERPL: 28 (ref 12–20)
CALCIUM SERPL-MCNC: 9.4 MG/DL (ref 8.5–10.1)
CHLORIDE SERPL-SCNC: 104 MMOL/L (ref 100–111)
CO2 SERPL-SCNC: 30 MMOL/L (ref 21–32)
CREAT SERPL-MCNC: 0.54 MG/DL (ref 0.6–1.3)
GLUCOSE SERPL-MCNC: 97 MG/DL (ref 74–99)
POTASSIUM SERPL-SCNC: 4.1 MMOL/L (ref 3.5–5.5)
SERVICE CMNT-IMP: NORMAL
SERVICE CMNT-IMP: NORMAL
SODIUM SERPL-SCNC: 138 MMOL/L (ref 136–145)
VANCOMYCIN SERPL-MCNC: 18.8 UG/ML (ref 5–40)

## 2022-07-18 PROCEDURE — 80202 ASSAY OF VANCOMYCIN: CPT

## 2022-07-18 PROCEDURE — 74011250636 HC RX REV CODE- 250/636: Performed by: INTERNAL MEDICINE

## 2022-07-18 PROCEDURE — 74011250637 HC RX REV CODE- 250/637: Performed by: INTERNAL MEDICINE

## 2022-07-18 PROCEDURE — 97116 GAIT TRAINING THERAPY: CPT

## 2022-07-18 PROCEDURE — 99232 SBSQ HOSP IP/OBS MODERATE 35: CPT | Performed by: FAMILY MEDICINE

## 2022-07-18 PROCEDURE — 74011000250 HC RX REV CODE- 250: Performed by: INTERNAL MEDICINE

## 2022-07-18 PROCEDURE — 36415 COLL VENOUS BLD VENIPUNCTURE: CPT

## 2022-07-18 PROCEDURE — 80048 BASIC METABOLIC PNL TOTAL CA: CPT

## 2022-07-18 PROCEDURE — 65270000046 HC RM TELEMETRY

## 2022-07-18 RX ADMIN — MORPHINE SULFATE 2 MG: 2 INJECTION, SOLUTION INTRAMUSCULAR; INTRAVENOUS at 11:59

## 2022-07-18 RX ADMIN — WATER 2 G: 1 INJECTION INTRAMUSCULAR; INTRAVENOUS; SUBCUTANEOUS at 08:52

## 2022-07-18 RX ADMIN — SODIUM CHLORIDE, PRESERVATIVE FREE 10 ML: 5 INJECTION INTRAVENOUS at 22:28

## 2022-07-18 RX ADMIN — SODIUM CHLORIDE, PRESERVATIVE FREE 10 ML: 5 INJECTION INTRAVENOUS at 14:00

## 2022-07-18 RX ADMIN — OXYCODONE HYDROCHLORIDE AND ACETAMINOPHEN 1 TABLET: 5; 325 TABLET ORAL at 17:25

## 2022-07-18 RX ADMIN — ONDANSETRON 4 MG: 4 TABLET, ORALLY DISINTEGRATING ORAL at 17:35

## 2022-07-18 RX ADMIN — OXYCODONE HYDROCHLORIDE AND ACETAMINOPHEN 1 TABLET: 5; 325 TABLET ORAL at 03:18

## 2022-07-18 RX ADMIN — SODIUM CHLORIDE, PRESERVATIVE FREE 10 ML: 5 INJECTION INTRAVENOUS at 07:03

## 2022-07-18 RX ADMIN — OXYCODONE HYDROCHLORIDE AND ACETAMINOPHEN 1 TABLET: 5; 325 TABLET ORAL at 08:57

## 2022-07-18 RX ADMIN — WATER 2 G: 1 INJECTION INTRAMUSCULAR; INTRAVENOUS; SUBCUTANEOUS at 17:25

## 2022-07-18 NOTE — PROGRESS NOTES
Scheduled follow-up appointment with [facility], [address] [place] [address], [facility] for tomorrow (Tuesday, July 19, 2022) at 9:00 am patient has to be at the center by 8:30 am.

## 2022-07-18 NOTE — ROUTINE PROCESS
Wound Prevention Checklist    Patient: Keiko Sanchez  (30 y.o. male)  Date: 7/18/2022  Diagnosis: Septic joint ([address] [M00.9]  Osteomyelitis ([address] [M86.9]  IV drug user [F19.90] Osteomyelitis ([address]    Precautions:  (standard )       []  Heel prevention boots placed on patient    []  Patient turned q2h during shift    []  Lift team ordered    [x]  Patient on [place] bed/Specialty bed    []  Each Wound is documented during shift (Stage, Color, drainage, odor, measurements, and dressings)    [x]  Dual skin checks done at bedside during shift report with SINDI Peguero RN

## 2022-07-18 NOTE — PROGRESS NOTES
vss afeb  Neuro intact  Pain improved  Plan  Continue IV abx  Monitor clinical course  Re-consider further intervention if patient failing non operative treatment. Will sign off  Please re-consult if symptoms arise.

## 2022-07-18 NOTE — PROGRESS NOTES
Problem: Pressure Injury - Risk of  Goal: *Prevention of pressure injury  Description: Document Osiel Scale and appropriate interventions in the flowsheet. Outcome: Progressing Towards Goal  Note: Pressure Injury Interventions:  Sensory Interventions: Assess changes in LOC,Check visual cues for pain    Moisture Interventions: Absorbent underpads    Activity Interventions: Increase time out of bed,Pressure redistribution bed/mattress(bed type)    Mobility Interventions: HOB 30 degrees or less    Nutrition Interventions: Document food/fluid/supplement intake    Friction and Shear Interventions: Minimize layers                Problem: Patient Education: Go to Patient Education Activity  Goal: Patient/Family Education  Outcome: Progressing Towards Goal     Problem: Falls - Risk of  Goal: *Absence of Falls  Description: Document Aparna Fall Risk and appropriate interventions in the flowsheet.   Outcome: Progressing Towards Goal  Note: Fall Risk Interventions:  Mobility Interventions: Patient to call before getting OOB,Utilize walker, cane, or other assistive device,Assess mobility with egress test         Medication Interventions: Bed/chair exit alarm,Teach patient to arise slowly    Elimination Interventions: Bed/chair exit alarm,Call light in reach,Patient to call for help with toileting needs,Urinal in reach    History of Falls Interventions: Bed/chair exit alarm,Door open when patient unattended         Problem: Patient Education: Go to Patient Education Activity  Goal: Patient/Family Education  Outcome: Progressing Towards Goal

## 2022-07-18 NOTE — PROGRESS NOTES
Bedside  shift change report given to SINDI Donaldson (oncoming nurse) by Dami Dooley RN (offgoing nurse). Report included the following information SBAR, Kardex, MAR and Quality Measures.

## 2022-07-18 NOTE — PROGRESS NOTES
KARLIE spoke with Griselda Canton at [facility]. The patient will be reviewed tomorrow for schedule placement once completed ABX form is re faxed to [phone]. hospitals closes at 36    Notified Dr. Bobby Carrero.  will continue to monitor and assist with transition of care needs.     KENNY CamposN, RN  Care Management

## 2022-07-18 NOTE — PROGRESS NOTES
Problem: Mobility Impaired (Adult and Pediatric)  Goal: *Acute Goals and Plan of Care (Insert Text)  Description: Physical Therapy Goals  Initiated 7/15/2022 and to be accomplished within 7 day(s)  1. Patient will move from supine to sit and sit to supine , scoot up and down, and roll side to side in bed with modified independence. 2.  Patient will transfer from bed to chair and chair to bed with modified independence using the least restrictive device. 3.  Patient will perform sit to stand with modified independence. 4.  Patient will ambulate with modified independence for 75 feet with the least restrictive device. 5.  Patient will ascend/descend 5 stairs with unilateral handrail(s) with supervision/set-up. PLOF: Pt reporting independent prior to 1 month ago with increased R hip and R sided low back pain. Pt reports now needing a RW for mobility and stays mostly in the bed. Lives with family in 1 story house with 5 LULY. Outcome: Resolved/Met   PHYSICAL THERAPY TREATMENT    Patient: Joel Ivan (30 y.o. male)  Date: 7/18/2022  Diagnosis: Septic joint ([address] [M00.9]  Osteomyelitis ([address] [M86.9]  IV drug user [F19.90] Osteomyelitis ([address]       Precautions: Fall  ASSESSMENT:  Pt reclined in bed, NAD, and agreeable to PT. Pt requiring additional time for all mobility tasks due to pain, reported 5/10. Pt SBA for supine to sit using log roll technique with HOB elevated. STS x3 throughout session with CGA. Ambulated 40ftx4 with RW and SBA. Demonstrated alternating step to and step through gait pattern dependent on pain level throughout ambulation. Observed increased WB through arms with increased R hip pain. Pt returned to chair for seated exercises with good tolerance. Perform standing marches and toe taps to 2 stacked glove boxes with supervision and use of RW for BUE support to prepare for stair negotiation.  Educated on performing exercises every hour and increasing mobility, verbalized understanding. Educated on need for RN assistance for mobility in hallway but safe to ambulate in room with RW, verbalized understanding; informed RN on mobility. Pt left seated in chair, all needs met and call bell in reach. Progression toward goals:   [x]      Improving appropriately and progressing toward goals  []      Improving slowly and progressing toward goals  []      Not making progress toward goals and plan of care will be adjusted     PLAN:  Patient continues to benefit from skilled intervention to address the above impairments. Continue treatment per established plan of care. Discharge Recommendations:  Home Health (OP if MD recommends)  Further Equipment Recommendations for Discharge:  rolling walker    [facility] Basic Mobility Inpatient Short Form:  18/24   This [facility] score should be considered in conjunction with interdisciplinary team recommendations to determine the most appropriate discharge setting. Patient's social support, diagnosis, medical stability, and prior level of function should also be taken into consideration. AM-PAC score of 18/24 (14/20 with stairs omitted) or greater is typically associated with a discharge to the patient's home setting. Based on the patient's AM-PAC score and their current functional mobility deficits, it is recommended that the patient have 2-3 sessions per week of Physical Therapy at d/c to increase the patient's independence. Please see assessment section for further patient specific details. SUBJECTIVE:   Patient stated it's getting better everyday.     OBJECTIVE DATA SUMMARY:   Critical Behavior:  Neurologic State: Alert  Orientation Level: Oriented X4  Cognition: Follows commands     Psychosocial  Patient Behaviors: Calm; Cooperative  Purposeful Interaction: Yes  Pt Identified Daily Priority: Clinical issues (comment)  Caritas Process: Establish trust  Caring Interventions: Reassure; Therapeutic modalities  Reassure: Instilling keli and hope  Therapeutic Modalities: Intentional therapeutic touch  Functional Mobility:  Bed Mobility:  Rolling: Modified independent  Supine to Sit: Stand-by assistance; Additional time  Transfers:  Sit to Stand: Contact guard assistance  Stand to Sit: Stand-by assistance  Balance:   Sitting: Intact; With support  Standing: Impaired; With support  Standing - Static: Good  Standing - Dynamic : Fair  Ambulation/Gait Training:  Distance (ft):  (45ftx4)   Assistive Device: Walker, rolling  Ambulation - Level of Assistance: Stand-by assistance  Gait Abnormalities: Antalgic  Stairs:   Level of Assistance: Supervision  Assistive Device: walker to simulate bilateral handrails  Rail Use: both  Number of Stairs: 5 simulated by toe taps to 2 stacked glove boxes  Therapeutic Exercises:   Seated BLE:   LAQ x10  Ankle pumps x10  Hip AD x10  Seated piriformis stretch at EOB BLE: [id number]  Standing marches at RW x10    Pain:  Pain level pre-treatment: 5/10  Pain level post-treatment: 5/10     Activity Tolerance:   Good    After treatment:   [x] Patient left in no apparent distress sitting up in chair  [] Patient left in no apparent distress in bed  [x] Call bell left within reach  [x] Nursing notified  [] Caregiver present  [] Bed alarm activated  [] SCDs applied      COMMUNICATION/EDUCATION:   [x]         Role of physical therapy in the acute care setting. [x]         Fall prevention education was provided and the patient/caregiver indicated understanding. [x]         Patient/family have participated as able in working toward goals and plan of care. [x]         Patient/family agree to work toward stated goals and plan of care. []         Patient understands intent and goals of therapy, but is neutral about his/her participation. []         Patient is unable to participate in stated goals/plan of care: ongoing with therapy staff.       LEISA Ford   Time Calculation: 29 mins    [facility] Inpatient Short Form (6-Clicks) Version 2    How much HELP from another person does the patient currently need    (If the patient hasn't done an activity recently, how much help from another person do you think he/she would need if he/she tried?)   Total (Total A or Dep)   A Lot  (Mod to Max A)   A Little (Sup or Min A)   None (Mod I to I)   Turning from your back to your side while in a flat bed without using bedrails? [] 1 [] 2 [x] 3 [] 4   2. Moving from lying on your back to sitting on the side of a flat bed without using bedrails? [] 1 [] 2 [x] 3 [] 4   3. Moving to and from a bed to a chair (including a wheelchair)? [] 1 [] 2 [x] 3 [] 4   4. Standing up from a chair using your arms (e.g., wheelchair, or bedside chair)? [] 1 [] 2 [x] 3 [] 4   5. Walking in hospital room? [] 1 [] 2 [x] 3 [] 4   6. Climbing 3-5 steps with a railing?+   [] 1 [] 2 [x] 3 [] 4   +If stair climbing cannot be assessed, skip item #6. Sum responses from items 1-5.

## 2022-07-18 NOTE — PROGRESS NOTES
Problem: Pain  Goal: *Control of Pain  Outcome: Progressing Towards Goal     Problem: Pressure Injury - Risk of  Goal: *Prevention of pressure injury  Description: Document Osiel Scale and appropriate interventions in the flowsheet. Outcome: Progressing Towards Goal  Note: Pressure Injury Interventions:  Sensory Interventions: Assess changes in LOC    Moisture Interventions: Absorbent underpads    Activity Interventions: Increase time out of bed    Mobility Interventions: HOB 30 degrees or less    Nutrition Interventions: Document food/fluid/supplement intake    Friction and Shear Interventions: Apply protective barrier, creams and emollients                Problem: Falls - Risk of  Goal: *Absence of Falls  Description: Document Aparna Fall Risk and appropriate interventions in the flowsheet.   Outcome: Progressing Towards Goal  Note: Fall Risk Interventions:  Mobility Interventions: Bed/chair exit alarm         Medication Interventions: Bed/chair exit alarm    Elimination Interventions: Call light in reach,Bed/chair exit alarm    History of Falls Interventions: Bed/chair exit alarm,Door open when patient unattended

## 2022-07-18 NOTE — PROGRESS NOTES
[facility]  Progress Note    Patient: Wallace Campuzano. Age: 36 y.o. : 1990 MR#: [id number] SSN: [id number]  Date: 2022     Subjective/24-hour events:     Continues to mobilize in room with walker. Pain remains acceptable, no fevers reported overnight. Assessment:   MSSA SI joint infection  Concern for sacral osteomyelitis  IVDA - heroin  Tobacco use/active smoker    Plan:   Antibiotics going forward per ID recommendations. Attempting to arrange outpatient antibiotics at infusion center. Case has been discussed with care management as well as Dr. Toney Mares. Orders for IV antibiotics forwarded to care management. If patient unable to be set up with treatment at infusion center, will likely can continue with current IV antibiotic therapy through the end of this week with plan for discharge with long-term oral therapy. Continue analgesia as necessary, bowel regimen as ordered. .  Continue to mobilize as able/tolerated with rolling walker. DME and home health care services already ordered for discharge. Case discussed with:  [x]Patient  []Family  [x]Nursing  [x]Case Management  DVT Prophylaxis:  []Lovenox  []Hep SQ  []SCDs  []Coumadin   []On Heparin gtt    Objective:   VS:   Visit Vitals  /78   Pulse 82   Temp 97.4 °F (36.3 °C)   Resp 20   Ht 5' 11\" (1.803 m)   Wt 70.3 kg (155 lb)   SpO2 98%   BMI 21.62 kg/m²      Tmax/24hrs: Temp (24hrs), Av.2 °F (36.8 °C), Min:97.4 °F (36.3 °C), Max:98.6 °F (37 °C)      Intake/Output Summary (Last 24 hours) at 2022 1504  Last data filed at 2022 2330  Gross per 24 hour   Intake --   Output 700 ml   Net -700 ml       General: Appears comfortable, in no acute distress. Cardiovascular:  RRR. Pulmonary:  Lungs clear bilaterally, no wheezes. GI:  Abdomen soft, NTTP. Extremities:  Warm, no edema or ischemia. Neuro:  Awake and alert. Moves extremities spontaneously.     Labs:    Recent Results (from the past 24 hour(s))   METABOLIC PANEL, BASIC    Collection Time: 07/18/22  3:04 AM   Result Value Ref Range    Sodium 138 136 - 145 mmol/L    Potassium 4.1 3.5 - 5.5 mmol/L    Chloride 104 100 - 111 mmol/L    CO2 30 21 - 32 mmol/L    Anion gap 4 3.0 - 18 mmol/L    Glucose 97 74 - 99 mg/dL    BUN 15 7.0 - 18 MG/DL    Creatinine 0.54 (L) 0.6 - 1.3 MG/DL    BUN/Creatinine ratio 28 (H) 12 - 20      GFR est AA >60 >60 ml/min/1.73m2    GFR est non-AA >60 >60 ml/min/1.73m2    Calcium 9.4 8.5 - 10.1 MG/DL   VANCOMYCIN, RANDOM    Collection Time: 07/18/22  3:04 AM   Result Value Ref Range    Vancomycin, random 18.8 5.0 - 40.0 UG/ML       Signed By: Adam Lucero MD     July 18, 2022

## 2022-07-18 NOTE — PROGRESS NOTES
Infectious Disease progress Note        Reason:right SI joint septic arthritis    Current abx Prior abx   Cefepime, vancomycin since 7/12-7/13  Vancomycin started 7/15      Lines:       Assessment :  36 y.o. male with PMH significant of IVDA, chronic hepatitis C presented to [facility] ER on 7/12/22 with complaint of right buttock pain and Pain bearing weight on Right Leg. Clinical presentation likely suggestive of right SI joint septic arthritis. Recent history of IV drug abuse predisposes patient to indolent gram-positive/gram-negative infection. Also underlying immunocompromise state secondary to hepatitis C will mask the full clinical presentation. S/p IR guided SI joint aspiration 7/14/22. Cultures methicillin susceptible Staph aureus    Significant clinical improvement noted today. Improved right buttock/SI joint pain, tenderness    Recommendations:    1. Discontinue  Vancomycin. start cefazolin   2. F/u spine surgery recommendations  3. Ideally patient needs IV cefazolin till 8/29/2022. Unfortunately, I did not replace PICC line due to recent history of IV drug abuse. Also been unable to arrange for home IV antibiotics due to insurance issues. Under the circumstances, the second best option would be to arrange for full doses of IV dalbavancin 1 week apart at outpatient infusion center. If this is not feasible due to cost issues, recommend to keep patient in the hospital for few more days of IV antibiotic. We will switch to prolonged oral antibiotics if continued clinical improvement noted in future exam    Tentative orders for OPIC given to . Above plan was discussed in details with patient, dr. Terese Fisher, dr. Jovani Varner. All in agreement. Please call me if any further questions or concerns. Will continue to participate in the care of this patient. HPI:  Feels much better. Improved right buttock pain. Gwendolyn Abena to go home.         Past Medical History:   Diagnosis Date    ADHD (attention deficit hyperactivity disorder)     Asthma     [facility]     Facial injury     Hepatitis C     Heroin abuse ([facility] [facility]     treatment in [facility] 2016    Lyme disease        Past Surgical History:   Procedure Laterality Date    HX TYMPANOSTOMY      bilateral TM's as a child       Home meidcations Details   melatonin 5 mg tablet Take 5 mg by mouth nightly. acetaminophen (Tylenol Extra Strength) 500 mg tablet Take 500 mg by mouth every six (6) hours as needed for Pain. albuterol (PROVENTIL HFA, VENTOLIN HFA, PROAIR HFA) 90 mcg/actuation inhaler Take 2 Puffs by inhalation every six (6) hours as needed for Wheezing. Qty: 1 Inhaler, Refills: 0    Associated Diagnoses: Tobacco abuse disorder;  History of asthma             Current Facility-Administered Medications   Medication Dose Route Frequency    ceFAZolin (ANCEF) 2 g in sterile water (preservative free) 20 mL IV syringe  2 g IntraVENous Q8H    sodium chloride (NS) flush 5-40 mL  5-40 mL IntraVENous Q8H    sodium chloride (NS) flush 5-40 mL  5-40 mL IntraVENous PRN    polyethylene glycol (MIRALAX) packet 17 g  17 g Oral DAILY PRN    ondansetron (ZOFRAN ODT) tablet 4 mg  4 mg Oral Q8H PRN    Or    ondansetron (ZOFRAN) injection 4 mg  4 mg IntraVENous Q6H PRN    albuterol-ipratropium (DUO-NEB) 2.5 MG-0.5 MG/3 ML  3 mL Nebulization Q6H PRN    melatonin tablet 5 mg  5 mg Oral QHS PRN    nicotine (NICODERM CQ) 21 mg/24 hr patch 1 Patch  1 Patch TransDERmal DAILY PRN    oxyCODONE-acetaminophen (PERCOCET) 5-325 mg per tablet 1 Tablet  1 Tablet Oral Q4H PRN    morphine injection 2-4 mg  2-4 mg IntraVENous Q3H PRN    naloxone (NARCAN) injection 0.4 mg  0.4 mg IntraVENous EVERY 2 MINUTES AS NEEDED       Allergies: Tylenol [acetaminophen]    Family History   Problem Relation Age of Onset    Emphysema Mother     COPD Mother     No Known Problems Father     Attention Deficit Hyperactivity Disorder Brother     Diabetes Maternal Grandmother     Stroke Maternal Grandmother     Kidney Disease Maternal Grandfather     Liver Disease Paternal Grandfather         Cirrhosis caused death     Social History     Socioeconomic History    Marital status: SINGLE     Spouse name: Not on file    Number of children: 0    Years of education: 5    Highest education level: GED or equivalent   Occupational History    Occupation: Prep Cook    Tobacco Use    Smoking status: Current Every Day Smoker     Packs/day: 1.00     Years: 10.00     Pack years: 10.00    Smokeless tobacco: Former User   Substance and Sexual Activity    Alcohol use: Yes     Alcohol/week: 3.0 standard drinks     Types: 3 Cans of beer per week     Comment: Occasional    Drug use: Yes     Types: Marijuana    Sexual activity: Not Currently     Partners: Female   Other Topics Concern     Service No    Blood Transfusions No    Caffeine Concern No    Occupational Exposure Yes     Comment: Dust in [employer] Hazards Yes     Comment: riding horses    Sleep Concern Yes     Comment: \"trouble sleeping\"    Stress Concern Yes    Weight Concern No    Special Diet No    Back Care No    Exercise Yes     Comment: runs    Bike Helmet No    Seat Belt Yes    Self-Exams Yes   Social History Narrative    Not on file     Social Determinants of Health     Financial Resource Strain:     Difficulty of Paying Living Expenses: Not on file   Food Insecurity:     Worried About Running Out of Food in the Last Year: Not on file    Jenny of Food in the Last Year: Not on file   Transportation Needs:     Lack of Transportation (Medical): Not on file    Lack of Transportation (Non-Medical):  Not on file   Physical Activity:     Days of Exercise per Week: Not on file    Minutes of Exercise per Session: Not on file   Stress:     Feeling of Stress : Not on file   Social Connections:     Frequency of Communication with Friends and Family: Not on file    Frequency of Social Gatherings with Friends and Family: Not on file    Attends Episcopal Services: Not on file    Active Member of Clubs or Organizations: Not on file    Attends Club or Organization Meetings: Not on file    Marital Status: Not on file   Intimate Partner Violence:     Fear of Current or Ex-Partner: Not on file    Emotionally Abused: Not on file    Physically Abused: Not on file    Sexually Abused: Not on file   Housing Stability:     Unable to Pay for Housing in the Last Year: Not on file    Number of [place] in the Last Year: Not on file    Unstable Housing in the Last Year: Not on file     Social History     Tobacco Use   Smoking Status Current Every Day Smoker    Packs/day: 1.00    Years: 10.00    Pack years: 10.00   Smokeless Tobacco Former User        Temp (24hrs), Av.3 °F (36.8 °C), Min:97.9 °F (36.6 °C), Max:98.6 °F (37 °C)    Visit Vitals  /67 (BP 1 Location: Left upper arm, BP Patient Position: Supine; At rest)   Pulse 75   Temp 98.4 °F (36.9 °C)   Resp 16   Ht 5' 11\" (1.803 m)   Wt 70.3 kg (155 lb)   SpO2 99%   BMI 21.62 kg/m²       ROS: 12 point ROS obtained in details. Pertinent positives as mentioned in HPI,   otherwise negative    Physical Exam:    General: Well developed, well nourished male laying on the bed/sitting on the  bed AAOx3 in no acute distress. General:   awake alert and oriented   HEENT:  Normocephalic, atraumatic,  EOMI, no scleral icterus or pallor; no conjunctival hemmohage;  nasal and oral mucous are moist and without evidence of lesions. No thrush. Dentition poor. Neck supple, no bruits. Lymph Nodes:   not examined   Lungs:   non-labored, bilaterally clear to auscultation- no crackles wheezes rales or rhonchi   Heart:  RRR, s1 and s2; no rubs or gallops, no edema, + pedal pulses   Abdomen:  soft, non-distended, active bowel sounds, no hepatomegaly, no splenomegaly.  Non-tender   Genitourinary:  deferred   Extremities:   no clubbing, cyanosis; no joint effusions or swelling; resolved tenderness over right SI joint/right buttock- no erythema, decreased right buttock on movements of RLE; muscle mass appropriate for age   Neurologic:  No gross focal sensory abnormalities; 5/5 muscle strength to upper and lower extremities. Speech appropriate. Cranial nerves intact                        Skin:  No rash or ulcers noted   Back:  no spinal or paraspinal muscle tenderness or rigidity, no CVA tenderness     Psychiatric:  No suicidal or homicidal ideations, appropriate mood and affect         Labs: Results:   Chemistry Recent Labs     07/18/22  0304 07/17/22  0109   GLU 97 96    140   K 4.1 4.6    104   CO2 30 32   BUN 15 13   CREA 0.54* 0.49*   CA 9.4 9.0   AGAP 4 4   BUCR 28* 27*      CBC w/Diff No results for input(s): WBC, RBC, HGB, HCT, PLT, GRANS, LYMPH, EOS, HGBEXT, HCTEXT, PLTEXT, HGBEXT, HCTEXT, PLTEXT in the last 72 hours. Microbiology No results for input(s): CULT in the last 72 hours. RADIOLOGY:    All available imaging studies/reports in [facility] for this admission were reviewed    Total time spent >35 minutes. High complexity decision making was performed during the evaluation of this patient at high risk for decompensation    Above mentioned total time spent on reviewing the case/medical record/data/notes/EMR/patient examination/documentation/coordinating care with nurse/consultants, exclusive of procedures with complex decision making performed and > 50% time spent in face to face evaluation. Disclaimer: Sections of this note are dictated utilizing voice recognition software, which may have resulted in some phonetic based errors in grammar and contents. Even though attempts were made to correct all the mistakes, some may have been missed, and remained in the body of the document. If questions arise, please contact our department.     Dr. Tayler Waldrop, Infectious Disease Specialist  [phone]  July 18, 2022  8:56 AM

## 2022-07-19 LAB
ANION GAP SERPL CALC-SCNC: 4 MMOL/L (ref 3–18)
BUN SERPL-MCNC: 18 MG/DL (ref 7–18)
BUN/CREAT SERPL: 30 (ref 12–20)
CALCIUM SERPL-MCNC: 9.7 MG/DL (ref 8.5–10.1)
CHLORIDE SERPL-SCNC: 101 MMOL/L (ref 100–111)
CO2 SERPL-SCNC: 33 MMOL/L (ref 21–32)
CREAT SERPL-MCNC: 0.6 MG/DL (ref 0.6–1.3)
GLUCOSE SERPL-MCNC: 97 MG/DL (ref 74–99)
POTASSIUM SERPL-SCNC: 4.5 MMOL/L (ref 3.5–5.5)
SODIUM SERPL-SCNC: 138 MMOL/L (ref 136–145)

## 2022-07-19 PROCEDURE — 74011250636 HC RX REV CODE- 250/636: Performed by: INTERNAL MEDICINE

## 2022-07-19 PROCEDURE — 36415 COLL VENOUS BLD VENIPUNCTURE: CPT

## 2022-07-19 PROCEDURE — 74011000250 HC RX REV CODE- 250: Performed by: INTERNAL MEDICINE

## 2022-07-19 PROCEDURE — 97116 GAIT TRAINING THERAPY: CPT

## 2022-07-19 PROCEDURE — 80048 BASIC METABOLIC PNL TOTAL CA: CPT

## 2022-07-19 PROCEDURE — 97110 THERAPEUTIC EXERCISES: CPT

## 2022-07-19 PROCEDURE — 99232 SBSQ HOSP IP/OBS MODERATE 35: CPT | Performed by: FAMILY MEDICINE

## 2022-07-19 PROCEDURE — 65270000046 HC RM TELEMETRY

## 2022-07-19 PROCEDURE — 74011250637 HC RX REV CODE- 250/637: Performed by: INTERNAL MEDICINE

## 2022-07-19 RX ADMIN — MORPHINE SULFATE 2 MG: 2 INJECTION, SOLUTION INTRAMUSCULAR; INTRAVENOUS at 00:13

## 2022-07-19 RX ADMIN — WATER 2 G: 1 INJECTION INTRAMUSCULAR; INTRAVENOUS; SUBCUTANEOUS at 16:20

## 2022-07-19 RX ADMIN — Medication 5 MG: at 00:17

## 2022-07-19 RX ADMIN — SODIUM CHLORIDE, PRESERVATIVE FREE 10 ML: 5 INJECTION INTRAVENOUS at 21:03

## 2022-07-19 RX ADMIN — ONDANSETRON 4 MG: 2 INJECTION INTRAMUSCULAR; INTRAVENOUS at 16:20

## 2022-07-19 RX ADMIN — OXYCODONE HYDROCHLORIDE AND ACETAMINOPHEN 1 TABLET: 5; 325 TABLET ORAL at 08:46

## 2022-07-19 RX ADMIN — OXYCODONE HYDROCHLORIDE AND ACETAMINOPHEN 1 TABLET: 5; 325 TABLET ORAL at 18:55

## 2022-07-19 RX ADMIN — WATER 2 G: 1 INJECTION INTRAMUSCULAR; INTRAVENOUS; SUBCUTANEOUS at 08:46

## 2022-07-19 RX ADMIN — MORPHINE SULFATE 2 MG: 2 INJECTION, SOLUTION INTRAMUSCULAR; INTRAVENOUS at 12:09

## 2022-07-19 RX ADMIN — OXYCODONE HYDROCHLORIDE AND ACETAMINOPHEN 1 TABLET: 5; 325 TABLET ORAL at 13:29

## 2022-07-19 RX ADMIN — WATER 2 G: 1 INJECTION INTRAMUSCULAR; INTRAVENOUS; SUBCUTANEOUS at 00:13

## 2022-07-19 RX ADMIN — SODIUM CHLORIDE, PRESERVATIVE FREE 10 ML: 5 INJECTION INTRAVENOUS at 06:26

## 2022-07-19 RX ADMIN — SODIUM CHLORIDE, PRESERVATIVE FREE 10 ML: 5 INJECTION INTRAVENOUS at 16:21

## 2022-07-19 NOTE — PROGRESS NOTES
CM received  onctact  from Poppy Kohli with [facility]. Poppy Kohli from [facility] stated; observed Dr. Catherine Lopez note for pt to receive Dalbavancin 1st dose of 1500mg cost of 28,000 dollars and 3 doses of the same medication for 1000mg cost of 19,000 not including medical fees. Poppy Kohli from [facility] stated  pharmacy is  unable to fill this order. Pt does not have insurance at this time. Pt's Medicaid application is currently pending.      MARK Odom  Care Management

## 2022-07-19 NOTE — PROGRESS NOTES
Problem: Mobility Impaired (Adult and Pediatric)  Goal: *Acute Goals and Plan of Care (Insert Text)    Note: Initiated 7/15/2022 and to be accomplished within 7 day(s)  1. Patient will move from supine to sit and sit to supine , scoot up and down, and roll side to side in bed with modified independence. 2.  Patient will transfer from bed to chair and chair to bed with modified independence using the least restrictive device. 3.  Patient will perform sit to stand with modified independence. 4.  Patient will ambulate with modified independence for 75 feet with the least restrictive device. 5.  Patient will ascend/descend 5 stairs with unilateral handrail(s) with supervision/set-up. PLOF: Pt reporting independent prior to 1 month ago with increased R hip and R sided low back pain. Pt reports now needing a RW for mobility and stays mostly in the bed. Lives with family in 1 story house with 5 LULY. Outcome: Progressing Towards Goal  PHYSICAL THERAPY TREATMENT    Patient: Keiko Sanchez (30 y.o. male)  Date: 7/19/2022  Diagnosis: Septic joint ([address] [M00.9]  Osteomyelitis ([address] [M86.9]  IV drug user [F19.90] Osteomyelitis ([address]       Precautions: Fall    ASSESSMENT:  Pt supine in bed, NAD, and agreeable to PT session. Reports 4/10 pain and drastically significant improvements in mobility. Able to perform log roll mod I with HOB flat without use of handrails today. Given flowsheet of exercises / stretches and red theraband; good tolerance to all exercises given (see flowsheet below). Pt requiring additional time for exercises involving unilateral stance on RLE but is able to complete. Ambulated 150ft with RW and supervision with improved step through pattern compared to yesterday. Demonstrated less antalgic gait, decreased UE support for RLE stance, and increased gait speed. Returned to chair with supervision and positioned for comfort with all needs met and call bell in reach.  Educated on performing exercises hourly and increasing sets as tolerated, verbalized understanding. Plan to bring 4 in block to practice stairs next session. Educated on need for RN assistance with mobility in hallway; verbalized understanding. Progression toward goals:   [x]      Improving appropriately and progressing toward goals  []      Improving slowly and progressing toward goals  []      Not making progress toward goals and plan of care will be adjusted     PLAN:  Patient continues to benefit from skilled intervention to address the above impairments. Continue treatment per established plan of care. Discharge Recommendations:  Home Health  Further Equipment Recommendations for Discharge:  rolling walker    [facility] Basic Mobility Inpatient Short Form:  20/24   This [facility] score should be considered in conjunction with interdisciplinary team recommendations to determine the most appropriate discharge setting. Patient's social support, diagnosis, medical stability, and prior level of function should also be taken into consideration. AM-PAC score of 18/24 (14/20 with stairs omitted) or greater is typically associated with a discharge to the patient's home setting. Based on the patient's AM-PAC score and their current functional mobility deficits, it is recommended that the patient have 2-3 sessions per week of Physical Therapy at d/c to increase the patient's independence. Please see assessment section for further patient specific details. SUBJECTIVE:   Patient stated it's getting significantly better everyday.     OBJECTIVE DATA SUMMARY:   Critical Behavior:  Neurologic State: Alert  Orientation Level: Oriented X4  Cognition: Follows commands     Psychosocial  Patient Behaviors: Calm; Cooperative  Purposeful Interaction: Yes  Pt Identified Daily Priority: Clinical issues (comment)  [facility] Process: Teaching/learning;Establish trust  Caring Interventions: Reassure  Reassure: Informing;Caring rounds  Therapeutic Modalities: Intentional therapeutic touch  Functional Mobility:  Bed Mobility:  Rolling: Modified independent  Supine to Sit: Modified independent  Transfers:  Sit to Stand: Supervision  Stand to Sit: Supervision  Balance:   Sitting: Intact; With support  Standing: Impaired; With support  Standing - Static: Good  Standing - Dynamic : Good  Ambulation/Gait Training:  Distance (ft): 150 Feet (ft)   Assistive Device: Walker, rolling  Ambulation - Level of Assistance: Supervision  Gait Abnormalities: Antalgic  Stairs:   Level of Assistance: Supervision  Assistive Device: rolling walker simulate BUE railing  Rail Use: both simulated by RW  Number of Stairs: 5 simulated by toe taps to 2 stacked glove boxes  Therapeutic Exercises: Ankle pumps with red theraband: x10  Supine piriformis stretch: 3x30s  Seated HS stretch: x30s  Standing 4 way hip at RW: x10  Standing gastoc stretch: x30s    Pain:  Pain level pre-treatment: 4/10  Pain level post-treatment: 4/10     Activity Tolerance:   Good    After treatment:   [x] Patient left in no apparent distress sitting up in chair  [] Patient left in no apparent distress in bed  [x] Call bell left within reach  [x] Nursing notified  [] Caregiver present  [] Bed alarm activated  [] SCDs applied      COMMUNICATION/EDUCATION:   [x]         Role of physical therapy in the acute care setting. [x]         Fall prevention education was provided and the patient/caregiver indicated understanding. [x]         Patient/family have participated as able in working toward goals and plan of care. [x]         Patient/family agree to work toward stated goals and plan of care. []         Patient understands intent and goals of therapy, but is neutral about his/her participation. []         Patient is unable to participate in stated goals/plan of care: ongoing with therapy staff.       LEISA Montesinos   Time Calculation: 31 mins    [address] Mobility Inpatient Short Form (6-Clicks) Version 2    How much HELP from another person does the patient currently need    (If the patient hasn't done an activity recently, how much help from another person do you think he/she would need if he/she tried?)   Total (Total A or Dep)   A Lot  (Mod to Max A)   A Little (Sup or Min A)   None (Mod I to I)   Turning from your back to your side while in a flat bed without using bedrails? [] 1 [] 2 [] 3 [x] 4   2. Moving from lying on your back to sitting on the side of a flat bed without using bedrails? [] 1 [] 2 [] 3 [x] 4   3. Moving to and from a bed to a chair (including a wheelchair)? [] 1 [] 2 [x] 3 [] 4   4. Standing up from a chair using your arms (e.g., wheelchair, or bedside chair)? [] 1 [] 2 [x] 3 [] 4   5. Walking in hospital room? [] 1 [] 2 [x] 3 [] 4   6. Climbing 3-5 steps with a railing?+   [] 1 [] 2 [x] 3 [] 4   +If stair climbing cannot be assessed, skip item #6. Sum responses from items 1-5.

## 2022-07-19 NOTE — PROGRESS NOTES
[facility] Hospitalists  Progress Note    Patient: Luther Dozier Age: 36 y.o. : 1990 MR#: [id number] SSN: [id number]  Date: 2022     Subjective/24-hour events:     Pain in adequate control, no new issues overnight. Afebrile. Assessment:   MSSA SI joint infection  Concern for sacral osteomyelitis  IVDA - heroin  Tobacco use/active smoker    Plan:   Antibiotics per ID - remains on Ancef. Outpatient antibiotics at infusion center does not appear to be a viable option due to cost and patient's lack of insurance. Current plan is for patient to remain hospitalized for another 2 to 3 days for IV antibiotics with transition to prolonged oral therapy thereafter for completion on the outpatient basis. Continue to mobilize as tolerated. Home health care services and DME have already been ordered. Analgesia as necessary, continue bowel regimen. Supportive care otherwise. Follow. Case discussed with:  [x]Patient  []Family  [x]Nursing  [x]Case Management  DVT Prophylaxis:  []Lovenox  []Hep SQ  []SCDs  []Coumadin   []On Heparin gtt    Objective:   VS:   Visit Vitals  /84 (BP 1 Location: Right upper arm, BP Patient Position: Sitting)   Pulse (!) 104   Temp 97 °F (36.1 °C)   Resp 18   Ht 5' 11\" (1.803 m)   Wt 70.3 kg (155 lb)   SpO2 97%   BMI 21.62 kg/m²      Tmax/24hrs: Temp (24hrs), Av.4 °F (36.3 °C), Min:97 °F (36.1 °C), Max:97.8 °F (36.6 °C)      Intake/Output Summary (Last 24 hours) at 2022 1451  Last data filed at 2022 0659  Gross per 24 hour   Intake 480 ml   Output 1550 ml   Net -1070 ml       General: Appears comfortable, in no acute distress. Cardiovascular:  Regular, mildly tachy. Pulmonary:  Lungs clear bilaterally, no wheezes. GI:  Abdomen soft, NTTP. Extremities:  Warm, no edema or ischemia. Neuro:  Awake and alert. Moves extremities spontaneously.     Labs:    Recent Results (from the past 24 hour(s))   METABOLIC PANEL, BASIC Collection Time: 07/19/22  2:49 AM   Result Value Ref Range    Sodium 138 136 - 145 mmol/L    Potassium 4.5 3.5 - 5.5 mmol/L    Chloride 101 100 - 111 mmol/L    CO2 33 (H) 21 - 32 mmol/L    Anion gap 4 3.0 - 18 mmol/L    Glucose 97 74 - 99 mg/dL    BUN 18 7.0 - 18 MG/DL    Creatinine 0.60 0.6 - 1.3 MG/DL    BUN/Creatinine ratio 30 (H) 12 - 20      GFR est AA >60 >60 ml/min/1.73m2    GFR est non-AA >60 >60 ml/min/1.73m2    Calcium 9.7 8.5 - 10.1 MG/DL       Signed By: Desmond Rosenbaum MD     July 19, 2022

## 2022-07-19 NOTE — PROGRESS NOTES
Infectious Disease progress Note        Reason:right SI joint septic arthritis    Current abx Prior abx   Cefepime, vancomycin since 7/12-7/13  Vancomycin started 7/15-7/18  Cefazolin since 7/18      Lines:       Assessment :  36 y.o. male with PMH significant of IVDA, chronic hepatitis C presented to [facility] ER on 7/12/22 with complaint of right buttock pain and Pain bearing weight on Right Leg. Clinical presentation likely suggestive of right SI joint septic arthritis. Recent history of IV drug abuse predisposes patient to indolent gram-positive/gram-negative infection. Also underlying immunocompromise state secondary to hepatitis C will mask the full clinical presentation. S/p IR guided SI joint aspiration 7/14/22. Cultures methicillin susceptible Staph aureus    Significant clinical improvement noted today. Improved right buttock/SI joint pain, tenderness    Recommendations:    1.  continue cefazolin   2. F/u spine surgery recommendations  3. Ideally patient needs IV cefazolin till 8/29/2022. Unfortunately, I am uncomfortable to place PICC line due to recent history of IV drug abuse. Also been unable to arrange for home IV antibiotics due to insurance issues. Under the circumstances, the second best option would be to arrange for full doses of IV dalbavancin 1 week apart at outpatient infusion center (was informed that co-pays around $90,000). If this is not feasible due to cost issues, recommend to keep patient in the hospital for few more days of IV antibiotic. We will switch to prolonged oral antibiotics if continued clinical improvement noted in future exam    Tentative orders for OPIC given to . Above plan was discussed in details with patient, dr. Felicia Patel. All in agreement. Please call me if any further questions or concerns. Will continue to participate in the care of this patient. HPI:  Feels much better. Improved right buttock pain. Donzeloleg Gregory to go home.         Past Medical History:   Diagnosis Date    ADHD (attention deficit hyperactivity disorder)     Asthma     [facility]     Facial injury     Hepatitis C     Heroin abuse ([facility] [address]     treatment in [facility] 2016    Lyme disease        Past Surgical History:   Procedure Laterality Date    HX TYMPANOSTOMY      bilateral TM's as a child       Home meidcations Details   melatonin 5 mg tablet Take 5 mg by mouth nightly. acetaminophen (Tylenol Extra Strength) 500 mg tablet Take 500 mg by mouth every six (6) hours as needed for Pain. albuterol (PROVENTIL HFA, VENTOLIN HFA, PROAIR HFA) 90 mcg/actuation inhaler Take 2 Puffs by inhalation every six (6) hours as needed for Wheezing. Qty: 1 Inhaler, Refills: 0    Associated Diagnoses: Tobacco abuse disorder;  History of asthma             Current Facility-Administered Medications   Medication Dose Route Frequency    ceFAZolin (ANCEF) 2 g in sterile water (preservative free) 20 mL IV syringe  2 g IntraVENous Q8H    sodium chloride (NS) flush 5-40 mL  5-40 mL IntraVENous Q8H    sodium chloride (NS) flush 5-40 mL  5-40 mL IntraVENous PRN    polyethylene glycol (MIRALAX) packet 17 g  17 g Oral DAILY PRN    ondansetron (ZOFRAN ODT) tablet 4 mg  4 mg Oral Q8H PRN    Or    ondansetron (ZOFRAN) injection 4 mg  4 mg IntraVENous Q6H PRN    albuterol-ipratropium (DUO-NEB) 2.5 MG-0.5 MG/3 ML  3 mL Nebulization Q6H PRN    melatonin tablet 5 mg  5 mg Oral QHS PRN    nicotine (NICODERM CQ) 21 mg/24 hr patch 1 Patch  1 Patch TransDERmal DAILY PRN    oxyCODONE-acetaminophen (PERCOCET) 5-325 mg per tablet 1 Tablet  1 Tablet Oral Q4H PRN    morphine injection 2-4 mg  2-4 mg IntraVENous Q3H PRN    naloxone (NARCAN) injection 0.4 mg  0.4 mg IntraVENous EVERY 2 MINUTES AS NEEDED       Allergies: Tylenol [acetaminophen]    Family History   Problem Relation Age of Onset    Emphysema Mother     COPD Mother     No Known Problems Father     Attention Deficit Hyperactivity Disorder Brother     Diabetes Maternal Grandmother     Stroke Maternal Grandmother     Kidney Disease Maternal Grandfather     Liver Disease Paternal Grandfather         Cirrhosis caused death     Social History     Socioeconomic History    Marital status: SINGLE     Spouse name: Not on file    Number of children: 0    Years of education: 5    Highest education level: GED or equivalent   Occupational History    Occupation: Prep Cook    Tobacco Use    Smoking status: Current Every Day Smoker     Packs/day: 1.00     Years: 10.00     Pack years: 10.00    Smokeless tobacco: Former User   Substance and Sexual Activity    Alcohol use: Yes     Alcohol/week: 3.0 standard drinks     Types: 3 Cans of beer per week     Comment: Occasional    Drug use: Yes     Types: Marijuana    Sexual activity: Not Currently     Partners: Female   Other Topics Concern     Service No    Blood Transfusions No    Caffeine Concern No    Occupational Exposure Yes     Comment: Dust in [employer] Hazards Yes     Comment: riding horses    Sleep Concern Yes     Comment: \"trouble sleeping\"    Stress Concern Yes    Weight Concern No    Special Diet No    Back Care No    Exercise Yes     Comment: runs    Bike Helmet No    Seat Belt Yes    Self-Exams Yes   Social History Narrative    Not on file     Social Determinants of Health     Financial Resource Strain:     Difficulty of Paying Living Expenses: Not on file   Food Insecurity:     Worried About Running Out of Food in the Last Year: Not on file    Jenny of Food in the Last Year: Not on file   Transportation Needs:     Lack of Transportation (Medical): Not on file    Lack of Transportation (Non-Medical):  Not on file   Physical Activity:     Days of Exercise per Week: Not on file    Minutes of Exercise per Session: Not on file   Stress:     Feeling of Stress : Not on file   Social Connections:     Frequency of Communication with Friends and Family: Not on file  Frequency of Social Gatherings with Friends and Family: Not on file    Attends [employer] Services: Not on file    Active Member of Clubs or Organizations: Not on file    Attends Club or Organization Meetings: Not on file    Marital Status: Not on file   Intimate Partner Violence:     Fear of Current or Ex-Partner: Not on file    Emotionally Abused: Not on file    Physically Abused: Not on file    Sexually Abused: Not on file   Housing Stability:     Unable to Pay for Housing in the Last Year: Not on file    Number of [place] in the Last Year: Not on file    Unstable Housing in the Last Year: Not on file     Social History     Tobacco Use   Smoking Status Current Every Day Smoker    Packs/day: 1.00    Years: 10.00    Pack years: 10.00   Smokeless Tobacco Former User        Temp (24hrs), Av.6 °F (36.4 °C), Min:97.1 °F (36.2 °C), Max:98.2 °F (36.8 °C)    Visit Vitals  /65 (BP 1 Location: Right upper arm, BP Patient Position: Lying)   Pulse 84   Temp 97.6 °F (36.4 °C)   Resp 18   Ht 5' 11\" (1.803 m)   Wt 70.3 kg (155 lb)   SpO2 98%   BMI 21.62 kg/m²       ROS: 12 point ROS obtained in details. Pertinent positives as mentioned in HPI,   otherwise negative    Physical Exam:    General: Well developed, well nourished male laying on the bed/sitting on the  bed AAOx3 in no acute distress. General:   awake alert and oriented   HEENT:  Normocephalic, atraumatic,  EOMI, no scleral icterus or pallor; no conjunctival hemmohage;  nasal and oral mucous are moist and without evidence of lesions. No thrush. Dentition poor. Neck supple, no bruits. Lymph Nodes:   not examined   Lungs:   non-labored, bilaterally clear to auscultation- no crackles wheezes rales or rhonchi   Heart:  RRR, s1 and s2; no rubs or gallops, no edema, + pedal pulses   Abdomen:  soft, non-distended, active bowel sounds, no hepatomegaly, no splenomegaly.  Non-tender   Genitourinary:  deferred   Extremities:   no clubbing, cyanosis; no joint effusions or swelling; resolved tenderness over right SI joint/right buttock- no erythema, decreased right buttock on movements of RLE; muscle mass appropriate for age   Neurologic:  No gross focal sensory abnormalities; 5/5 muscle strength to upper and lower extremities. Speech appropriate. Cranial nerves intact                        Skin:  No rash or ulcers noted   Back:  no spinal or paraspinal muscle tenderness or rigidity, no CVA tenderness     Psychiatric:  No suicidal or homicidal ideations, appropriate mood and affect         Labs: Results:   Chemistry Recent Labs     07/19/22  0249 07/18/22  0304 07/17/22  0109   GLU 97 97 96    138 140   K 4.5 4.1 4.6    104 104   CO2 33* 30 32   BUN 18 15 13   CREA 0.60 0.54* 0.49*   CA 9.7 9.4 9.0   AGAP 4 4 4   BUCR 30* 28* 27*      CBC w/Diff No results for input(s): WBC, RBC, HGB, HCT, PLT, GRANS, LYMPH, EOS, HGBEXT, HCTEXT, PLTEXT, HGBEXT, HCTEXT, PLTEXT in the last 72 hours. Microbiology No results for input(s): CULT in the last 72 hours. RADIOLOGY:    All available imaging studies/reports in [facility] care for this admission were reviewed      Disclaimer: Sections of this note are dictated utilizing voice recognition software, which may have resulted in some phonetic based errors in grammar and contents. Even though attempts were made to correct all the mistakes, some may have been missed, and remained in the body of the document. If questions arise, please contact our department.     Dr. Giovanni Hall, Infectious Disease Specialist  [phone]  July 19, 2022  8:56 AM

## 2022-07-19 NOTE — PROGRESS NOTES
Wound Prevention Checklist    Patient: Sydney Gonzalez  (30 y.o. male)  Date: 7/19/2022  Diagnosis: Septic joint ([address] [M00.9]  Osteomyelitis ([address] [M86.9]  IV drug user [F19.90] Osteomyelitis ([address]    Precautions: Fall       []  Heel prevention boots placed on patient    []  Patient turned q2h during shift    []  Lift team ordered    [x]  Patient on [place] bed/Specialty bed    []  Each Wound is documented during shift (Stage, Color, drainage, odor, measurements, and dressings)    [x]  Dual skin checks done at bedside during shift report with Monet Morrow RN

## 2022-07-19 NOTE — PROGRESS NOTES
Problem: Pain  Goal: *Control of Pain  Outcome: Progressing Towards Goal     Problem: Pressure Injury - Risk of  Goal: *Prevention of pressure injury  Description: Document Osiel Scale and appropriate interventions in the flowsheet. Outcome: Progressing Towards Goal  Note: Pressure Injury Interventions:  Sensory Interventions: Assess changes in LOC,Minimize linen layers    Moisture Interventions: Absorbent underpads    Activity Interventions: Increase time out of bed    Mobility Interventions: HOB 30 degrees or less    Nutrition Interventions: Document food/fluid/supplement intake    Friction and Shear Interventions: Apply protective barrier, creams and emollients                Problem: Falls - Risk of  Goal: *Absence of Falls  Description: Document Aparna Fall Risk and appropriate interventions in the flowsheet.   Outcome: Progressing Towards Goal  Note: Fall Risk Interventions:  Mobility Interventions: Bed/chair exit alarm,Utilize walker, cane, or other assistive device         Medication Interventions: Bed/chair exit alarm    Elimination Interventions: Call light in reach    History of Falls Interventions: Bed/chair exit alarm

## 2022-07-20 ENCOUNTER — HOME HEALTH ADMISSION (OUTPATIENT)
Dept: HOME HEALTH SERVICES | Facility: HOME HEALTH | Age: 32
End: 2022-07-20

## 2022-07-20 PROCEDURE — 65270000046 HC RM TELEMETRY

## 2022-07-20 PROCEDURE — 74011250636 HC RX REV CODE- 250/636: Performed by: INTERNAL MEDICINE

## 2022-07-20 PROCEDURE — 97110 THERAPEUTIC EXERCISES: CPT

## 2022-07-20 PROCEDURE — 97116 GAIT TRAINING THERAPY: CPT

## 2022-07-20 PROCEDURE — 74011000250 HC RX REV CODE- 250: Performed by: INTERNAL MEDICINE

## 2022-07-20 PROCEDURE — 99232 SBSQ HOSP IP/OBS MODERATE 35: CPT | Performed by: FAMILY MEDICINE

## 2022-07-20 PROCEDURE — 74011250637 HC RX REV CODE- 250/637: Performed by: INTERNAL MEDICINE

## 2022-07-20 PROCEDURE — 2709999900 HC NON-CHARGEABLE SUPPLY

## 2022-07-20 RX ORDER — CEPHALEXIN 500 MG/1
500 CAPSULE ORAL 4 TIMES DAILY
Qty: 112 CAPSULE | Refills: 1 | Status: SHIPPED | OUTPATIENT
Start: 2022-07-20 | End: 2022-08-17

## 2022-07-20 RX ADMIN — WATER 2 G: 1 INJECTION INTRAMUSCULAR; INTRAVENOUS; SUBCUTANEOUS at 08:37

## 2022-07-20 RX ADMIN — Medication 5 MG: at 00:30

## 2022-07-20 RX ADMIN — ONDANSETRON 4 MG: 4 TABLET, ORALLY DISINTEGRATING ORAL at 17:46

## 2022-07-20 RX ADMIN — SODIUM CHLORIDE, PRESERVATIVE FREE 10 ML: 5 INJECTION INTRAVENOUS at 21:51

## 2022-07-20 RX ADMIN — OXYCODONE HYDROCHLORIDE AND ACETAMINOPHEN 1 TABLET: 5; 325 TABLET ORAL at 20:28

## 2022-07-20 RX ADMIN — WATER 2 G: 1 INJECTION INTRAMUSCULAR; INTRAVENOUS; SUBCUTANEOUS at 00:30

## 2022-07-20 RX ADMIN — ONDANSETRON 4 MG: 4 TABLET, ORALLY DISINTEGRATING ORAL at 00:36

## 2022-07-20 RX ADMIN — OXYCODONE HYDROCHLORIDE AND ACETAMINOPHEN 1 TABLET: 5; 325 TABLET ORAL at 14:31

## 2022-07-20 RX ADMIN — SODIUM CHLORIDE, PRESERVATIVE FREE 10 ML: 5 INJECTION INTRAVENOUS at 08:42

## 2022-07-20 RX ADMIN — SODIUM CHLORIDE, PRESERVATIVE FREE 10 ML: 5 INJECTION INTRAVENOUS at 16:25

## 2022-07-20 RX ADMIN — OXYCODONE HYDROCHLORIDE AND ACETAMINOPHEN 1 TABLET: 5; 325 TABLET ORAL at 03:15

## 2022-07-20 RX ADMIN — ONDANSETRON 4 MG: 4 TABLET, ORALLY DISINTEGRATING ORAL at 09:06

## 2022-07-20 RX ADMIN — MORPHINE SULFATE 2 MG: 2 INJECTION, SOLUTION INTRAMUSCULAR; INTRAVENOUS at 00:30

## 2022-07-20 RX ADMIN — OXYCODONE HYDROCHLORIDE AND ACETAMINOPHEN 1 TABLET: 5; 325 TABLET ORAL at 08:36

## 2022-07-20 RX ADMIN — WATER 2 G: 1 INJECTION INTRAMUSCULAR; INTRAVENOUS; SUBCUTANEOUS at 16:20

## 2022-07-20 NOTE — PROGRESS NOTES
Wound Prevention Checklist    Patient: Luther Dozier  (30 y.o. male)  Date: 7/20/2022  Diagnosis: Septic joint ([address] [M00.9]  Osteomyelitis ([address] [M86.9]  IV drug user [F19.90] Osteomyelitis ([facility] [address]    Precautions: Fall       []  Heel prevention boots placed on patient    []  Patient turned q2h during shift    []  Lift team ordered    [x]  Patient on [place] bed/Specialty bed    []  Each Wound is documented during shift (Stage, Color, drainage, odor, measurements, and dressings)    [x]  Dual skin checks done at bedside during shift report with Gonzalo Mack RN

## 2022-07-20 NOTE — PROGRESS NOTES
Comprehensive Nutrition Assessment    Type and Reason for Visit: Initial, RD nutrition re-screen/LOS    Nutrition Recommendations/Plan:   Monitor PO intake, compliance with oral supplement, labs, weight and plan of care during admission. Malnutrition Assessment:  Malnutrition Status:  No malnutrition (07/20/22 1604)      Nutrition History and Allergies:   Past medical history: ADHD, Asthma, bipolar disorder, hepatitis C, heroin abuse, lyme disease. NKFA. Weight history per chart review:  CBW: 07/12/22 : 70.3 kg (155 lb). Weight stable x 1 year. Nutrition Assessment:    Pt continues to tolerate current diet with % PO intake at most meals. No reports of d/n/v. Current labs WNL. Pt admitted for Back and Pain bearing weight on Right Leg- MSSA SI joint infection and in remission of IV Heroin Use for 2.5-3 months per chart review. Nutrition Related Findings:    Last BM 7/19. Output: 950mL (urine). Pertinent Medications:  Zofran, miralax, Wound Type: None    Current Nutrition Intake & Therapies:  Average Meal Intake: %  Average Supplement Intake: None ordered  ADULT DIET Regular    Anthropometric Measures:  Height: 5' 11\" (180.3 cm)  Ideal Body Weight (IBW): 172 lbs (78 kg)     Current Body Wt:  70.3 kg (154 lb 15.7 oz), 90.1 % IBW. Not specified  Current BMI (kg/m2): 21.6  BMI Category: Normal weight (BMI 18.5-24. 9)    Estimated Daily Nutrient Needs:  Energy Requirements Based On: Formula (MSJ 1.2-1.3)  Weight Used for Energy Requirements: Current  Energy (kcal/day): 2025-2193  Weight Used for Protein Requirements: Current (0.8-1.0)  Protein (g/day): 56-70  Method Used for Fluid Requirements: 1 ml/kcal  Fluid (ml/day): 2025-2193    Nutrition Diagnosis:   No nutrition diagnosis at this time related to   as evidenced by      Nutrition Interventions:   Food and/or Nutrient Delivery: Continue current diet  Nutrition Education/Counseling: Education not indicated, No recommendations at this time  Coordination of Nutrition Care: Continue to monitor while inpatient       Goals:     Goals: Meet at least 75% of estimated needs, by next RD assessment       Nutrition Monitoring and Evaluation:   Behavioral-Environmental Outcomes: None identified  Food/Nutrient Intake Outcomes: Food and nutrient intake  Physical Signs/Symptoms Outcomes: Meal time behavior, Nutrition focused physical findings    Discharge Planning:    Continue current diet    Tracie Ramsey MA, RDN, LD   Contact: [phone]

## 2022-07-20 NOTE — PROGRESS NOTES
[facility]  [facility]    Patient: Luis Jo. Age: 36 y.o. : 1990 MR#: [id number] SSN: [id number]  Date: 2022     Subjective/24-hour events:     Continues to feel okay overall. Pain has continued to improve, remains afebrile no nausea vomiting or diarrhea reported. Assessment:   MSSA SI joint infection  Concern for sacral osteomyelitis  IVDA - heroin  Tobacco use/active smoker    Plan:   Continue IV Ancef per ID recommendations. Plan is for transition to oral therapy tomorrow if patient continues to remain stable. We will discuss with care management whether medication will be covered as patient is uninsured. PT/OT as tolerated. [facility]  services and DME have already been ordered. Analgesia and bowel regimen to continue as ordered. Anticipate discharge home tomorrow if stable. Follow. Case discussed with:  [x]Patient  []Family  [x]Nursing  [x]Case Management  DVT Prophylaxis:  []Lovenox  []Hep SQ  []SCDs  []Coumadin   []On Heparin gtt    Objective:   VS:   Visit Vitals  /65   Pulse 87   Temp 98.1 °F (36.7 °C)   Resp 20   Ht 5' 11\" (1.803 m)   Wt 70.3 kg (155 lb)   SpO2 100%   BMI 21.62 kg/m²      Tmax/24hrs: Temp (24hrs), Av.1 °F (36.7 °C), Min:97.8 °F (36.6 °C), Max:98.3 °F (36.8 °C)      Intake/Output Summary (Last 24 hours) at 2022 1231  Last data filed at 2022 1822  Gross per 24 hour   Intake 480 ml   Output --   Net 480 ml       General: Appears comfortable, in no acute distress. Cardiovascular:  RRR. Pulmonary:  Lungs clear bilaterally, no wheezes. GI:  Abdomen soft, NTTP. Extremities:  Warm, no edema or ischemia. Neuro:  Awake and alert. Moves extremities spontaneously. Labs:    No results found for this or any previous visit (from the past 24 hour(s)).       Signed By: Karley Crespo MD     2022

## 2022-07-20 NOTE — ROUTINE PROCESS
Wound Prevention Checklist    Patient: Jayla Salazar  (30 y.o. male)  Date: 7/20/2022  Diagnosis: Septic joint ([address] [M00.9]  Osteomyelitis ([address] [M86.9]  IV drug user [F19.90] Osteomyelitis ([address]    Precautions: Fall       []  Heel prevention boots placed on patient    [x]  Patient turned q2h during shift    []  Lift team ordered    [x]  Patient on [place] bed/Specialty bed    [x]  Each Wound is documented during shift (Stage, Color, drainage, odor, measurements, and dressings)    [x]  Dual skin checks done at bedside during shift report with José Mcdaniels LPN

## 2022-07-20 NOTE — PROGRESS NOTES
CM faxed Indigent Medication Form and all required documentation to [facility] for Cephalexin (Keflex)- 500 mg. Take one capsule by mouth four times a day for 28 days.     Dispense :112 capsule     Refill :1        MARK Serra  Care Manager

## 2022-07-20 NOTE — PROGRESS NOTES
Problem: Mobility Impaired (Adult and Pediatric)  Goal: *Acute Goals and Plan of Care (Insert Text)  Description: 1. Patient will move from supine to sit and sit to supine , scoot up and down, and roll side to side in bed with modified independence. 2.  Patient will transfer from bed to chair and chair to bed with modified independence using the least restrictive device. 3.  Patient will perform sit to stand with modified independence. 4.  Patient will ambulate with modified independence for 75 feet with the least restrictive device. 5.  Patient will ascend/descend 5 stairs with unilateral handrail(s) with supervision/set-up. PLOF: Pt reporting independent prior to 1 month ago with increased R hip and R sided low back pain. Pt reports now needing a RW for mobility and stays mostly in the bed. Lives with family in 1 story house with 5 LULY.   7/20/2022 1416 by LEISA Burkett  Outcome: Progressing Towards Goal     PHYSICAL THERAPY TREATMENT    Patient: Thompson Lozano (30 y.o. male)  Date: 7/20/2022  Diagnosis: Septic joint ([address] [M00.9]  Osteomyelitis ([address] [M86.9]  IV drug user [F19.90] Osteomyelitis ([facility] [address]      Precautions: Fall    ASSESSMENT:  Pt reclined in bed, NAD, and agreeable to therapy. Reporting 3/10 pain. Pt mod I for transfer to EOB and STS to RW. Ambulated with supervision to stairs. Educated on stair safety, verbalized and demonstrated understanding. Negotiated 8 stairs with unilateral UE support with railing with SBA. Ambulated 400ft with RW, mod I, denying rest break. Gait mechanics improved to step through pattern throughout entire ambulation trial. Returned to room for therex. Able to tolerate 2x10 sets of standing 4 way hip today with improved tolerance of RLE unilateral stance with UE support. Pt sat in chair, positioned for comfort with all needs met and call bell in reach.  Educated on need for RN assistance with mobility with hallway ambulation; verbalized understanding. Progression toward goals:   [x]      Improving appropriately and progressing toward goals  []      Improving slowly and progressing toward goals  []      Not making progress toward goals and plan of care will be adjusted     PLAN:  Patient continues to benefit from skilled intervention to address the above impairments. Continue treatment per established plan of care. Discharge Recommendations:  Home Health  Further Equipment Recommendations for Discharge:  rolling walker     [facility] Basic Mobility Inpatient Short Form:  18/24   This AMPA score should be considered in conjunction with interdisciplinary team recommendations to determine the most appropriate discharge setting. Patient's social support, diagnosis, medical stability, and prior level of function should also be taken into consideration. AM-PAC score of 18/24 (14/20 with stairs omitted) or greater is typically associated with a discharge to the patient's home setting. Based on the patient's AM-PAC score and their current functional mobility deficits, it is recommended that the patient have 2-3 sessions per week of Physical Therapy at d/c to increase the patient's independence. Please see assessment section for further patient specific details. SUBJECTIVE:   Patient stated I woke up today with basically no pain.     OBJECTIVE DATA SUMMARY:   Critical Behavior:  Neurologic State: Alert  Orientation Level: Oriented X4  Cognition: Follows commands     Psychosocial  Patient Behaviors: Calm; Cooperative  Purposeful Interaction: Yes  Pt Identified Daily Priority: Clinical issues (comment)  Caritas Process: Teaching/learning;Establish trust  Caring Interventions: Reassure  Reassure: Informing;Caring rounds  Therapeutic Modalities: Intentional therapeutic touch  Functional Mobility:  Bed Mobility:  Rolling: Modified independent  Supine to Sit: Modified independent  Transfers:  Sit to Stand: Modified independent  Stand to Sit: Modified independent  Balance:   Sitting: Intact; With support  Standing: Intact; With support  Ambulation/Gait Training:  Distance (ft): 400 Feet (ft)   Assistive Device: Walker, rolling  Ambulation - Level of Assistance: Modified independent  Stairs:   Level of Assistance: Stand-by assistance  Assistive Device:  None  Rail Use: right  Number of Stairs: 8    Therapeutic Exercises:   4 way hip at RW: 2x10  Standing calf stretch: [id number]    Pain:  Pain level pre-treatment: 3/10  Pain level post-treatment: 5/10     Activity Tolerance:   Good    After treatment:   [x] Patient left in no apparent distress sitting up in chair  [] Patient left in no apparent distress in bed  [x] Call bell left within reach  [x] Nursing notified  [] Caregiver present  [] Bed alarm activated  [] SCDs applied      COMMUNICATION/EDUCATION:   [x]         Role of physical therapy in the acute care setting. [x]         Fall prevention education was provided and the patient/caregiver indicated understanding. [x]         Patient/family have participated as able in working toward goals and plan of care. [x]         Patient/family agree to work toward stated goals and plan of care. []         Patient understands intent and goals of therapy, but is neutral about his/her participation. []         Patient is unable to participate in stated goals/plan of care: ongoing with therapy staff. LEISA Feldman   Time Calculation: 24 mins    [facility] AM-PAC® Basic Mobility Inpatient Short Form (6-Clicks) Version 2    How much HELP from another person does the patient currently need    (If the patient hasn't done an activity recently, how much help from another person do you think he/she would need if he/she tried?)   Total (Total A or Dep)   A Lot  (Mod to Max A)   A Little (Sup or Min A)   None (Mod I to I)   Turning from your back to your side while in a flat bed without using bedrails? [] 1 [] 2 [] 3 [x] 4   2.  Moving from lying on your back to sitting on the side of a flat bed without using bedrails? [] 1 [] 2 [] 3 [x] 4   3. Moving to and from a bed to a chair (including a wheelchair)? [] 1 [] 2 [] 3 [x] 4   4. Standing up from a chair using your arms (e.g., wheelchair, or bedside chair)? [] 1 [] 2 [] 3 [x] 4   5. Walking in hospital room? [] 1 [] 2 [] 3 [x] 4   6. Climbing 3-5 steps with a railing?+   [] 1 [] 2 [x] 3 [] 4   +If stair climbing cannot be assessed, skip item #6. Sum responses from items 1-5.

## 2022-07-20 NOTE — PROGRESS NOTES
Problem: Pain  Goal: *Control of Pain  Outcome: Progressing Towards Goal     Problem: Pressure Injury - Risk of  Goal: *Prevention of pressure injury  Description: Document Osiel Scale and appropriate interventions in the flowsheet. Outcome: Progressing Towards Goal  Note: Pressure Injury Interventions:  Sensory Interventions: Assess changes in LOC,Minimize linen layers,Maintain/enhance activity level,Keep linens dry and wrinkle-free    Moisture Interventions: Absorbent underpads    Activity Interventions: Pressure redistribution bed/mattress(bed type)    Mobility Interventions: Pressure redistribution bed/mattress (bed type)    Nutrition Interventions: Document food/fluid/supplement intake    Friction and Shear Interventions: Foam dressings/transparent film/skin sealants                Problem: Falls - Risk of  Goal: *Absence of Falls  Description: Document Aparna Fall Risk and appropriate interventions in the flowsheet.   Outcome: Progressing Towards Goal  Note: Fall Risk Interventions:  Mobility Interventions: Patient to call before getting OOB,PT Consult for mobility concerns,Utilize walker, cane, or other assistive device         Medication Interventions: Evaluate medications/consider consulting pharmacy,Patient to call before getting OOB    Elimination Interventions: Call light in reach,Toilet paper/wipes in reach,Urinal in reach    History of Falls Interventions: Door open when patient unattended,Evaluate medications/consider consulting pharmacy

## 2022-07-21 VITALS
TEMPERATURE: 97.7 F | RESPIRATION RATE: 14 BRPM | BODY MASS INDEX: 21.7 KG/M2 | OXYGEN SATURATION: 100 % | DIASTOLIC BLOOD PRESSURE: 66 MMHG | HEART RATE: 58 BPM | SYSTOLIC BLOOD PRESSURE: 101 MMHG | HEIGHT: 71 IN | WEIGHT: 155 LBS

## 2022-07-21 LAB
ANION GAP SERPL CALC-SCNC: 2 MMOL/L (ref 3–18)
BASOPHILS # BLD: 0.1 K/UL (ref 0–0.1)
BASOPHILS NFR BLD: 1 % (ref 0–2)
BUN SERPL-MCNC: 17 MG/DL (ref 7–18)
BUN/CREAT SERPL: 25 (ref 12–20)
CALCIUM SERPL-MCNC: 9.4 MG/DL (ref 8.5–10.1)
CHLORIDE SERPL-SCNC: 103 MMOL/L (ref 100–111)
CO2 SERPL-SCNC: 33 MMOL/L (ref 21–32)
CREAT SERPL-MCNC: 0.68 MG/DL (ref 0.6–1.3)
CRP SERPL-MCNC: 0.4 MG/DL (ref 0–0.3)
DIFFERENTIAL METHOD BLD: ABNORMAL
EOSINOPHIL # BLD: 0.3 K/UL (ref 0–0.4)
EOSINOPHIL NFR BLD: 4 % (ref 0–5)
ERYTHROCYTE [DISTWIDTH] IN BLOOD BY AUTOMATED COUNT: 13 % (ref 11.6–14.5)
GLUCOSE SERPL-MCNC: 90 MG/DL (ref 74–99)
HCT VFR BLD AUTO: 36.3 % (ref 36–48)
HGB BLD-MCNC: 12.2 G/DL (ref 13–16)
IMM GRANULOCYTES # BLD AUTO: 0.1 K/UL (ref 0–0.04)
IMM GRANULOCYTES NFR BLD AUTO: 1 % (ref 0–0.5)
LYMPHOCYTES # BLD: 2.8 K/UL (ref 0.9–3.6)
LYMPHOCYTES NFR BLD: 34 % (ref 21–52)
MCH RBC QN AUTO: 30.6 PG (ref 24–34)
MCHC RBC AUTO-ENTMCNC: 33.6 G/DL (ref 31–37)
MCV RBC AUTO: 91 FL (ref 78–100)
MONOCYTES # BLD: 0.9 K/UL (ref 0.05–1.2)
MONOCYTES NFR BLD: 11 % (ref 3–10)
NEUTS SEG # BLD: 3.9 K/UL (ref 1.8–8)
NEUTS SEG NFR BLD: 49 % (ref 40–73)
NRBC # BLD: 0 K/UL (ref 0–0.01)
NRBC BLD-RTO: 0 PER 100 WBC
PLATELET # BLD AUTO: 254 K/UL (ref 135–420)
PMV BLD AUTO: 9.2 FL (ref 9.2–11.8)
POTASSIUM SERPL-SCNC: 4.1 MMOL/L (ref 3.5–5.5)
RBC # BLD AUTO: 3.99 M/UL (ref 4.35–5.65)
SODIUM SERPL-SCNC: 138 MMOL/L (ref 136–145)
WBC # BLD AUTO: 8 K/UL (ref 4.6–13.2)

## 2022-07-21 PROCEDURE — 74011000250 HC RX REV CODE- 250: Performed by: INTERNAL MEDICINE

## 2022-07-21 PROCEDURE — 80048 BASIC METABOLIC PNL TOTAL CA: CPT

## 2022-07-21 PROCEDURE — 85025 COMPLETE CBC W/AUTO DIFF WBC: CPT

## 2022-07-21 PROCEDURE — 74011250637 HC RX REV CODE- 250/637: Performed by: INTERNAL MEDICINE

## 2022-07-21 PROCEDURE — 99239 HOSP IP/OBS DSCHRG MGMT >30: CPT | Performed by: FAMILY MEDICINE

## 2022-07-21 PROCEDURE — 86140 C-REACTIVE PROTEIN: CPT

## 2022-07-21 PROCEDURE — 74011250636 HC RX REV CODE- 250/636: Performed by: INTERNAL MEDICINE

## 2022-07-21 PROCEDURE — 36415 COLL VENOUS BLD VENIPUNCTURE: CPT

## 2022-07-21 PROCEDURE — 97116 GAIT TRAINING THERAPY: CPT

## 2022-07-21 RX ORDER — OXYCODONE AND ACETAMINOPHEN 5; 325 MG/1; MG/1
1 TABLET ORAL
Qty: 20 TABLET | Refills: 0 | Status: SHIPPED | OUTPATIENT
Start: 2022-07-21 | End: 2022-07-26

## 2022-07-21 RX ADMIN — WATER 2 G: 1 INJECTION INTRAMUSCULAR; INTRAVENOUS; SUBCUTANEOUS at 00:15

## 2022-07-21 RX ADMIN — Medication 5 MG: at 00:15

## 2022-07-21 RX ADMIN — SODIUM CHLORIDE, PRESERVATIVE FREE 10 ML: 5 INJECTION INTRAVENOUS at 06:23

## 2022-07-21 RX ADMIN — OXYCODONE HYDROCHLORIDE AND ACETAMINOPHEN 1 TABLET: 5; 325 TABLET ORAL at 09:20

## 2022-07-21 RX ADMIN — ONDANSETRON 4 MG: 4 TABLET, ORALLY DISINTEGRATING ORAL at 09:27

## 2022-07-21 RX ADMIN — WATER 2 G: 1 INJECTION INTRAMUSCULAR; INTRAVENOUS; SUBCUTANEOUS at 09:19

## 2022-07-21 RX ADMIN — OXYCODONE HYDROCHLORIDE AND ACETAMINOPHEN 1 TABLET: 5; 325 TABLET ORAL at 00:15

## 2022-07-21 RX ADMIN — OXYCODONE HYDROCHLORIDE AND ACETAMINOPHEN 1 TABLET: 5; 325 TABLET ORAL at 04:08

## 2022-07-21 NOTE — PROGRESS NOTES
KARLIE talked with Chioma with [facility] to discuss pt's dc plan of Home with Home Health. Chioma with [facility] reviewed and accepted pt for services. Chioma with [facility] asked for Social work yo be added to pt's Home health orders. KARLIE notified Dr. Dorothy Villanueva of amended home health order to add  social work via [facility].         Casandra Duarnt, MSW  Care Manager

## 2022-07-21 NOTE — PROGRESS NOTES
Infectious Disease progress Note        Reason:right SI joint septic arthritis    Current abx Prior abx   Cefepime, vancomycin since 7/12-7/13  Vancomycin started 7/15-7/18  Cefazolin since 7/18      Lines:       Assessment :  36 y.o. male with PMH significant of IVDA, chronic hepatitis C presented to [facility] ER on 7/12/22 with complaint of right buttock pain and Pain bearing weight on Right Leg. Clinical presentation likely suggestive of right SI joint septic arthritis. Recent history of IV drug abuse predisposes patient to indolent gram-positive/gram-negative infection. Also underlying immunocompromise state secondary to hepatitis C will mask the full clinical presentation. S/p IR guided SI joint aspiration 7/14/22. Cultures methicillin susceptible Staph aureus    Significant clinical improvement noted today. Improved right buttock/SI joint pain, tenderness    Recommendations:    1.  continue cefazolin   2. F/u spine surgery recommendations  3. Ideally patient needs IV cefazolin till 8/29/2022. Unfortunately, I am uncomfortable to place PICC line due to recent history of IV drug abuse. Also been unable to arrange for home IV antibiotics due to insurance issues. Under the circumstances, the second best option would be to arrange for full doses of IV dalbavancin 1 week apart at outpatient infusion center (was informed that co-pays around $90,000). Hence, recommend to discharge patient on po cephalexin till 9/20/22 if continued clinical improvement in am    Script for cephalexin given to . Above plan was discussed in details with patient, dr. Duarte Peers. All in agreement. Please call me if any further questions or concerns. Will continue to participate in the care of this patient. HPI:  Feels much better. Resolved right buttock pain.   Able to ambulate        Past Medical History:   Diagnosis Date    ADHD (attention deficit hyperactivity disorder)     Asthma     Bipolar disorder ([facility] [address] Facial injury     Hepatitis C     Heroin abuse ([facility] [address]     treatment in [facility] 2016    Lyme disease        Past Surgical History:   Procedure Laterality Date    HX TYMPANOSTOMY      bilateral TM's as a child       Home meidcations Details   melatonin 5 mg tablet Take 5 mg by mouth nightly. acetaminophen (Tylenol Extra Strength) 500 mg tablet Take 500 mg by mouth every six (6) hours as needed for Pain. albuterol (PROVENTIL HFA, VENTOLIN HFA, PROAIR HFA) 90 mcg/actuation inhaler Take 2 Puffs by inhalation every six (6) hours as needed for Wheezing. Qty: 1 Inhaler, Refills: 0    Associated Diagnoses: Tobacco abuse disorder;  History of asthma             Current Facility-Administered Medications   Medication Dose Route Frequency    ceFAZolin (ANCEF) 2 g in sterile water (preservative free) 20 mL IV syringe  2 g IntraVENous Q8H    sodium chloride (NS) flush 5-40 mL  5-40 mL IntraVENous Q8H    sodium chloride (NS) flush 5-40 mL  5-40 mL IntraVENous PRN    polyethylene glycol (MIRALAX) packet 17 g  17 g Oral DAILY PRN    ondansetron (ZOFRAN ODT) tablet 4 mg  4 mg Oral Q8H PRN    Or    ondansetron (ZOFRAN) injection 4 mg  4 mg IntraVENous Q6H PRN    albuterol-ipratropium (DUO-NEB) 2.5 MG-0.5 MG/3 ML  3 mL Nebulization Q6H PRN    melatonin tablet 5 mg  5 mg Oral QHS PRN    nicotine (NICODERM CQ) 21 mg/24 hr patch 1 Patch  1 Patch TransDERmal DAILY PRN    oxyCODONE-acetaminophen (PERCOCET) 5-325 mg per tablet 1 Tablet  1 Tablet Oral Q4H PRN    naloxone (NARCAN) injection 0.4 mg  0.4 mg IntraVENous EVERY 2 MINUTES AS NEEDED       Allergies: Tylenol [acetaminophen]    Family History   Problem Relation Age of Onset    Emphysema Mother     COPD Mother     No Known Problems Father     Attention Deficit Hyperactivity Disorder Brother     Diabetes Maternal Grandmother     Stroke Maternal Grandmother     Kidney Disease Maternal Grandfather     Liver Disease Paternal Grandfather         Cirrhosis caused death     Social History     Socioeconomic History    Marital status: SINGLE     Spouse name: Not on file    Number of children: 0    Years of education: 9    Highest education level: GED or equivalent   Occupational History    Occupation:     Tobacco Use    Smoking status: Every Day     Packs/day: 1.00     Years: 10.00     Pack years: 10.00     Types: Cigarettes    Smokeless tobacco: Former   Substance and Sexual Activity    Alcohol use: Yes     Alcohol/week: 3.0 standard drinks     Types: 3 Cans of beer per week     Comment: Occasional    Drug use: Yes     Types: Marijuana    Sexual activity: Not Currently     Partners: Female   Other Topics Concern     Service No    Blood Transfusions No    Caffeine Concern No    Occupational Exposure Yes     Comment: Dust in [employer] Hazards Yes     Comment: riding horses    Sleep Concern Yes     Comment: \"trouble sleeping\"    Stress Concern Yes    Weight Concern No    Special Diet No    Back Care No    Exercise Yes     Comment: runs    Bike Helmet No    Seat Belt Yes    Self-Exams Yes   Social History Narrative    Not on file     Social Determinants of Health     Financial Resource Strain: Not on file   Food Insecurity: Not on file   Transportation Needs: Not on file   Physical Activity: Not on file   Stress: Not on file   Social Connections: Not on file   Intimate Partner Violence: Not on file   Housing Stability: Not on file     Social History     Tobacco Use   Smoking Status Every Day    Packs/day: 1.00    Years: 10.00    Pack years: 10.00    Types: Cigarettes   Smokeless Tobacco Former        Temp (24hrs), Av °F (36.7 °C), Min:97.8 °F (36.6 °C), Max:98.1 °F (36.7 °C)    Visit Vitals  BP (!) 100/59   Pulse 78   Temp 98 °F (36.7 °C)   Resp 20   Ht 5' 11\" (1.803 m)   Wt 70.3 kg (155 lb)   SpO2 98%   BMI 21.62 kg/m²       ROS: 12 point ROS obtained in details.  Pertinent positives as mentioned in HPI,   otherwise negative    Physical Exam:    General: Well developed, well nourished male laying on the bed/sitting on the  bed AAOx3 in no acute distress. General:   awake alert and oriented   HEENT:  Normocephalic, atraumatic,  EOMI, no scleral icterus or pallor; no conjunctival hemmohage;  nasal and oral mucous are moist and without evidence of lesions. No thrush. Dentition poor. Neck supple, no bruits. Lymph Nodes:   not examined   Lungs:   non-labored, bilaterally clear to auscultation- no crackles wheezes rales or rhonchi   Heart:  RRR, s1 and s2; no rubs or gallops, no edema, + pedal pulses   Abdomen:  soft, non-distended, active bowel sounds, no hepatomegaly, no splenomegaly. Non-tender   Genitourinary:  deferred   Extremities:   no clubbing, cyanosis; no joint effusions or swelling; resolved tenderness over right SI joint/right buttock- no erythema, decreased right buttock on movements of RLE; muscle mass appropriate for age   Neurologic:  No gross focal sensory abnormalities; 5/5 muscle strength to upper and lower extremities. Speech appropriate. Cranial nerves intact                        Skin:  No rash or ulcers noted   Back:  no spinal or paraspinal muscle tenderness or rigidity, no CVA tenderness     Psychiatric:  No suicidal or homicidal ideations, appropriate mood and affect         Labs: Results:   Chemistry Recent Labs     07/19/22  0249 07/18/22  0304   GLU 97 97    138   K 4.5 4.1    104   CO2 33* 30   BUN 18 15   CREA 0.60 0.54*   CA 9.7 9.4   AGAP 4 4   BUCR 30* 28*        CBC w/Diff No results for input(s): WBC, RBC, HGB, HCT, PLT, GRANS, LYMPH, EOS, HGBEXT, HCTEXT, PLTEXT, HGBEXT, HCTEXT, PLTEXT in the last 72 hours. Microbiology No results for input(s): CULT in the last 72 hours.        RADIOLOGY:    All available imaging studies/reports in [facility] for this admission were reviewed      Disclaimer: Sections of this note are dictated utilizing voice recognition software, which may have resulted in some phonetic based errors in grammar and contents. Even though attempts were made to correct all the mistakes, some may have been missed, and remained in the body of the document. If questions arise, please contact our department.     Dr. Phoebe Castro, Infectious Disease Specialist  [phone]  July 20, 2022  8:56 AM

## 2022-07-21 NOTE — ROUTINE PROCESS
Patient is alert and oriented times three with no signs or symptoms of distress.  Dressing in the right groin is clean dry and intact

## 2022-07-21 NOTE — ROUTINE PROCESS
Bedside shift change report given to Patricia Redding (oncoming nurse) by Alexys Carmona (offgoing nurse). Report included the following information SBAR and Kardex.

## 2022-07-21 NOTE — PROGRESS NOTES
Discussed discharge with the patient and all questioned fully answered. He will call me if any problems arise.

## 2022-07-21 NOTE — ROUTINE PROCESS
Patient is alert and oriented times three with no signs or symptoms of distress.  Says he is ready to go home

## 2022-07-21 NOTE — ROUTINE PROCESS
Wound Prevention Checklist    Patient: Miguel Barnes  (30 y.o. male)  Date: 7/21/2022  Diagnosis: Septic joint ([address] [M00.9]  Osteomyelitis ([address] [M86.9]  IV drug user [F19.90] Osteomyelitis ([address]    Precautions: Fall       []  Heel prevention boots placed on patient    [x]  Patient turned q2h during shift    []  Lift team ordered    [x]  Patient on [facility] bed/Specialty bed    []  Each Wound is documented during shift (Stage, Color, drainage, odor, measurements, and dressings)    [x]  Dual skin checks done at bedside during shift report with sanket Maxwell RN

## 2022-07-21 NOTE — PROGRESS NOTES
Discharge order noted for today. Pt has been accepted to [address] [place] [address] agency. Met with patient and pt is agreeable to the transition plan today. Transport has been arranged through pt. Mother provide transport at [place]. Patient's discharge summary and home health  orders have been forwarded to [facility] home health  agency via [facility] [address] Updated bedside RN, Mihai Mckeon,  to the transition plan.   Discharge information has been documented on the AVS.       MARK Terry  Care Manager

## 2022-07-21 NOTE — PROGRESS NOTES
Problem: Mobility Impaired (Adult and Pediatric)  Goal: *Acute Goals and Plan of Care (Insert Text)  Description: 1. Patient will move from supine to sit and sit to supine , scoot up and down, and roll side to side in bed with modified independence. 2.  Patient will transfer from bed to chair and chair to bed with modified independence using the least restrictive device. 3.  Patient will perform sit to stand with modified independence. 4.  Patient will ambulate with modified independence for 75 feet with the least restrictive device. 5.  Patient will ascend/descend 5 stairs with unilateral handrail(s) with supervision/set-up. PLOF: Pt reporting independent prior to 1 month ago with increased R hip and R sided low back pain. Pt reports now needing a RW for mobility and stays mostly in the bed. Lives with family in 1 story house with 5 LULY. Outcome: Resolved/Met   PHYSICAL THERAPY TREATMENT AND DISCHARGE    Patient: Luther Dozier (30 y.o. male)  Date: 7/21/2022  Diagnosis: Septic joint ([address] [M00.9]  Osteomyelitis ([address] [M86.9]  IV drug user [F19.90] Osteomyelitis ([facility] [address]      Precautions:  (standard)    ASSESSMENT:  Pt cleared to participate in PT session, pt received semi-reclined in bed and agreeable to therapy session. Pt demonstrating Sandra bed mobility, and ambulating with antalgic gait pattern and RW. Pt ambulating x200 feet with Sandra. Ascending/descending 10 steps with supervision. Pt then returning to room, no questions or concern regarding mobility. Discussed to continue to take his time with mobility, working on LE exercises and stretching. Pt positioned for comfort and educated to call for assist before getting up, pt verbalized understanding. Pt left with all needs met and call bell in reach. RN notified of position and participation. Will sign off.           PLAN:  Maximum therapeutic gains met at current level of care and patient will be discharged from physical therapy at this time.  Rationale for discharge:  [x]     Goals Achieved  []     [address]  []     Patient not participating in therapy  []     Other:  Discharge Recommendations:  Home Health  Further Equipment Recommendations for Discharge:  rolling walker    AMPAC: Sarah Taylor. scored a 23/24 on the AM-PAC short mobility form. Current research shows that an AM-PAC score of 18 (14 if omitting stairs) or greater is typically associated with a discharge to the patient's home setting. Based on the patient's AM-PAC score and their current functional mobility deficits, it is recommended that the patient have 2-3 sessions per week of Physical Therapy at d/c to increase the patient's independence. At this time, this patient demonstrates the endurance and safety to discharge home with [address]  (home vs OP services) and a follow up treatment frequency of 2-3x/wk. Please see assessment section for further patient specific details. This AMPAC score should be considered in conjunction with interdisciplinary team recommendations to determine the most appropriate discharge setting. Patient's social support, diagnosis, medical stability, and prior level of function should also be taken into consideration. SUBJECTIVE:   Patient stated Daun Reining you, its been a big help.     OBJECTIVE DATA SUMMARY:   Critical Behavior:  Neurologic State: Alert  Orientation Level: Oriented X4  Cognition: Appropriate decision making, Appropriate for age attention/concentration, Appropriate safety awareness  Safety/Judgement: Awareness of environment, Fall prevention  Functional Mobility Training:  Bed Mobility:     Supine to Sit: Modified independent  Sit to Supine: Modified independent  Scooting: Modified independent    Transfers:  Sit to Stand: Modified independent  Stand to Sit: Modified independent    Balance:  Sitting: Intact  Standing: Intact; With support (RW)  Standing - Static: Good  Standing - Dynamic : Good      Posture:   Posture (WDL): Within defined limits     Ambulation/Gait Training:  Distance (ft): 250 Feet (ft)  Assistive Device: Walker, rolling  Ambulation - Level of Assistance: Modified independent    Gait Abnormalities: Decreased step clearance; Antalgic    Base of Support: Narrowed  Stance: Right decreased  Speed/Amrita: Shuffled  Step Length: Right shortened     Stairs:  Number of Stairs Trained: 10  Stairs - Level of Assistance: Stand-by assistance  Rail Use: Left     Pain:  Pain level pre-treatment: 0/10   Pain level post-treatment: 0/10     Activity Tolerance:   Good     Please refer to the flowsheet for vital signs taken during this treatment. After treatment:   [] Patient left in no apparent distress sitting up in chair  [x] Patient left in no apparent distress in bed  [x] Call bell left within reach  [x] Nursing notified  [] Caregiver present  [] Bed alarm activated  [] SCDs applied      COMMUNICATION/EDUCATION:   [x]         Role of Physical Therapy in the acute care setting. [x]         Fall prevention education was provided and the patient/caregiver indicated understanding. [x]         Patient/family have participated as able and agree with findings and recommendations. []         Patient is unable to participate in plan of care at this time. []         Other:        Dara Duran, PT   Time Calculation: 8 mins    [facility] [address] [address] AM-PAC® Basic Mobility Inpatient Short Form (6-Clicks) Version 2    How much HELP from another person does the patient currently need    (If the patient hasn't done an activity recently, how much help from another person do you think he/she would need if he/she tried?)   Total (Total A or Dep)   A Lot  (Mod to Max A)   A Little (Sup or Min A)   None (Mod I to I)   Turning from your back to your side while in a flat bed without using bedrails? [] 1 [] 2 [] 3 [x] 4   2.  Moving from lying on your back to sitting on the side of a flat bed without using bedrails? [] 1 [] 2 [] 3 [] 4   3. Moving to and from a bed to a chair (including a wheelchair)? [] 1 [] 2 [] 3 [x] 4   4. Standing up from a chair using your arms (e.g., wheelchair, or bedside chair)? [] 1 [] 2 [] 3 [x] 4   5. Walking in hospital room? [] 1 [] 2 [] 3 [x] 4   6. Climbing 3-5 steps with a railing?+   [] 1 [] 2 [x] 3 [] 4   +If stair climbing cannot be assessed, skip item #6. Sum responses from items 1-5.

## 2022-07-21 NOTE — HOME CARE
Received home health referral for [facility] for (PT, OT, MSW). Spoke with patient via phone and in room. patient identifiers verified. Explained home care services and routines. Also explained cost per visit per each disciplines, for Medicaid Pending. Demographics verified including insurance, phone and address confirmed. Patient has the following DME: rolling walker. Caregivers available family to assist.  Orders noted and arranged to be processed to central intake.      ---   Shalonda Kim LPN  [facility] - INPATIENT Liaison

## 2022-07-21 NOTE — PROGRESS NOTES
Patient has transitional care follow up with Dr. Roney Beltran on 08/22/2022 arrive at 10:30 am for 11:00 am. Internist of [facility], [address].

## 2022-07-21 NOTE — PROGRESS NOTES
Problem: Pain  Goal: *Control of Pain  Outcome: Progressing Towards Goal  Goal: *PALLIATIVE CARE:  Alleviation of Pain  Outcome: Progressing Towards Goal     Problem: Pressure Injury - Risk of  Goal: *Prevention of pressure injury  Description: Document Osiel Scale and appropriate interventions in the flowsheet. Outcome: Progressing Towards Goal  Note: Pressure Injury Interventions:  Sensory Interventions: Assess changes in LOC, Assess need for specialty bed, Discuss PT/OT consult with provider, Keep linens dry and wrinkle-free, Maintain/enhance activity level    Moisture Interventions: Absorbent underpads, Apply protective barrier, creams and emollients, Check for incontinence Q2 hours and as needed, Internal/External urinary devices    Activity Interventions: Assess need for specialty bed, Increase time out of bed, Pressure redistribution bed/mattress(bed type), PT/OT evaluation    Mobility Interventions: Assess need for specialty bed, HOB 30 degrees or less, Pressure redistribution bed/mattress (bed type), PT/OT evaluation    Nutrition Interventions: Document food/fluid/supplement intake, Offer support with meals,snacks and hydration    Friction and Shear Interventions: Apply protective barrier, creams and emollients, Foam dressings/transparent film/skin sealants, Lift sheet                Problem: Patient Education: Go to Patient Education Activity  Goal: Patient/Family Education  Outcome: Progressing Towards Goal     Problem: Falls - Risk of  Goal: *Absence of Falls  Description: Document Aparna Fall Risk and appropriate interventions in the flowsheet.   Outcome: Progressing Towards Goal  Note: Fall Risk Interventions:  Mobility Interventions: Assess mobility with egress test, Communicate number of staff needed for ambulation/transfer, OT consult for ADLs, Patient to call before getting OOB, PT Consult for mobility concerns         Medication Interventions: Assess postural VS orthostatic hypotension, Evaluate medications/consider consulting pharmacy, Patient to call before getting OOB    Elimination Interventions: Call light in reach, Elevated toilet seat, Patient to call for help with toileting needs, Toilet paper/wipes in reach, Toileting schedule/hourly rounds, Urinal in reach, Stay With Me (per policy)    History of Falls Interventions: Consult care management for discharge planning, Evaluate medications/consider consulting pharmacy, Utilize gait belt for transfer/ambulation         Problem: Patient Education: Go to Patient Education Activity  Goal: Patient/Family Education  Outcome: Progressing Towards Goal     Problem: Patient Education: Go to Patient Education Activity  Goal: Patient/Family Education  Outcome: Progressing Towards Goal

## 2022-07-22 ENCOUNTER — HOME CARE VISIT (OUTPATIENT)
Dept: HOME HEALTH SERVICES | Facility: HOME HEALTH | Age: 32
End: 2022-07-22

## 2022-07-24 ENCOUNTER — HOME CARE VISIT (OUTPATIENT)
Dept: HOME HEALTH SERVICES | Facility: HOME HEALTH | Age: 32
End: 2022-07-24

## 2022-07-24 NOTE — CASE COMMUNICATION
Called the patient's mother in an attempt to schedule the home healthcare admission visit at [facility]       [phone] and left a VM requesting an appointment. No return call received.  Scheduling notified

## 2022-07-24 NOTE — CASE COMMUNICATION
Called patient at Home: [phone] and left a message in an attempt to schedule the [address] [place] [address].  No return call received

## 2022-07-25 ENCOUNTER — HOME CARE VISIT (OUTPATIENT)
Dept: HOME HEALTH SERVICES | Facility: HOME HEALTH | Age: 32
End: 2022-07-25

## 2022-07-26 ENCOUNTER — HOME CARE VISIT (OUTPATIENT)
Dept: HOME HEALTH SERVICES | Facility: HOME HEALTH | Age: 32
End: 2022-07-26

## 2022-08-01 NOTE — DISCHARGE SUMMARY
Discharge Summary    Patient: Venancio Amaya MRN: [id number]  CSN: [id number]    YOB: 1990  Age: 36 y.o. Sex: male    DOA: 7/12/2022 LOS:  LOS: 9 days   Discharge Date: 7/21/2022     Admission Diagnoses: Septic joint ([address] [M00.9]  Osteomyelitis ([address] [M86.9]  IV drug user [F19.90]    Discharge Diagnoses:    MSSA SI joint infection  Concern for sacral osteomyelitis  IVDA - heroin  Tobacco use/active smoker    Discharge Condition: Stable    PHYSICAL EXAM  Visit Vitals  /66   Pulse (!) 58   Temp 97.7 °F (36.5 °C)   Resp 14   Ht 5' 11\" (1.803 m)   Wt 70.3 kg (155 lb)   SpO2 100%   BMI 21.62 kg/m²       General: In NAD. Nontoxic-appearing. HEENT: NCAT. Sclerae anicteric, EOMI. OP clear. Lungs:  Clear, no wheezes. No accessory muscle use. Heart:  RRR. Abdomen: Soft, NT/ND. Extremities: Warm, no edema or ischemia. Psych:   Mood normal.  Neurologic:  Awake and alert, moves extremities spontaneously. Motor grossly nonfocal.    Hospital Course:   See admission H&P for full details of HPI. Patient was admitted to the hospital after presenting to the emergency department for evaluation of back pain and difficulty bearing weight on right leg. Patient has known history of IV heroin use and stated that her symptoms have been present for at least 1 month prior to arrival.  Imaging studies were concerning for possible osteomyelitis of sacrum as well as septic sacroiliac joint. Empiric antibiotic therapy was initiated after cultures were obtained. Orthopedic/spine surgery and ID evaluations were obtained. Blood cultures ultimately grew MSSA and antibiotic therapy was continued to be managed by infectious diseases. There was felt to be no role for surgical intervention and no surgical procedures were performed. Patient did have image guided SI joint aspiration done on 7/14/2022.   Given patient's history of IV drug abuse, recommendation was for patient to receive several days of IV antibiotic therapy while hospitalized prior to transitioning him to oral antibiotic therapy for a planned prolonged duration of treatment. Patient has continued to improve significantly with regard to his pain. There has been no evidence for any active or ongoing infection. PT/OT evaluations were obtained this hospitalization and recommendation was for home health care services at discharge. A rolling walker has been ordered for patient to get discharge as well. Overall clinical course was continued to improve and patient has remained medically stable. He is felt to have met maximum benefit of hospitalization and is medically stable for discharge home with home health care services as outlined. Importance of completing antibiotic therapy entire course of has been reiterated. A PCP has been obtained for patient prior to discharge. Consults:   Orthopedic/spine surgery  Infectious diseases  Interventional radiology    Significant Diagnostic Studies/Procedures:   RADIOLOGY POST PROCEDURE NOTE      July 14, 2022       11:03 AM      Preoperative Diagnosis:  Right SI septic joint. Postoperative Diagnosis:  Same. :  Dr. Herberth Weinberg     Assistant:  None. Type of Anesthesia: 1% plain lidocaine and IV moderate sedation with Versed and Fentanyl. Procedure/Description:  Image guided RSI joint aspiration. Findings:   No bleeding. ~ 1cc of serosanguinous fluid was aspirated and sent for requested labs. Estimated blood Loss:  Minimal     Specimen Removed:   yes     Blood transfusions:  None. Implants:  None. Complications: None     Condition: Stable     Discharge Plan:  continue present therapy     Libia Jones MD        CT head:  IMPRESSION  Negative noncontrast head CT. CXR:    IMPRESSION  1. No acute cardiopulmonary process. XR right hip:    IMPRESSION  1. No acute pathology in the pelvis or the right hip.           Discharge Medications:     Discharge Medication List as of 7/21/2022 11:23 AM        START taking these medications    Details   oxyCODONE-acetaminophen (PERCOCET) 5-325 mg per tablet Take 1 Tablet by mouth every six (6) hours as needed for Pain for up to 5 days. Max Daily Amount: 4 Tablets. , Print, Disp-20 Tablet, R-0      cephALEXin (KEFLEX) 500 mg capsule Take 1 Capsule by mouth four (4) times daily for 28 days. , Print, Disp-112 Capsule, R-1           CONTINUE these medications which have NOT CHANGED    Details   melatonin 5 mg tablet Take 5 mg by mouth nightly., Historical Med           STOP taking these medications       acetaminophen (Tylenol Extra Strength) 500 mg tablet Comments:   Reason for Stopping:         albuterol (PROVENTIL HFA, VENTOLIN HFA, PROAIR HFA) 90 mcg/actuation inhaler Comments:   Reason for Stopping:                   Activity: As tolerated. Diet: Regular. Disposition: Home with home health care services. Follow-up: with PCP in 1 week. Minutes spent on discharge: >30 minutes spent coordinating this discharge. Marcelino Veliz MD  [address] [place], [place] [address]ists    Disclaimer: Sections of this note are dictated using utilizing voice recognition software. Minor typographical errors may be present. If questions arise, please do not hesitate to contact me or call our department.

## 2022-08-15 LAB
BACTERIA SPEC CULT: NORMAL
SERVICE CMNT-IMP: NORMAL

## 2022-08-22 ENCOUNTER — OFFICE VISIT (OUTPATIENT)
Dept: INTERNAL MEDICINE CLINIC | Age: 32
End: 2022-08-22

## 2022-08-22 ENCOUNTER — HOSPITAL ENCOUNTER (OUTPATIENT)
Dept: LAB | Age: 32
Discharge: HOME | End: 2022-08-22

## 2022-08-22 VITALS
HEART RATE: 93 BPM | TEMPERATURE: 98.6 F | RESPIRATION RATE: 18 BRPM | WEIGHT: 175 LBS | SYSTOLIC BLOOD PRESSURE: 114 MMHG | BODY MASS INDEX: 23.19 KG/M2 | OXYGEN SATURATION: 99 % | HEIGHT: 73 IN | DIASTOLIC BLOOD PRESSURE: 81 MMHG

## 2022-08-22 DIAGNOSIS — M00.9 SEPTIC ARTHRITIS, DUE TO UNSPECIFIED ORGANISM, SEPTIC ARTHRITIS OF UNSPECIFIED LOCATION (HCC): Primary | ICD-10-CM

## 2022-08-22 DIAGNOSIS — Z76.89 ENCOUNTER TO ESTABLISH CARE: ICD-10-CM

## 2022-08-22 DIAGNOSIS — F19.90 IV DRUG USER: ICD-10-CM

## 2022-08-22 DIAGNOSIS — B18.2 CHRONIC HEPATITIS C WITHOUT HEPATIC COMA (HCC): ICD-10-CM

## 2022-08-22 DIAGNOSIS — D64.9 LOW HEMOGLOBIN: ICD-10-CM

## 2022-08-22 DIAGNOSIS — M00.9 SEPTIC ARTHRITIS, DUE TO UNSPECIFIED ORGANISM, SEPTIC ARTHRITIS OF UNSPECIFIED LOCATION (HCC): ICD-10-CM

## 2022-08-22 LAB
BASOPHILS # BLD: 0.1 K/UL (ref 0–0.1)
BASOPHILS NFR BLD: 1 % (ref 0–2)
CRP SERPL-MCNC: 0.5 MG/DL (ref 0–0.3)
DIFFERENTIAL METHOD BLD: ABNORMAL
EOSINOPHIL # BLD: 0.3 K/UL (ref 0–0.4)
EOSINOPHIL NFR BLD: 5 % (ref 0–5)
ERYTHROCYTE [DISTWIDTH] IN BLOOD BY AUTOMATED COUNT: 14.1 % (ref 11.6–14.5)
HCT VFR BLD AUTO: 46.9 % (ref 36–48)
HGB BLD-MCNC: 15.2 G/DL (ref 13–16)
IMM GRANULOCYTES # BLD AUTO: 0.1 K/UL (ref 0–0.04)
IMM GRANULOCYTES NFR BLD AUTO: 1 % (ref 0–0.5)
LYMPHOCYTES # BLD: 2.1 K/UL (ref 0.9–3.6)
LYMPHOCYTES NFR BLD: 32 % (ref 21–52)
MCH RBC QN AUTO: 30.5 PG (ref 24–34)
MCHC RBC AUTO-ENTMCNC: 32.4 G/DL (ref 31–37)
MCV RBC AUTO: 94 FL (ref 78–100)
MONOCYTES # BLD: 0.7 K/UL (ref 0.05–1.2)
MONOCYTES NFR BLD: 10 % (ref 3–10)
NEUTS SEG # BLD: 3.6 K/UL (ref 1.8–8)
NEUTS SEG NFR BLD: 52 % (ref 40–73)
NRBC # BLD: 0 K/UL (ref 0–0.01)
NRBC BLD-RTO: 0 PER 100 WBC
PLATELET # BLD AUTO: 256 K/UL (ref 135–420)
PMV BLD AUTO: 9.5 FL (ref 9.2–11.8)
RBC # BLD AUTO: 4.99 M/UL (ref 4.35–5.65)
WBC # BLD AUTO: 6.8 K/UL (ref 4.6–13.2)

## 2022-08-22 PROCEDURE — 90715 TDAP VACCINE 7 YRS/> IM: CPT | Performed by: INTERNAL MEDICINE

## 2022-08-22 PROCEDURE — 99204 OFFICE O/P NEW MOD 45 MIN: CPT | Performed by: INTERNAL MEDICINE

## 2022-08-22 PROCEDURE — 36415 COLL VENOUS BLD VENIPUNCTURE: CPT

## 2022-08-22 PROCEDURE — 85025 COMPLETE CBC W/AUTO DIFF WBC: CPT

## 2022-08-22 PROCEDURE — 80053 COMPREHEN METABOLIC PANEL: CPT

## 2022-08-22 PROCEDURE — 86140 C-REACTIVE PROTEIN: CPT

## 2022-08-22 RX ORDER — CEPHALEXIN 500 MG/1
500 CAPSULE ORAL 4 TIMES DAILY
Qty: 112 CAPSULE | Refills: 0
Start: 2022-08-22 | End: 2022-08-22

## 2022-08-22 RX ORDER — CEPHALEXIN 500 MG/1
500 CAPSULE ORAL 4 TIMES DAILY
Qty: 112 CAPSULE | Refills: 0 | Status: SHIPPED | OUTPATIENT
Start: 2022-08-22 | End: 2022-09-19

## 2022-08-22 NOTE — ASSESSMENT & PLAN NOTE
Patient does not do IV drugs any more. Patient had been in [address] of IV heroine use for about 3 monthss.

## 2022-08-22 NOTE — PROGRESS NOTES
Kate Ace. presents today for   Chief Complaint   Patient presents with    Establish Care       1. \"Have you been to the ER, urgent care clinic since your last visit? Hospitalized since your last visit? \" yes    2. \"Have you seen or consulted any other health care providers outside of the [address] [place] [address] since your last visit? \" no     3. For patients aged 39-70: Has the patient had a colonoscopy / FIT/ Cologuard? NA - based on age      If the patient is female:    4. For patients aged 41-77: Has the patient had a mammogram within the past 2 years? NA - based on age or sex  See top three    5. For patients aged 21-65: Has the patient had a pap smear?  NA - based on age or sex

## 2022-08-22 NOTE — PROGRESS NOTES
Jayla Salazar is a 36y.o. year old male who is a new patient to me today. Subjective:   Jayla Salazar was seen today for Establish Care    36year old male past medical history of Chronic Hepatitis C, IV drug abuse in recent past was admitted [facility] with right buttock pain and was found to have septic arthritis of Right SI joint. IR guided SI joint aspiration on 7/14 cultures grew Methicillin Susceptible Staph Aureus. Patient received IV antibiotics in hospital and then discharged on Keflex to continue till 9/20/22. Today the patient is here for Hospital follow up and to establish care. Patient says that he is doing fine. The pain is under control with out any pain medications. There is minimal discomfort on movement. No fever or chills. No GI/  symptoms. No Cardiac/ Respiratory symptoms.     Past Medical History:   Diagnosis Date    ADHD (attention deficit hyperactivity disorder)     Asthma     Bipolar disorder ([facility]     Facial injury     Hepatitis C     Heroin abuse ([facility] [address]     treatment in [facility] 2016    Lyme disease     Osteomyelitis ([facility]        Past Surgical History:   Procedure Laterality Date    HX TYMPANOSTOMY      bilateral TM's as a child       Family History   Problem Relation Age of Onset    Diabetes Mother     Stroke Mother     Emphysema Mother     COPD Mother     Substance Abuse Father     Attention Deficit Hyperactivity Disorder Brother     Diabetes Maternal Grandmother     Stroke Maternal Grandmother     Kidney Disease Maternal Grandfather     Liver Disease Paternal Grandfather         Cirrhosis caused death       Social History     Socioeconomic History    Marital status: SINGLE     Spouse name: Not on file    Number of children: 0    Years of education: 9    Highest education level: GED or equivalent   Occupational History    Occupation:     Tobacco Use    Smoking status: Every Day     Packs/day: 1.00     Years: 10.00     Pack years: 10.00 Types: Cigarettes    Smokeless tobacco: Former   Vaping Use    Vaping Use: Never used   Substance and Sexual Activity    Alcohol use: Yes     Alcohol/week: 3.0 standard drinks     Types: 3 Cans of beer per week     Comment: Occasional    Drug use: Yes     Types: Marijuana    Sexual activity: Not Currently     Partners: Female   Other Topics Concern     Service No    Blood Transfusions No    Caffeine Concern No    Occupational Exposure Yes     Comment: Dust in [address] Yes     Comment: riding horses    Sleep Concern Yes     Comment: \"trouble sleeping\"    Stress Concern Yes    Weight Concern No    Special Diet No    Back Care No    Exercise Yes     Comment: runs    Bike Helmet No    Seat Belt Yes    Self-Exams Yes   Social History Narrative    Not on file     Social Determinants of Health     Financial Resource Strain: Not on file   Food Insecurity: Not on file   Transportation Needs: Not on file   Physical Activity: Not on file   Stress: Not on file   Social Connections: Not on file   Intimate Partner Violence: Not on file   Housing Stability: Not on file       Allergies   Allergen Reactions    Tylenol [Acetaminophen] Other (comments)     \" Have liver problems and should not take\"          Prior to Admission medications    Medication Sig Start Date End Date Taking? Authorizing Provider   melatonin 5 mg tablet Take 5 mg by mouth nightly. Provider, Historical        Review of Systems   Constitutional: Negative. HENT: Negative. Eyes: Negative. Respiratory: Negative. Cardiovascular: Negative. Gastrointestinal: Negative. Genitourinary: Negative. Musculoskeletal:  Positive for back pain. Skin: Negative. Neurological: Negative. Endo/Heme/Allergies: Negative. Psychiatric/Behavioral: Negative.            Objective:     Vitals:    08/22/22 1113   BP: 114/81   Pulse: 93   Resp: 18   Temp: 98.6 °F (37 °C)   TempSrc: Temporal   SpO2: 99%   Weight: 175 lb (79.4 kg) Height: 6' 1\" (1.854 m)      Physical Exam  Constitutional:       Appearance: Normal appearance. HENT:      Head: Normocephalic and atraumatic. Ears:      Comments: Bilateral ear cerumen noted     Nose: Nose normal.      Mouth/Throat:      Mouth: Mucous membranes are moist.   Eyes:      Pupils: Pupils are equal, round, and reactive to light. Cardiovascular:      Rate and Rhythm: Normal rate and regular rhythm. Pulses: Normal pulses. Heart sounds: Normal heart sounds. Pulmonary:      Effort: Pulmonary effort is normal.      Breath sounds: Normal breath sounds. Abdominal:      General: Abdomen is flat. Palpations: Abdomen is soft. Musculoskeletal:         General: Normal range of motion. Cervical back: Normal range of motion and neck supple. Comments: Mild discomfort right low back/ gluteal region movement   Skin:     General: Skin is warm and dry. Neurological:      General: No focal deficit present. Mental Status: He is alert. Mental status is at baseline.    Psychiatric:         Mood and Affect: Mood normal.         Behavior: Behavior normal.          Labs:     Results for orders placed or performed during the hospital encounter of 07/12/22   CULTURE, BLOOD    Specimen: Blood   Result Value Ref Range    Special Requests: NO SPECIAL REQUESTS      Culture result: NO GROWTH 6 DAYS     CULTURE, BLOOD    Specimen: Blood   Result Value Ref Range    Special Requests: NO SPECIAL REQUESTS      Culture result: NO GROWTH 6 DAYS     CULTURE, FUNGUS    Specimen: Joint Fluid   Result Value Ref Range    Special Requests: NO SPECIAL REQUESTS      Culture result: NO FUNGUS ISOLATED 32 DAYS     AFB CULTURE + SMEAR W/RFLX ID FROM CULTURE    Specimen: Miscellaneous sample   Result Value Ref Range    Source JOINT FLUID      AFB Specimen processing Concentration     Acid Fast Smear Negative      Acid Fast Culture PENDING    CULTURE, BODY FLUID W GRAM STAIN    Specimen: Joint Fluid   Result Value Ref Range    Special Requests: NO SPECIAL REQUESTS      GRAM STAIN MANY WBCS SEEN      GRAM STAIN NO ORGANISMS SEEN      Culture result: LIGHT STAPHYLOCOCCUS AUREUS (A)      Culture result: Culture performed on Unspun Fluid         Susceptibility    Staphylococcus aureus - IVAN     Clindamycin ($)  Susceptible ug/mL     Daptomycin ($$$$$)  Susceptible ug/mL     Erythromycin ($$$$)  Susceptible ug/mL     Gentamicin ($)  Susceptible ug/mL     Linezolid ($$$$$)  Susceptible ug/mL     Oxacillin  Susceptible ug/mL     Tetracycline  Susceptible ug/mL     Trimeth/Sulfa  Susceptible ug/mL     Vancomycin ($)  Susceptible ug/mL     Ciprofloxacin ($)  Susceptible ug/mL     Levofloxacin ($)  Susceptible ug/mL     Moxifloxacin ($$$$)  Susceptible ug/mL     Doxycycline ($$)  Susceptible ug/mL   CBC WITH AUTOMATED DIFF   Result Value Ref Range    WBC 14.8 (H) 4.6 - 13.2 K/uL    RBC 5.09 4.35 - 5.65 M/uL    HGB 15.5 13.0 - 16.0 g/dL    HCT 45.0 36.0 - 48.0 %    MCV 88.4 78.0 - 100.0 FL    MCH 30.5 24.0 - 34.0 PG    MCHC 34.4 31.0 - 37.0 g/dL    RDW 13.0 11.6 - 14.5 %    PLATELET 746 252 - 278 K/uL    MPV 8.9 (L) 9.2 - 11.8 FL    NRBC 0.0 0  WBC    ABSOLUTE NRBC 0.00 0.00 - 0.01 K/uL    NEUTROPHILS 81 (H) 40 - 73 %    LYMPHOCYTES 13 (L) 21 - 52 %    MONOCYTES 4 3 - 10 %    EOSINOPHILS 1 0 - 5 %    BASOPHILS 1 0 - 2 %    IMMATURE GRANULOCYTES 1 (H) 0.0 - 0.5 %    ABS. NEUTROPHILS 12.0 (H) 1.8 - 8.0 K/UL    ABS. LYMPHOCYTES 1.9 0.9 - 3.6 K/UL    ABS. MONOCYTES 0.6 0.05 - 1.2 K/UL    ABS. EOSINOPHILS 0.1 0.0 - 0.4 K/UL    ABS. BASOPHILS 0.1 0.0 - 0.1 K/UL    ABS. IMM.  GRANS. 0.1 (H) 0.00 - 0.04 K/UL    DF AUTOMATED     METABOLIC PANEL, COMPREHENSIVE   Result Value Ref Range    Sodium 138 136 - 145 mmol/L    Potassium 4.0 3.5 - 5.5 mmol/L    Chloride 101 100 - 111 mmol/L    CO2 30 21 - 32 mmol/L    Anion gap 7 3.0 - 18 mmol/L    Glucose 133 (H) 74 - 99 mg/dL    BUN 22 (H) 7.0 - 18 MG/DL    Creatinine 0.84 0.6 - 1.3 MG/DL BUN/Creatinine ratio 26 (H) 12 - 20      GFR est AA >60 >60 ml/min/1.73m2    GFR est non-AA >60 >60 ml/min/1.73m2    Calcium 10.4 (H) 8.5 - 10.1 MG/DL    Bilirubin, total 0.5 0.2 - 1.0 MG/DL    ALT (SGPT) 62 (H) 16 - 61 U/L    AST (SGOT) 31 10 - 38 U/L    Alk. phosphatase 77 45 - 117 U/L    Protein, total 9.0 (H) 6.4 - 8.2 g/dL    Albumin 3.6 3.4 - 5.0 g/dL    Globulin 5.4 (H) 2.0 - 4.0 g/dL    A-G Ratio 0.7 (L) 0.8 - 1.7     TROPONIN-HIGH SENSITIVITY   Result Value Ref Range    Troponin-High Sensitivity 6 0 - 78 ng/L   C REACTIVE PROTEIN, QT   Result Value Ref Range    C-Reactive protein 4.6 (H) 0 - 0.3 mg/dL   SED RATE (ESR)   Result Value Ref Range    Sed rate, automated 1 0 - 15 mm/hr   METABOLIC PANEL, COMPREHENSIVE   Result Value Ref Range    Sodium 135 (L) 136 - 145 mmol/L    Potassium 4.5 3.5 - 5.5 mmol/L    Chloride 103 100 - 111 mmol/L    CO2 27 21 - 32 mmol/L    Anion gap 5 3.0 - 18 mmol/L    Glucose 188 (H) 74 - 99 mg/dL    BUN 24 (H) 7.0 - 18 MG/DL    Creatinine 0.86 0.6 - 1.3 MG/DL    BUN/Creatinine ratio 28 (H) 12 - 20      GFR est AA >60 >60 ml/min/1.73m2    GFR est non-AA >60 >60 ml/min/1.73m2    Calcium 9.2 8.5 - 10.1 MG/DL    Bilirubin, total 0.6 0.2 - 1.0 MG/DL    ALT (SGPT) 53 16 - 61 U/L    AST (SGOT) 24 10 - 38 U/L    Alk.  phosphatase 67 45 - 117 U/L    Protein, total 8.3 (H) 6.4 - 8.2 g/dL    Albumin 3.0 (L) 3.4 - 5.0 g/dL    Globulin 5.3 (H) 2.0 - 4.0 g/dL    A-G Ratio 0.6 (L) 0.8 - 1.7     CBC WITH AUTOMATED DIFF   Result Value Ref Range    WBC 10.1 4.6 - 13.2 K/uL    RBC 4.33 (L) 4.35 - 5.65 M/uL    HGB 13.1 13.0 - 16.0 g/dL    HCT 38.3 36.0 - 48.0 %    MCV 88.5 78.0 - 100.0 FL    MCH 30.3 24.0 - 34.0 PG    MCHC 34.2 31.0 - 37.0 g/dL    RDW 12.8 11.6 - 14.5 %    PLATELET 851 138 - 875 K/uL    MPV 9.0 (L) 9.2 - 11.8 FL    NRBC 0.0 0  WBC    ABSOLUTE NRBC 0.00 0.00 - 0.01 K/uL    NEUTROPHILS 87 (H) 40 - 73 %    LYMPHOCYTES 11 (L) 21 - 52 %    MONOCYTES 1 (L) 3 - 10 %    EOSINOPHILS 0 0 - 5 %    BASOPHILS 0 0 - 2 %    IMMATURE GRANULOCYTES 0 0.0 - 0.5 %    ABS. NEUTROPHILS 8.8 (H) 1.8 - 8.0 K/UL    ABS. LYMPHOCYTES 1.1 0.9 - 3.6 K/UL    ABS. MONOCYTES 0.1 0.05 - 1.2 K/UL    ABS. EOSINOPHILS 0.0 0.0 - 0.4 K/UL    ABS. BASOPHILS 0.0 0.0 - 0.1 K/UL    ABS. IMM. GRANS. 0.0 0.00 - 0.04 K/UL    DF AUTOMATED     PROTHROMBIN TIME + INR   Result Value Ref Range    Prothrombin time 15.1 11.5 - 15.2 sec    INR 1.1 0.8 - 1.2     METABOLIC PANEL, COMPREHENSIVE   Result Value Ref Range    Sodium 140 136 - 145 mmol/L    Potassium 4.3 3.5 - 5.5 mmol/L    Chloride 105 100 - 111 mmol/L    CO2 32 21 - 32 mmol/L    Anion gap 3 3.0 - 18 mmol/L    Glucose 108 (H) 74 - 99 mg/dL    BUN 23 (H) 7.0 - 18 MG/DL    Creatinine 0.70 0.6 - 1.3 MG/DL    BUN/Creatinine ratio 33 (H) 12 - 20      GFR est AA >60 >60 ml/min/1.73m2    GFR est non-AA >60 >60 ml/min/1.73m2    Calcium 8.8 8.5 - 10.1 MG/DL    Bilirubin, total 0.2 0.2 - 1.0 MG/DL    ALT (SGPT) 43 16 - 61 U/L    AST (SGOT) 20 10 - 38 U/L    Alk. phosphatase 61 45 - 117 U/L    Protein, total 7.7 6.4 - 8.2 g/dL    Albumin 2.8 (L) 3.4 - 5.0 g/dL    Globulin 4.9 (H) 2.0 - 4.0 g/dL    A-G Ratio 0.6 (L) 0.8 - 1.7     CBC WITH AUTOMATED DIFF   Result Value Ref Range    WBC 11.8 4.6 - 13.2 K/uL    RBC 4.13 (L) 4.35 - 5.65 M/uL    HGB 12.5 (L) 13.0 - 16.0 g/dL    HCT 37.2 36.0 - 48.0 %    MCV 90.1 78.0 - 100.0 FL    MCH 30.3 24.0 - 34.0 PG    MCHC 33.6 31.0 - 37.0 g/dL    RDW 13.0 11.6 - 14.5 %    PLATELET 300 468 - 526 K/uL    MPV 9.2 9.2 - 11.8 FL    NRBC 0.0 0  WBC    ABSOLUTE NRBC 0.00 0.00 - 0.01 K/uL    NEUTROPHILS 57 40 - 73 %    LYMPHOCYTES 38 21 - 52 %    MONOCYTES 2 (L) 3 - 10 %    EOSINOPHILS 1 0 - 5 %    BASOPHILS 2 0 - 2 %    IMMATURE GRANULOCYTES 0 %    ABS. NEUTROPHILS 6.8 1.8 - 8.0 K/UL    ABS. LYMPHOCYTES 4.5 (H) 0.9 - 3.6 K/UL    ABS. MONOCYTES 0.2 0.05 - 1.2 K/UL    ABS. EOSINOPHILS 0.1 0.0 - 0.4 K/UL    ABS. BASOPHILS 0.2 (H) 0.0 - 0.1 K/UL    ABS. IMM. GRANS. 0.0 K/UL    DF MANUAL      PLATELET COMMENTS ADEQUATE PLATELETS      RBC COMMENTS NORMOCYTIC, NORMOCHROMIC     METABOLIC PANEL, BASIC   Result Value Ref Range    Sodium 139 136 - 145 mmol/L    Potassium 4.4 3.5 - 5.5 mmol/L    Chloride 104 100 - 111 mmol/L    CO2 32 21 - 32 mmol/L    Anion gap 3 3.0 - 18 mmol/L    Glucose 95 74 - 99 mg/dL    BUN 16 7.0 - 18 MG/DL    Creatinine 0.62 0.6 - 1.3 MG/DL    BUN/Creatinine ratio 26 (H) 12 - 20      GFR est AA >60 >60 ml/min/1.73m2    GFR est non-AA >60 >60 ml/min/1.73m2    Calcium 9.2 8.5 - 10.1 MG/DL   CBC WITH AUTOMATED DIFF   Result Value Ref Range    WBC 8.8 4.6 - 13.2 K/uL    RBC 4.33 (L) 4.35 - 5.65 M/uL    HGB 12.9 (L) 13.0 - 16.0 g/dL    HCT 39.7 36.0 - 48.0 %    MCV 91.7 78.0 - 100.0 FL    MCH 29.8 24.0 - 34.0 PG    MCHC 32.5 31.0 - 37.0 g/dL    RDW 12.9 11.6 - 14.5 %    PLATELET 968 308 - 725 K/uL    MPV 9.3 9.2 - 11.8 FL    NRBC 0.0 0  WBC    ABSOLUTE NRBC 0.00 0.00 - 0.01 K/uL    NEUTROPHILS 50 40 - 73 %    LYMPHOCYTES 40 21 - 52 %    MONOCYTES 7 3 - 10 %    EOSINOPHILS 2 0 - 5 %    BASOPHILS 1 0 - 2 %    IMMATURE GRANULOCYTES 0 0.0 - 0.5 %    ABS. NEUTROPHILS 4.4 1.8 - 8.0 K/UL    ABS. LYMPHOCYTES 3.5 0.9 - 3.6 K/UL    ABS. MONOCYTES 0.6 0.05 - 1.2 K/UL    ABS. EOSINOPHILS 0.2 0.0 - 0.4 K/UL    ABS. BASOPHILS 0.1 0.0 - 0.1 K/UL    ABS. IMM.  GRANS. 0.0 0.00 - 0.04 K/UL    DF AUTOMATED     VANCOMYCIN, RANDOM   Result Value Ref Range    Vancomycin, random 13.0 5.0 - 20.1 UG/ML   METABOLIC PANEL, BASIC   Result Value Ref Range    Sodium 140 136 - 145 mmol/L    Potassium 4.6 3.5 - 5.5 mmol/L    Chloride 104 100 - 111 mmol/L    CO2 32 21 - 32 mmol/L    Anion gap 4 3.0 - 18 mmol/L    Glucose 96 74 - 99 mg/dL    BUN 13 7.0 - 18 MG/DL    Creatinine 0.49 (L) 0.6 - 1.3 MG/DL    BUN/Creatinine ratio 27 (H) 12 - 20      GFR est AA >60 >60 ml/min/1.73m2    GFR est non-AA >60 >60 ml/min/1.73m2    Calcium 9.0 8.5 - 36.3 MG/DL   METABOLIC PANEL, BASIC   Result Value Ref Range    Sodium 138 136 - 145 mmol/L    Potassium 4.1 3.5 - 5.5 mmol/L    Chloride 104 100 - 111 mmol/L    CO2 30 21 - 32 mmol/L    Anion gap 4 3.0 - 18 mmol/L    Glucose 97 74 - 99 mg/dL    BUN 15 7.0 - 18 MG/DL    Creatinine 0.54 (L) 0.6 - 1.3 MG/DL    BUN/Creatinine ratio 28 (H) 12 - 20      GFR est AA >60 >60 ml/min/1.73m2    GFR est non-AA >60 >60 ml/min/1.73m2    Calcium 9.4 8.5 - 10.1 MG/DL   VANCOMYCIN, RANDOM   Result Value Ref Range    Vancomycin, random 18.8 5.0 - 90.5 UG/ML   METABOLIC PANEL, BASIC   Result Value Ref Range    Sodium 138 136 - 145 mmol/L    Potassium 4.5 3.5 - 5.5 mmol/L    Chloride 101 100 - 111 mmol/L    CO2 33 (H) 21 - 32 mmol/L    Anion gap 4 3.0 - 18 mmol/L    Glucose 97 74 - 99 mg/dL    BUN 18 7.0 - 18 MG/DL    Creatinine 0.60 0.6 - 1.3 MG/DL    BUN/Creatinine ratio 30 (H) 12 - 20      GFR est AA >60 >60 ml/min/1.73m2    GFR est non-AA >60 >60 ml/min/1.73m2    Calcium 9.7 8.5 - 10.1 MG/DL   C REACTIVE PROTEIN, QT   Result Value Ref Range    C-Reactive protein 0.4 (H) 0 - 0.3 mg/dL   METABOLIC PANEL, BASIC   Result Value Ref Range    Sodium 138 136 - 145 mmol/L    Potassium 4.1 3.5 - 5.5 mmol/L    Chloride 103 100 - 111 mmol/L    CO2 33 (H) 21 - 32 mmol/L    Anion gap 2 (L) 3.0 - 18 mmol/L    Glucose 90 74 - 99 mg/dL    BUN 17 7.0 - 18 MG/DL    Creatinine 0.68 0.6 - 1.3 MG/DL    BUN/Creatinine ratio 25 (H) 12 - 20      GFR est AA >60 >60 ml/min/1.73m2    GFR est non-AA >60 >60 ml/min/1.73m2    Calcium 9.4 8.5 - 10.1 MG/DL   CBC WITH AUTOMATED DIFF   Result Value Ref Range    WBC 8.0 4.6 - 13.2 K/uL    RBC 3.99 (L) 4.35 - 5.65 M/uL    HGB 12.2 (L) 13.0 - 16.0 g/dL    HCT 36.3 36.0 - 48.0 %    MCV 91.0 78.0 - 100.0 FL    MCH 30.6 24.0 - 34.0 PG    MCHC 33.6 31.0 - 37.0 g/dL    RDW 13.0 11.6 - 14.5 %    PLATELET 408 465 - 767 K/uL    MPV 9.2 9.2 - 11.8 FL    NRBC 0.0 0  WBC    ABSOLUTE NRBC 0.00 0.00 - 0.01 K/uL    NEUTROPHILS 49 40 - 73 %    LYMPHOCYTES 34 21 - 52 % MONOCYTES 11 (H) 3 - 10 %    EOSINOPHILS 4 0 - 5 %    BASOPHILS 1 0 - 2 %    IMMATURE GRANULOCYTES 1 (H) 0.0 - 0.5 %    ABS. NEUTROPHILS 3.9 1.8 - 8.0 K/UL    ABS. LYMPHOCYTES 2.8 0.9 - 3.6 K/UL    ABS. MONOCYTES 0.9 0.05 - 1.2 K/UL    ABS. EOSINOPHILS 0.3 0.0 - 0.4 K/UL    ABS. BASOPHILS 0.1 0.0 - 0.1 K/UL    ABS. IMM. GRANS. 0.1 (H) 0.00 - 0.04 K/UL    DF AUTOMATED     POC LACTIC ACID   Result Value Ref Range    Lactic Acid (POC) 1.18 0.40 - 2.00 mmol/L   EKG, 12 LEAD, INITIAL   Result Value Ref Range    Ventricular Rate 100 BPM    Atrial Rate 100 BPM    P-R Interval 150 ms    QRS Duration 92 ms    Q-T Interval 320 ms    QTC Calculation (Bezet) 412 ms    Calculated P Axis 72 degrees    Calculated R Axis 72 degrees    Calculated T Axis 22 degrees    Diagnosis       Normal sinus rhythm  Nonspecific T wave abnormality  Abnormal ECG  When compared with ECG of 26-FEB-2020 14:09,  Nonspecific T wave abnormality now evident in Lateral leads  Confirmed by Barbara Smalls M.D., [address] [place] [address] ([id number]) on 7/13/2022 10:34:32 PM            Active Problems:     Patient Active Problem List    Diagnosis    History of intravenous drug use in remission    Bipolar mood disorder (HCC)    Hepatitis C    Asthma    Osteomyelitis ([facility]    Septic joint ([facility] [address]    Palpitations    Tobacco abuse       Assessment & Plan:     Diagnoses and all orders for this visit:    1. Septic arthritis, due to unspecified organism, septic arthritis of unspecified location ([facility] [address]  -     CBC WITH AUTOMATED DIFF; Future  -     C REACTIVE PROTEIN, QT; Future  -     REFERRAL TO INFECTIOUS DISEASE  -     cephALEXin (KEFLEX) 500 mg capsule; Take 1 Capsule by mouth four (4) times daily for 28 days.  -     METABOLIC PANEL, COMPREHENSIVE; Future    2. Low hemoglobin  -     CBC WITH AUTOMATED DIFF; Future  -     METABOLIC PANEL, COMPREHENSIVE; Future    3. Encounter to establish care  -     TDAP, BOOSTRIX, (AGE 10 YRS+), IM  -     METABOLIC PANEL, COMPREHENSIVE; Future    4.  Chronic hepatitis C without hepatic coma ([facility] [address]  Assessment & Plan:  ID consult    Orders:  -     REFERRAL TO INFECTIOUS DISEASE  -     METABOLIC PANEL, COMPREHENSIVE; Future    5. IV drug user  Assessment & Plan:   Patient does not do IV drugs any more. Patient had been in [address] of IV heroine use for about 3 monthss. Orders:  -     REFERRAL TO INFECTIOUS DISEASE  -     METABOLIC PANEL, COMPREHENSIVE; Future      Follow-up and Dispositions    Return in about 4 weeks (around 9/19/2022), or if symptoms worsen or fail to improve. Disclaimer: The patient understands our medical plan. Alternatives have been explained and offered. The risks, benefits and significant side effects of all medications have been reviewed. Anticipated time course and progression of condition reviewed. All questions have been addressed. He is encouraged to employ the information provided in the after visit summary, which was reviewed. Where applicable, he is instructed to call the clinic if he has not been notified either by phone or through [address] with the results of his tests or with an appointment plan for any referrals within 1 week(s). No news is not good news; it's no news. The patient  is to call if his condition worsens or fails to improve or if significant side effects are experienced.            Tommie Evangelista MD

## 2022-08-23 ENCOUNTER — TELEPHONE (OUTPATIENT)
Dept: INTERNAL MEDICINE CLINIC | Age: 32
End: 2022-08-23

## 2022-08-23 DIAGNOSIS — B18.2 CHRONIC HEPATITIS C WITHOUT HEPATIC COMA (HCC): Primary | ICD-10-CM

## 2022-08-23 LAB
ALBUMIN SERPL-MCNC: 4.4 G/DL (ref 3.4–5)
ALBUMIN/GLOB SERPL: 1.1 {RATIO} (ref 0.8–1.7)
ALP SERPL-CCNC: 89 U/L (ref 45–117)
ALT SERPL-CCNC: 491 U/L (ref 16–61)
ANION GAP SERPL CALC-SCNC: 7 MMOL/L (ref 3–18)
AST SERPL-CCNC: 243 U/L (ref 10–38)
BILIRUB SERPL-MCNC: 0.5 MG/DL (ref 0.2–1)
BUN SERPL-MCNC: 16 MG/DL (ref 7–18)
BUN/CREAT SERPL: 21 (ref 12–20)
CALCIUM SERPL-MCNC: 9.8 MG/DL (ref 8.5–10.1)
CHLORIDE SERPL-SCNC: 104 MMOL/L (ref 100–111)
CO2 SERPL-SCNC: 28 MMOL/L (ref 21–32)
CREAT SERPL-MCNC: 0.76 MG/DL (ref 0.6–1.3)
GLOBULIN SER CALC-MCNC: 4.1 G/DL (ref 2–4)
GLUCOSE SERPL-MCNC: 85 MG/DL (ref 74–99)
POTASSIUM SERPL-SCNC: 4.9 MMOL/L (ref 3.5–5.5)
PROT SERPL-MCNC: 8.5 G/DL (ref 6.4–8.2)
SODIUM SERPL-SCNC: 139 MMOL/L (ref 136–145)

## 2022-08-23 NOTE — TELEPHONE ENCOUNTER
----- Message from Tasneem Boland MD sent at 8/23/2022  9:40 AM EDT -----  Please call the patient regarding his abnormal result. Increased Liver enzymes.   Sending GI referral.

## 2022-08-24 ENCOUNTER — TELEPHONE (OUTPATIENT)
Dept: INTERNAL MEDICINE CLINIC | Age: 32
End: 2022-08-24

## 2022-08-24 NOTE — TELEPHONE ENCOUNTER
----- Message from Wilnette Collet, MD sent at 8/23/2022  9:40 AM EDT -----  Please call the patient regarding his abnormal result. Increased Liver enzymes.   Sending GI referral.

## 2022-08-26 LAB
ACID FAST STN SPEC: NEGATIVE
MYCOBACTERIUM SPEC QL CULT: NEGATIVE
SPECIMEN PREPARATION: NORMAL
SPECIMEN SOURCE: NORMAL

## 2022-10-03 ENCOUNTER — OFFICE VISIT (OUTPATIENT)
Dept: INTERNAL MEDICINE CLINIC | Age: 32
End: 2022-10-03

## 2022-10-03 VITALS
HEART RATE: 78 BPM | RESPIRATION RATE: 18 BRPM | WEIGHT: 178 LBS | OXYGEN SATURATION: 98 % | TEMPERATURE: 98 F | SYSTOLIC BLOOD PRESSURE: 109 MMHG | HEIGHT: 73 IN | BODY MASS INDEX: 23.59 KG/M2 | DIASTOLIC BLOOD PRESSURE: 63 MMHG

## 2022-10-03 DIAGNOSIS — M00.9 SEPTIC ARTHRITIS, DUE TO UNSPECIFIED ORGANISM, SEPTIC ARTHRITIS OF UNSPECIFIED LOCATION (HCC): ICD-10-CM

## 2022-10-03 DIAGNOSIS — B19.20 HEPATITIS C VIRUS INFECTION WITHOUT HEPATIC COMA, UNSPECIFIED CHRONICITY: ICD-10-CM

## 2022-10-03 DIAGNOSIS — F32.A DEPRESSION, UNSPECIFIED DEPRESSION TYPE: Primary | ICD-10-CM

## 2022-10-03 DIAGNOSIS — F19.91 HISTORY OF INTRAVENOUS DRUG USE IN REMISSION: ICD-10-CM

## 2022-10-03 DIAGNOSIS — Z13.31 POSITIVE DEPRESSION SCREENING: ICD-10-CM

## 2022-10-03 PROCEDURE — 99212 OFFICE O/P EST SF 10 MIN: CPT | Performed by: INTERNAL MEDICINE

## 2022-10-03 NOTE — PROGRESS NOTES
Darcy Cohen. presents today for No chief complaint on file. 1. \"Have you been to the ER, urgent care clinic since your last visit? Hospitalized since your last visit? \" no    2. \"Have you seen or consulted any other health care providers outside of the [address] [place] [address] since your last visit? \" no     3. For patients aged 39-70: Has the patient had a colonoscopy / FIT/ Cologuard? NA - based on age      If the patient is female:    4. For patients aged 41-77: Has the patient had a mammogram within the past 2 years? NA - based on age or sex  See top three    5. For patients aged 21-65: Has the patient had a pap smear?  NA - based on age or sex

## 2022-10-03 NOTE — PROGRESS NOTES
Aashish Nixon. is a 36y.o. year old male who is         Subjective:   Aashish Nixon. was seen today for Follow Up Chronic Condition    Patient's back pain has resolved. Patient has completed course of antibiotics. No hip pain. No fever or chills. Patient works as a  involving a lot of hard physical work. Patient is able to perform it without any problem/pain. Patient has lost his both grandmothers and his father within last 1 year. Patient feels low and depressed. Patient has been having trouble with sleep also. Patient uses melatonin to help him with sleep. Patient does not enjoy his daily activities. Patient wants to have some therapy for the same. Patient does not have any suicidal thoughts. Patient is keeping away from drugs. Patient has a history of bipolar, says that he has used Lamictal and Abilify in the past.    Patient says that he has been hep C positive for last about 7 years. Patient was found to have increased liver enzymes, GI referral was placed.       Past Medical History:   Diagnosis Date    ADHD (attention deficit hyperactivity disorder)     Asthma     Bipolar disorder ([facility] [address]     Facial injury     Hepatitis C     Heroin abuse ([facility] [address]     treatment in Det[facility] 2016    Lyme disease     Osteomyelitis ([facility] [address]        Past Surgical History:   Procedure Laterality Date    HX TYMPANOSTOMY      bilateral TM's as a child       Family History   Problem Relation Age of Onset    Diabetes Mother     Stroke Mother     Emphysema Mother     COPD Mother     Substance Abuse Father     Attention Deficit Hyperactivity Disorder Brother     Diabetes Maternal Grandmother     Stroke Maternal Grandmother     Kidney Disease Maternal Grandfather     Liver Disease Paternal Grandfather         Cirrhosis caused death       Social History     Socioeconomic History    Marital status: SINGLE     Spouse name: Not on file    Number of children: 0    Years of education: 9    Highest education level: GED or equivalent   Occupational History    Occupation: Prep Cook    Tobacco Use    Smoking status: Every Day     Packs/day: 1.00     Years: 10.00     Pack years: 10.00     Types: Cigarettes    Smokeless tobacco: Former   Vaping Use    Vaping Use: Never used   Substance and Sexual Activity    Alcohol use: Yes     Alcohol/week: 3.0 standard drinks     Types: 3 Cans of beer per week     Comment: Occasional    Drug use: Yes     Types: Marijuana    Sexual activity: Not Currently     Partners: Female   Other Topics Concern     Service No    Blood Transfusions No    Caffeine Concern No    Occupational Exposure Yes     Comment: Dust in [address] Yes     Comment: riding horses    Sleep Concern Yes     Comment: \"trouble sleeping\"    Stress Concern Yes    Weight Concern No    Special Diet No    Back Care No    Exercise Yes     Comment: runs    Bike Helmet No    Seat Belt Yes    Self-Exams Yes   Social History Narrative    Not on file     Social Determinants of Health     Financial Resource Strain: Not on file   Food Insecurity: Not on file   Transportation Needs: Not on file   Physical Activity: Not on file   Stress: Not on file   Social Connections: Not on file   Intimate Partner Violence: Not on file   Housing Stability: Not on file       Allergies   Allergen Reactions    Tylenol [Acetaminophen] Other (comments)     \" Have liver problems and should not take\"         Current Outpatient Medications on File Prior to Visit   Medication Sig Dispense Refill    melatonin 5 mg tablet Take 5 mg by mouth nightly. No current facility-administered medications on file prior to visit. Review of Systems   Constitutional: Negative. HENT: Negative. Eyes: Negative. Respiratory: Negative. Cardiovascular: Negative. Gastrointestinal: Negative. Genitourinary: Negative. Musculoskeletal: Negative. Skin: Negative. Neurological: Negative. Endo/Heme/Allergies: Negative. Psychiatric/Behavioral:  Positive for depression. Negative for suicidal ideas. The patient has insomnia. Objective:     Vitals:    10/03/22 0832   BP: 109/63   Pulse: 78   Resp: 18   Temp: 98 °F (36.7 °C)   SpO2: 98%   Weight: 178 lb (80.7 kg)   Height: 6' 1\" (1.854 m)      Physical Exam  Constitutional:       Appearance: Normal appearance. HENT:      Head: Normocephalic and atraumatic. Nose: Nose normal.      Mouth/Throat:      Mouth: Mucous membranes are moist.   Eyes:      Pupils: Pupils are equal, round, and reactive to light. Cardiovascular:      Rate and Rhythm: Normal rate and regular rhythm. Pulses: Normal pulses. Heart sounds: Normal heart sounds. Pulmonary:      Effort: Pulmonary effort is normal.      Breath sounds: Normal breath sounds. Abdominal:      General: Abdomen is flat. Palpations: Abdomen is soft. Musculoskeletal:         General: Normal range of motion. Cervical back: Normal range of motion and neck supple. Skin:     General: Skin is warm and dry. Neurological:      General: No focal deficit present. Mental Status: He is alert. Mental status is at baseline. Psychiatric:         Mood and Affect: Mood normal.         Behavior: Behavior normal.          Labs:     Results for orders placed or performed during the hospital encounter of 08/22/22   CBC WITH AUTOMATED DIFF   Result Value Ref Range    WBC 6.8 4.6 - 13.2 K/uL    RBC 4.99 4.35 - 5.65 M/uL    HGB 15.2 13.0 - 16.0 g/dL    HCT 46.9 36.0 - 48.0 %    MCV 94.0 78.0 - 100.0 FL    MCH 30.5 24.0 - 34.0 PG    MCHC 32.4 31.0 - 37.0 g/dL    RDW 14.1 11.6 - 14.5 %    PLATELET 955 079 - 482 K/uL    MPV 9.5 9.2 - 11.8 FL    NRBC 0.0 0  WBC    ABSOLUTE NRBC 0.00 0.00 - 0.01 K/uL    NEUTROPHILS 52 40 - 73 %    LYMPHOCYTES 32 21 - 52 %    MONOCYTES 10 3 - 10 %    EOSINOPHILS 5 0 - 5 %    BASOPHILS 1 0 - 2 %    IMMATURE GRANULOCYTES 1 (H) 0.0 - 0.5 %    ABS.  NEUTROPHILS 3.6 1.8 - 8.0 K/UL ABS. LYMPHOCYTES 2.1 0.9 - 3.6 K/UL    ABS. MONOCYTES 0.7 0.05 - 1.2 K/UL    ABS. EOSINOPHILS 0.3 0.0 - 0.4 K/UL    ABS. BASOPHILS 0.1 0.0 - 0.1 K/UL    ABS. IMM. GRANS. 0.1 (H) 0.00 - 0.04 K/UL    DF AUTOMATED     C REACTIVE PROTEIN, QT   Result Value Ref Range    C-Reactive protein 0.5 (H) 0 - 0.3 mg/dL   METABOLIC PANEL, COMPREHENSIVE   Result Value Ref Range    Sodium 139 136 - 145 mmol/L    Potassium 4.9 3.5 - 5.5 mmol/L    Chloride 104 100 - 111 mmol/L    CO2 28 21 - 32 mmol/L    Anion gap 7 3.0 - 18 mmol/L    Glucose 85 74 - 99 mg/dL    BUN 16 7.0 - 18 MG/DL    Creatinine 0.76 0.6 - 1.3 MG/DL    BUN/Creatinine ratio 21 (H) 12 - 20      GFR est AA >60 >60 ml/min/1.73m2    GFR est non-AA >60 >60 ml/min/1.73m2    Calcium 9.8 8.5 - 10.1 MG/DL    Bilirubin, total 0.5 0.2 - 1.0 MG/DL    ALT (SGPT) 491 (H) 16 - 61 U/L    AST (SGOT) 243 (H) 10 - 38 U/L    Alk. phosphatase 89 45 - 117 U/L    Protein, total 8.5 (H) 6.4 - 8.2 g/dL    Albumin 4.4 3.4 - 5.0 g/dL    Globulin 4.1 (H) 2.0 - 4.0 g/dL    A-G Ratio 1.1 0.8 - 1.7            Active Problems:     Patient Active Problem List    Diagnosis    Low hemoglobin    Encounter to establish care    History of intravenous drug use in remission    Bipolar mood disorder (HCC)    Hepatitis C    Asthma    Osteomyelitis ([facility] [address]    IV drug user    Septic joint ([facility] [address]    Palpitations    Tobacco abuse       Assessment & Plan:     Diagnoses and all orders for this visit:    1. Depression, unspecified depression type  Assessment & Plan:  History of bipolar. Uses melatonin at bedtime, helps with sleep. Psych consult    Orders:  -     REFERRAL TO PSYCHIATRY    2. Positive depression screening  -     REFERRAL TO PSYCHIATRY    3. Hepatitis C virus infection without hepatic coma, unspecified chronicity  Assessment & Plan:  And liver enzymes.   Hep C positive for about last 7 years  GI Referral    Orders:  -     REFERRAL TO GASTROENTEROLOGY    4. History of intravenous drug use in remission    5. Septic arthritis, due to unspecified organism, septic arthritis of unspecified location [facility])  Assessment & Plan:   Completed course of antibiotics. Clinically stable        Follow-up and Dispositions    Return in about 4 weeks (around 10/31/2022), or if symptoms worsen or fail to improve, for Follow-up on chronic conditions. Disclaimer: The patient understands our medical plan. Alternatives have been explained and offered. The risks, benefits and significant side effects of all medications have been reviewed. Anticipated time course and progression of condition reviewed. All questions have been addressed. He is encouraged to employ the information provided in the after visit summary, which was reviewed. Where applicable, he is instructed to call the clinic if he has not been notified either by phone or through [address] with the results of his tests or with an appointment plan for any referrals within 1 week(s). No news is not good news; it's no news. The patient  is to call if his condition worsens or fails to improve or if significant side effects are experienced.            Ramakrishna Hernandez MD

## 2023-12-08 ENCOUNTER — HOSPITAL ENCOUNTER (EMERGENCY)
Facility: HOSPITAL | Age: 33
Discharge: ANOTHER HOSPITAL | End: 2023-12-09

## 2023-12-08 DIAGNOSIS — F11.10 OPIATE ABUSE, CONTINUOUS (HCC): Primary | ICD-10-CM

## 2023-12-08 LAB
ALBUMIN SERPL-MCNC: 4 G/DL (ref 3.4–5)
ALBUMIN/GLOB SERPL: 0.8 (ref 0.8–1.7)
ALP SERPL-CCNC: 72 U/L (ref 45–117)
ALT SERPL-CCNC: 87 U/L (ref 16–61)
AMPHET UR QL SCN: NEGATIVE
ANION GAP SERPL CALC-SCNC: 4 MMOL/L (ref 3–18)
APPEARANCE UR: NORMAL
AST SERPL-CCNC: 50 U/L (ref 10–38)
BARBITURATES UR QL SCN: NEGATIVE
BASOPHILS # BLD: 0.1 K/UL (ref 0–0.1)
BASOPHILS NFR BLD: 1 % (ref 0–2)
BENZODIAZ UR QL: NEGATIVE
BILIRUB SERPL-MCNC: 0.4 MG/DL (ref 0.2–1)
BILIRUB UR QL: NEGATIVE
BUN SERPL-MCNC: 14 MG/DL (ref 7–18)
BUN/CREAT SERPL: 15 (ref 12–20)
CALCIUM SERPL-MCNC: 9.7 MG/DL (ref 8.5–10.1)
CANNABINOIDS UR QL SCN: POSITIVE
CHLORIDE SERPL-SCNC: 103 MMOL/L (ref 100–111)
CO2 SERPL-SCNC: 32 MMOL/L (ref 21–32)
COCAINE UR QL SCN: POSITIVE
COLOR UR: YELLOW
CREAT SERPL-MCNC: 0.95 MG/DL (ref 0.6–1.3)
DIFFERENTIAL METHOD BLD: ABNORMAL
EOSINOPHIL # BLD: 0.3 K/UL (ref 0–0.4)
EOSINOPHIL NFR BLD: 2 % (ref 0–5)
ERYTHROCYTE [DISTWIDTH] IN BLOOD BY AUTOMATED COUNT: 13.1 % (ref 11.6–14.5)
ETHANOL SERPL-MCNC: <3 MG/DL (ref 0–3)
FLUAV RNA SPEC QL NAA+PROBE: NOT DETECTED
FLUBV RNA SPEC QL NAA+PROBE: NOT DETECTED
GLOBULIN SER CALC-MCNC: 4.9 G/DL (ref 2–4)
GLUCOSE SERPL-MCNC: 129 MG/DL (ref 74–99)
GLUCOSE UR STRIP.AUTO-MCNC: NEGATIVE MG/DL
HCT VFR BLD AUTO: 43.4 % (ref 36–48)
HGB BLD-MCNC: 14.5 G/DL (ref 13–16)
HGB UR QL STRIP: NEGATIVE
IMM GRANULOCYTES # BLD AUTO: 0.1 K/UL (ref 0–0.04)
IMM GRANULOCYTES NFR BLD AUTO: 1 % (ref 0–0.5)
KETONES UR QL STRIP.AUTO: NEGATIVE MG/DL
LEUKOCYTE ESTERASE UR QL STRIP.AUTO: NEGATIVE
LYMPHOCYTES # BLD: 2.7 K/UL (ref 0.9–3.6)
LYMPHOCYTES NFR BLD: 23 % (ref 21–52)
Lab: ABNORMAL
MCH RBC QN AUTO: 30.6 PG (ref 24–34)
MCHC RBC AUTO-ENTMCNC: 33.4 G/DL (ref 31–37)
MCV RBC AUTO: 91.6 FL (ref 78–100)
METHADONE UR QL: NEGATIVE
MONOCYTES # BLD: 0.5 K/UL (ref 0.05–1.2)
MONOCYTES NFR BLD: 5 % (ref 3–10)
NEUTS SEG # BLD: 7.8 K/UL (ref 1.8–8)
NEUTS SEG NFR BLD: 69 % (ref 40–73)
NITRITE UR QL STRIP.AUTO: NEGATIVE
NRBC # BLD: 0 K/UL (ref 0–0.01)
NRBC BLD-RTO: 0 PER 100 WBC
OPIATES UR QL: POSITIVE
PCP UR QL: NEGATIVE
PH UR STRIP: 8 (ref 5–8)
PLATELET # BLD AUTO: 268 K/UL (ref 135–420)
PMV BLD AUTO: 9.3 FL (ref 9.2–11.8)
POTASSIUM SERPL-SCNC: 3.8 MMOL/L (ref 3.5–5.5)
PROT SERPL-MCNC: 8.9 G/DL (ref 6.4–8.2)
PROT UR STRIP-MCNC: NEGATIVE MG/DL
RBC # BLD AUTO: 4.74 M/UL (ref 4.35–5.65)
SARS-COV-2 RNA RESP QL NAA+PROBE: NOT DETECTED
SODIUM SERPL-SCNC: 139 MMOL/L (ref 136–145)
SP GR UR REFRACTOMETRY: 1.02 (ref 1–1.03)
UROBILINOGEN UR QL STRIP.AUTO: 0.2 EU/DL (ref 0.2–1)
WBC # BLD AUTO: 11.4 K/UL (ref 4.6–13.2)

## 2023-12-08 PROCEDURE — 99285 EMERGENCY DEPT VISIT HI MDM: CPT

## 2023-12-08 PROCEDURE — 85025 COMPLETE CBC W/AUTO DIFF WBC: CPT

## 2023-12-08 PROCEDURE — 87636 SARSCOV2 & INF A&B AMP PRB: CPT

## 2023-12-08 PROCEDURE — 82077 ASSAY SPEC XCP UR&BREATH IA: CPT

## 2023-12-08 PROCEDURE — 80053 COMPREHEN METABOLIC PANEL: CPT

## 2023-12-08 PROCEDURE — 80307 DRUG TEST PRSMV CHEM ANLYZR: CPT

## 2023-12-08 PROCEDURE — 81003 URINALYSIS AUTO W/O SCOPE: CPT

## 2023-12-08 RX ORDER — DIPHENHYDRAMINE HCL 25 MG
25 CAPSULE ORAL
Status: COMPLETED | OUTPATIENT
Start: 2023-12-08 | End: 2023-12-09

## 2023-12-08 ASSESSMENT — ENCOUNTER SYMPTOMS
VOMITING: 0
BACK PAIN: 0
SHORTNESS OF BREATH: 0
SORE THROAT: 0
RHINORRHEA: 0
STRIDOR: 0
EYE DISCHARGE: 0
NAUSEA: 0
COUGH: 0
EYE REDNESS: 0
DIARRHEA: 0
CONSTIPATION: 0
ABDOMINAL PAIN: 0
WHEEZING: 0

## 2023-12-08 ASSESSMENT — PAIN - FUNCTIONAL ASSESSMENT: PAIN_FUNCTIONAL_ASSESSMENT: NONE - DENIES PAIN

## 2023-12-08 NOTE — ED PROVIDER NOTES
EMERGENCY DEPARTMENT HISTORY AND PHYSICAL EXAM    Date: 12/8/2023  Patient Name: Luis Goodwin. History of Presenting Illness     Chief Complaint   Patient presents with    Addiction Problem     Heroin     Suicidal     States thoughts of overdose         History Provided By: patient     Chief Complaint: substance abuse   Duration: months  Timing:  acute on chronic    Location: n/a  Quality: n/a  Severity: moderate   Modifying Factors: none   Associated Symptoms: depression      Additional History (Context): Luis Jauregui is a 34 y.o. male with PMH bipolar disorder asthma ADHD hepatitis C Lyme disease and heroin abuse who presents to the ED requesting to speak to somebody about heroin detox. Patient states he last used around 5 AM this morning. He does report some depression but denies suicidal thoughts at this time. Patient states he has had thoughts of overdosing in the past.  Denies any issues with alcohol abuse. No other complaints are reported.     PCP: None, None    Current Facility-Administered Medications   Medication Dose Route Frequency Provider Last Rate Last Admin    diphenhydrAMINE (BENADRYL) capsule 25 mg  25 mg Oral NOW Leonel, April LISSA, PASabineC         Current Outpatient Medications   Medication Sig Dispense Refill    melatonin 5 MG TABS tablet Take 5 mg by mouth         Past History     Past Medical History:  Past Medical History:   Diagnosis Date    ADHD (attention deficit hyperactivity disorder)     Asthma     Bipolar disorder ([address])     Facial injury     Hepatitis C     Heroin abuse ([address])     treatment in Detox 2016    Lyme disease     Osteomyelitis ([address])        Past Surgical History:  Past Surgical History:   Procedure Laterality Date    CT ASP ABS HEMATOMA BULLA CYST  7/14/2022    CT ASP ABS HEMATOMA BULLA CYST 7/14/2022 [facility] RAD CT    TYMPANOSTOMY TUBE PLACEMENT      bilateral TM's as a child       Family History:  Family History   Problem Relation Age of Onset    Substance Abuse Specific Gravity, UA 1.020 1.005 - 1.030      pH, Urine 8.0 5.0 - 8.0      Protein, UA Negative NEG mg/dL    Glucose, UA Negative NEG mg/dL    Ketones, Urine Negative NEG mg/dL    Bilirubin Urine Negative NEG      Blood, Urine Negative NEG      Urobilinogen, Urine 0.2 0.2 - 1.0 EU/dL    Nitrite, Urine Negative NEG      Leukocyte Esterase, Urine Negative NEG         Radiologic Studies -   No orders to display     [unfilled]  [unfilled]      Medical Decision Making   I am the first provider for this patient. I reviewed the vital signs, available nursing notes, past medical history, past surgical history, family history and social history. Vital Signs-Reviewed the patient's vital signs. Records Reviewed: Luz Castaneda PA-C     Procedures:  Procedures    Provider Notes (Medical Decision Making): Impression:  opiate abuse     Labs ordered for medical clearance, BSMART consult placed. Glucose 129 alcohol negative CBC unremarkable UDS positive for opiates cocaine and THC. COVID and influenza test negative urinalysis is unremarkable. BSMART consult placed. Pt medically cleared. Luz Castaneda PA-C    12:18 AM patient is pending crisis evaluation. The crisis nurse states that she will be down in about 10 minutes to evaluate the patient. Luz Castaneda PA-C     2:17 AM patient was seen and evaluated by the crisis worker. Pending likely bed search. Patient will be turned over to night ED attending Dr. Pauline Toney who will assume care following the completion of my shift. Luz Castaneda PA-C     Dictation disclaimer:  Please note that this dictation was completed with [employer], the computer voice recognition software. Quite often unanticipated grammatical, syntax, homophones, and other interpretive errors are inadvertently transcribed by the computer software. Please disregard these errors. Please excuse any errors that have escaped final proofreading.       MED RECONCILIATION:  Current Facility-Administered Medications

## 2023-12-08 NOTE — ED TRIAGE NOTES
Patient states history of substance abuse,states IV drug use, last used heroin today. Patient wanting to get help, denies all other use denies ETOH . Patient states SI thoughts with plan to overdose, denies HI, denies visual or auditory hallucinations. Patient calm and cooperative at this time.

## 2023-12-09 VITALS
RESPIRATION RATE: 18 BRPM | HEART RATE: 66 BPM | DIASTOLIC BLOOD PRESSURE: 79 MMHG | WEIGHT: 160 LBS | HEIGHT: 72 IN | OXYGEN SATURATION: 98 % | BODY MASS INDEX: 21.67 KG/M2 | TEMPERATURE: 97 F | SYSTOLIC BLOOD PRESSURE: 136 MMHG

## 2023-12-09 PROCEDURE — 6370000000 HC RX 637 (ALT 250 FOR IP): Performed by: EMERGENCY MEDICINE

## 2023-12-09 PROCEDURE — 6370000000 HC RX 637 (ALT 250 FOR IP): Performed by: PHYSICIAN ASSISTANT

## 2023-12-09 RX ORDER — LORAZEPAM 1 MG/1
1 TABLET ORAL EVERY 4 HOURS PRN
Status: DISCONTINUED | OUTPATIENT
Start: 2023-12-09 | End: 2023-12-09 | Stop reason: HOSPADM

## 2023-12-09 RX ORDER — PROMETHAZINE HYDROCHLORIDE 25 MG/1
25 TABLET ORAL
Status: COMPLETED | OUTPATIENT
Start: 2023-12-09 | End: 2023-12-09

## 2023-12-09 RX ORDER — ONDANSETRON 4 MG/1
4 TABLET, ORALLY DISINTEGRATING ORAL
Status: COMPLETED | OUTPATIENT
Start: 2023-12-09 | End: 2023-12-09

## 2023-12-09 RX ORDER — CLONIDINE HYDROCHLORIDE 0.1 MG/1
0.2 TABLET ORAL
Status: COMPLETED | OUTPATIENT
Start: 2023-12-09 | End: 2023-12-09

## 2023-12-09 RX ADMIN — PROMETHAZINE HYDROCHLORIDE 25 MG: 25 TABLET ORAL at 08:38

## 2023-12-09 RX ADMIN — LORAZEPAM 1 MG: 1 TABLET ORAL at 05:07

## 2023-12-09 RX ADMIN — CLONIDINE HYDROCHLORIDE 0.2 MG: 0.1 TABLET ORAL at 08:38

## 2023-12-09 RX ADMIN — ONDANSETRON 4 MG: 4 TABLET, ORALLY DISINTEGRATING ORAL at 05:59

## 2023-12-09 RX ADMIN — DIPHENHYDRAMINE HYDROCHLORIDE 25 MG: 25 CAPSULE ORAL at 02:01

## 2023-12-09 NOTE — BSMART NOTE
Crisis Note:  Spoke with Nurse Gurvinder, [facility] and she advised they will take someone that  is detoxing but will not take them if they are prescribed Suboxone. . She instructed to send over clinical packet for review. She will need to complete a phone interview with  Patient as well. They do not do admissions over night, so if accepted it will not occur until later in the morning. Corina Chandler, NITHYA, CSAC, CADC  [facility] Counselor

## 2023-12-09 NOTE — BSMART NOTE
Crisis Note:  Clinical packet faxed to [facility] at [phone]. Corina Chandler, NITHYA, CSAC, CADC  [facility]

## 2023-12-09 NOTE — ED NOTES
Report called.  Report given to [facility] JUDITH CAT, [address]  12/09/23 1000

## 2023-12-09 NOTE — ED NOTES
[facility] in [place], Jennifer Paul, called and asked if patient wanting Suboxone or Methadone for detox because not offered at facility. Patient who is awake in bed, reports \"no, I don't want anything, I want to get everything out of my system\". Reported patient's words, [facility] reports will call back with a dispo decision.       Crystal Blood [place]  12/09/23 0253

## 2023-12-09 NOTE — ED NOTES
TRANSFER - OUT REPORT:    Verbal report given to transport on Rosangela Chisholm.  being transferred to [address] [place] [address] for routine progression of patient care       Report consisted of patient's Situation, Background, Assessment and   Recommendations(SBAR). Information from the following report(s) Nurse Handoff Report, Index, ED Encounter Summary, ED SBAR, Adult Overview, and Intake/Output was reviewed with the receiving nurse. [place] Fall Assessment:    Presents to emergency department  because of falls (Syncope, seizure, or loss of consciousness): No  Age > 70: No  Altered Mental Status, Intoxication with alcohol or substance confusion (Disorientation, impaired judgment, poor safety awaremess, or inability to follow instructions): No  Impaired Mobility: Ambulates or transfers with assistive devices or assistance; Unable to ambulate or transer.: No  Nursing Judgement: No          Lines:       Opportunity for questions and clarification was provided.                  Ed Alvarez RN  12/09/23 5469

## 2023-12-09 NOTE — ED NOTES
Emesis x1. Patient Aox4, tolerated PO meds w/ small sip of water.       Jimbo Mccullough RN  12/09/23 6264

## 2023-12-09 NOTE — BSMART NOTE
Crisis note:    Called the [facility] ([phone]) to follow up on referral sent for review earlier. No answer after several rings, will attempt follow up later today.

## 2023-12-09 NOTE — BSMART NOTE
Crisis note:    Patient has been accepted to the [facility]  Accepting is Dr. Higinio Rodriguez set up for 1200.

## 2023-12-09 NOTE — ED NOTES
sandy ISSA, call back and reports patient accepted at [address] [place] [address] facility.       Latanya Davis, [address]  12/09/23 5216

## 2024-12-31 NOTE — ROUTINE PROCESS
Bedside shift change report given to SINDI Peguero (oncoming nurse) by Manuel Lowery RN (offgoing nurse). Report included the following information SBAR, Kardex, Intake/Output and Recent Results. Alert-The patient is alert, awake and responds to voice. The patient is oriented to time, place, and person. The triage nurse is able to obtain subjective information.

## 2025-06-30 ENCOUNTER — APPOINTMENT (OUTPATIENT)
Age: 35
End: 2025-06-30

## 2025-06-30 ENCOUNTER — HOSPITAL ENCOUNTER (EMERGENCY)
Age: 35
Discharge: HOME | End: 2025-06-30
Attending: STUDENT IN AN ORGANIZED HEALTH CARE EDUCATION/TRAINING PROGRAM

## 2025-06-30 VITALS
RESPIRATION RATE: 20 BRPM | OXYGEN SATURATION: 100 % | BODY MASS INDEX: 22.35 KG/M2 | DIASTOLIC BLOOD PRESSURE: 88 MMHG | SYSTOLIC BLOOD PRESSURE: 143 MMHG | HEART RATE: 108 BPM | HEIGHT: 72 IN | WEIGHT: 165 LBS | TEMPERATURE: 98.4 F

## 2025-06-30 DIAGNOSIS — R51.9 ACUTE NONINTRACTABLE HEADACHE, UNSPECIFIED HEADACHE TYPE: Primary | ICD-10-CM

## 2025-06-30 PROCEDURE — 99284 EMERGENCY DEPT VISIT MOD MDM: CPT

## 2025-06-30 PROCEDURE — 70450 CT HEAD/BRAIN W/O DYE: CPT

## 2025-06-30 ASSESSMENT — LIFESTYLE VARIABLES
HOW OFTEN DO YOU HAVE A DRINK CONTAINING ALCOHOL: NEVER
HOW MANY STANDARD DRINKS CONTAINING ALCOHOL DO YOU HAVE ON A TYPICAL DAY: PATIENT DOES NOT DRINK

## 2025-06-30 ASSESSMENT — PAIN - FUNCTIONAL ASSESSMENT: PAIN_FUNCTIONAL_ASSESSMENT: 0-10

## 2025-06-30 ASSESSMENT — PAIN DESCRIPTION - LOCATION: LOCATION: HEAD

## 2025-06-30 ASSESSMENT — PAIN SCALES - GENERAL: PAINLEVEL_OUTOF10: 0

## 2025-06-30 NOTE — ED PROVIDER NOTES
EMERGENCY DEPARTMENT HISTORY AND PHYSICAL EXAM      Date: (Not on file)  Patient Name: Geremias Pascual Jr.    History of Presenting Illness     Chief Complaint   Patient presents with    Headache       History (Context): Geremias Pascual Jr. is a 34 y.o. male  presents to the ED today with chief complaint of a headache.  He said he had a headache for about a month intermittently lasting about 5 5 to 20 minutes when it happens.  Says its primarily when he is try to get relaxer for [place] in the morning.  Headache seems to be at his right occiput and then has like a bright spot in his vision in the medial aspect of his left and right field of vision.  Denies injury numbness weakness changes in speech.  Denies trauma.  Is currently asymptomatic.  He says his blood aunt has a brain tumor and he would like to be evaluated.      PCP: None, None    No current facility-administered medications for this encounter.     Current Outpatient Medications   Medication Sig Dispense Refill    melatonin 5 MG TABS tablet Take 5 mg by mouth         Past History     Past Medical History:   Past Medical History:   Diagnosis Date    ADHD (attention deficit hyperactivity disorder)     Asthma     Bipolar disorder (HCC)     Facial injury     Hepatitis C     Heroin abuse (HCC)     treatment in Detox 2016    Lyme disease     Osteomyelitis (HCC)        Past Surgical History:  Past Surgical History:   Procedure Laterality Date    CT ASP ABS HEMATOMA BULLA CYST  7/14/2022    CT ASP ABS HEMATOMA BULLA CYST 7/14/2022 [facility] RAD CT    TYMPANOSTOMY TUBE PLACEMENT      bilateral TM's as a child       Family History:  Family History   Problem Relation Age of Onset    Substance Abuse Father     ADHD Brother     Diabetes Maternal Grandmother     Stroke Maternal Grandmother     COPD Mother     Liver Disease Paternal Grandfather         Cirrhosis caused death    Emphysema Mother     Diabetes Mother     Stroke Mother     Kidney Disease Maternal Grandfather

## 2025-06-30 NOTE — ED TRIAGE NOTES
Patient presents to ER with c/o intermittent headache over the past few weeks.  Patient states that he does work outside in the heat.